# Patient Record
Sex: FEMALE | Race: BLACK OR AFRICAN AMERICAN | Employment: FULL TIME | URBAN - METROPOLITAN AREA
[De-identification: names, ages, dates, MRNs, and addresses within clinical notes are randomized per-mention and may not be internally consistent; named-entity substitution may affect disease eponyms.]

---

## 2022-12-25 ENCOUNTER — HOSPITAL ENCOUNTER (EMERGENCY)
Facility: HOSPITAL | Age: 22
Discharge: HOME | End: 2022-12-25
Attending: EMERGENCY MEDICINE
Payer: MEDICAID

## 2022-12-25 VITALS
HEIGHT: 62 IN | SYSTOLIC BLOOD PRESSURE: 105 MMHG | WEIGHT: 180 LBS | TEMPERATURE: 98 F | BODY MASS INDEX: 33.13 KG/M2 | DIASTOLIC BLOOD PRESSURE: 74 MMHG | HEART RATE: 96 BPM | RESPIRATION RATE: 19 BRPM | OXYGEN SATURATION: 96 %

## 2022-12-25 DIAGNOSIS — Z20.822 ENCOUNTER FOR LABORATORY TESTING FOR COVID-19 VIRUS: Primary | ICD-10-CM

## 2022-12-25 DIAGNOSIS — R09.81 SINUS CONGESTION: ICD-10-CM

## 2022-12-25 LAB
FLUAV + FLUBV RNA SPEC NAA+PROBE: NEGATIVE
FLUAV + FLUBV RNA SPEC NAA+PROBE: NEGATIVE
RSV RNA SPEC NAA+PROBE: NEGATIVE
SARS-COV-2 RNA RESP QL NAA+PROBE: DETECTED

## 2022-12-25 PROCEDURE — 99283 EMERGENCY DEPT VISIT LOW MDM: CPT

## 2022-12-25 PROCEDURE — 0241U SARS-COV-2/FLU A AND B/RSV BY PCR (GENEXPERT): CPT | Performed by: EMERGENCY MEDICINE

## 2022-12-25 RX ORDER — PSEUDOEPHEDRINE HCL 120 MG/1
120 TABLET, FILM COATED, EXTENDED RELEASE ORAL EVERY 12 HOURS PRN
Qty: 10 TABLET | Refills: 0 | Status: SHIPPED | OUTPATIENT
Start: 2022-12-25 | End: 2023-01-24

## 2023-01-08 ENCOUNTER — APPOINTMENT (OUTPATIENT)
Dept: CT IMAGING | Facility: HOSPITAL | Age: 23
End: 2023-01-08
Attending: EMERGENCY MEDICINE
Payer: MEDICAID

## 2023-01-08 ENCOUNTER — HOSPITAL ENCOUNTER (EMERGENCY)
Facility: HOSPITAL | Age: 23
Discharge: HOME | End: 2023-01-08
Attending: EMERGENCY MEDICINE
Payer: MEDICAID

## 2023-01-08 VITALS
SYSTOLIC BLOOD PRESSURE: 106 MMHG | HEIGHT: 62 IN | HEART RATE: 57 BPM | WEIGHT: 190 LBS | OXYGEN SATURATION: 99 % | RESPIRATION RATE: 20 BRPM | BODY MASS INDEX: 34.96 KG/M2 | DIASTOLIC BLOOD PRESSURE: 59 MMHG | TEMPERATURE: 98 F

## 2023-01-08 DIAGNOSIS — R10.9 ABDOMINAL PAIN, ACUTE: Primary | ICD-10-CM

## 2023-01-08 LAB
ALBUMIN SERPL-MCNC: 3.7 G/DL (ref 3.4–5)
ALBUMIN/GLOB SERPL: 0.9 {RATIO} (ref 1–2)
ALP LIVER SERPL-CCNC: 93 U/L
ALT SERPL-CCNC: 9 U/L
ANION GAP SERPL CALC-SCNC: 4 MMOL/L (ref 0–18)
AST SERPL-CCNC: 8 U/L (ref 15–37)
B-HCG UR QL: NEGATIVE
BASOPHILS # BLD AUTO: 0.02 X10(3) UL (ref 0–0.2)
BASOPHILS NFR BLD AUTO: 0.4 %
BILIRUB SERPL-MCNC: 1 MG/DL (ref 0.1–2)
BILIRUB UR QL: NEGATIVE
BUN BLD-MCNC: 6 MG/DL (ref 7–18)
BUN/CREAT SERPL: 10.5 (ref 10–20)
CALCIUM BLD-MCNC: 9 MG/DL (ref 8.5–10.1)
CHLORIDE SERPL-SCNC: 107 MMOL/L (ref 98–112)
CLARITY UR: CLEAR
CO2 SERPL-SCNC: 29 MMOL/L (ref 21–32)
COLOR UR: YELLOW
CREAT BLD-MCNC: 0.57 MG/DL
DEPRECATED RDW RBC AUTO: 43.8 FL (ref 35.1–46.3)
EOSINOPHIL # BLD AUTO: 0.05 X10(3) UL (ref 0–0.7)
EOSINOPHIL NFR BLD AUTO: 1 %
ERYTHROCYTE [DISTWIDTH] IN BLOOD BY AUTOMATED COUNT: 14.5 % (ref 11–15)
GFR SERPLBLD BASED ON 1.73 SQ M-ARVRAT: 132 ML/MIN/1.73M2 (ref 60–?)
GLOBULIN PLAS-MCNC: 3.9 G/DL (ref 2.8–4.4)
GLUCOSE BLD-MCNC: 95 MG/DL (ref 70–99)
GLUCOSE UR-MCNC: NEGATIVE MG/DL
HCT VFR BLD AUTO: 34.8 %
HGB BLD-MCNC: 11.2 G/DL
HGB UR QL STRIP.AUTO: NEGATIVE
IMM GRANULOCYTES # BLD AUTO: 0.01 X10(3) UL (ref 0–1)
IMM GRANULOCYTES NFR BLD: 0.2 %
KETONES UR-MCNC: NEGATIVE MG/DL
LYMPHOCYTES # BLD AUTO: 2.23 X10(3) UL (ref 1–4)
LYMPHOCYTES NFR BLD AUTO: 42.4 %
MCH RBC QN AUTO: 26.8 PG (ref 26–34)
MCHC RBC AUTO-ENTMCNC: 32.2 G/DL (ref 31–37)
MCV RBC AUTO: 83.3 FL
MONOCYTES # BLD AUTO: 0.28 X10(3) UL (ref 0.1–1)
MONOCYTES NFR BLD AUTO: 5.3 %
NEUTROPHILS # BLD AUTO: 2.67 X10 (3) UL (ref 1.5–7.7)
NEUTROPHILS # BLD AUTO: 2.67 X10(3) UL (ref 1.5–7.7)
NEUTROPHILS NFR BLD AUTO: 50.7 %
NITRITE UR QL STRIP.AUTO: NEGATIVE
OSMOLALITY SERPL CALC.SUM OF ELEC: 287 MOSM/KG (ref 275–295)
PH UR: 7 [PH] (ref 5–8)
PLATELET # BLD AUTO: 290 10(3)UL (ref 150–450)
POTASSIUM SERPL-SCNC: 3.7 MMOL/L (ref 3.5–5.1)
PROT SERPL-MCNC: 7.6 G/DL (ref 6.4–8.2)
PROT UR-MCNC: NEGATIVE MG/DL
RBC # BLD AUTO: 4.18 X10(6)UL
SODIUM SERPL-SCNC: 140 MMOL/L (ref 136–145)
SP GR UR STRIP: 1.02 (ref 1–1.03)
UROBILINOGEN UR STRIP-ACNC: 0.2
WBC # BLD AUTO: 5.3 X10(3) UL (ref 4–11)

## 2023-01-08 PROCEDURE — 87086 URINE CULTURE/COLONY COUNT: CPT | Performed by: EMERGENCY MEDICINE

## 2023-01-08 PROCEDURE — 81015 MICROSCOPIC EXAM OF URINE: CPT | Performed by: EMERGENCY MEDICINE

## 2023-01-08 PROCEDURE — 99284 EMERGENCY DEPT VISIT MOD MDM: CPT

## 2023-01-08 PROCEDURE — 80053 COMPREHEN METABOLIC PANEL: CPT | Performed by: EMERGENCY MEDICINE

## 2023-01-08 PROCEDURE — 36415 COLL VENOUS BLD VENIPUNCTURE: CPT

## 2023-01-08 PROCEDURE — 85025 COMPLETE CBC W/AUTO DIFF WBC: CPT | Performed by: EMERGENCY MEDICINE

## 2023-01-08 PROCEDURE — 81001 URINALYSIS AUTO W/SCOPE: CPT | Performed by: EMERGENCY MEDICINE

## 2023-01-08 PROCEDURE — 81025 URINE PREGNANCY TEST: CPT

## 2023-01-08 PROCEDURE — 74176 CT ABD & PELVIS W/O CONTRAST: CPT | Performed by: EMERGENCY MEDICINE

## 2023-01-08 NOTE — ED INITIAL ASSESSMENT (HPI)
llq abdominal pain with radiation into left groin. Pt also endorsing recurrent panic attacks - pt states initially was triggered by \"anything going wrong\", now pt states it comes on randomly without exacerbating factors. Pt denies hi/si or difficulty with adls. Switched from lexapro to zoloft around 6 months ago.     Hx of uti

## 2023-01-08 NOTE — DISCHARGE INSTRUCTIONS
Take Tylenol or ibuprofen as needed for pain. Push fluids, get rest.    Take 1-2 caps of MiraLAX once a day until you have a soft, regular bowel movement. See primary care if not improving the next 5 days. Return to the ER if you develop worsening symptoms, inability to tolerate fluids, difficulty breathing, or any emergent concerns.

## 2023-02-07 ENCOUNTER — HOSPITAL ENCOUNTER (OUTPATIENT)
Age: 23
Discharge: HOME | End: 2023-02-07
Attending: EMERGENCY MEDICINE
Payer: COMMERCIAL

## 2023-02-07 VITALS
DIASTOLIC BLOOD PRESSURE: 58 MMHG | OXYGEN SATURATION: 99 % | SYSTOLIC BLOOD PRESSURE: 110 MMHG | TEMPERATURE: 98 F | HEART RATE: 91 BPM | RESPIRATION RATE: 18 BRPM

## 2023-02-07 DIAGNOSIS — M54.40 BACK PAIN OF LUMBAR REGION WITH SCIATICA: Primary | ICD-10-CM

## 2023-02-07 PROCEDURE — 99213 OFFICE O/P EST LOW 20 MIN: CPT

## 2023-02-07 PROCEDURE — 99214 OFFICE O/P EST MOD 30 MIN: CPT

## 2023-02-07 RX ORDER — METHYLPREDNISOLONE 4 MG/1
TABLET ORAL
Qty: 1 EACH | Refills: 0 | Status: SHIPPED | OUTPATIENT
Start: 2023-02-07

## 2023-02-07 NOTE — DISCHARGE INSTRUCTIONS
Take the steroids as prescribed. Take Tylenol as needed for pain. Use ice or heat to help with pain. See primary care in 1 to 2 weeks.     Go to the ER if you develop worsening symptoms, fever, weakness, or any emergent concerns

## 2023-02-07 NOTE — ED INITIAL ASSESSMENT (HPI)
PATIENT ARRIVED AMBULATORY TO ROOM C/O RIGHT LOWER BACK PAIN RADIATING DOWN THE RIGHT LEG. PAIN STARTED 2 DAYS AGO. NO ACUTE INJURY. WORSENS WITH MOVEMENT. NO URINARY/VAGINAL COMPLAINTS.

## 2023-02-14 ENCOUNTER — OFFICE VISIT (OUTPATIENT)
Dept: OBGYN CLINIC | Facility: CLINIC | Age: 23
End: 2023-02-14

## 2023-02-14 VITALS
HEART RATE: 99 BPM | HEIGHT: 62 IN | SYSTOLIC BLOOD PRESSURE: 108 MMHG | DIASTOLIC BLOOD PRESSURE: 71 MMHG | WEIGHT: 186 LBS | BODY MASS INDEX: 34.23 KG/M2

## 2023-02-14 DIAGNOSIS — Z32.00 PREGNANCY EXAMINATION OR TEST, PREGNANCY UNCONFIRMED: ICD-10-CM

## 2023-02-14 DIAGNOSIS — Z23 NEED FOR HPV VACCINATION: ICD-10-CM

## 2023-02-14 DIAGNOSIS — N96 HISTORY OF RECURRENT MISCARRIAGES: ICD-10-CM

## 2023-02-14 DIAGNOSIS — N94.10 DYSPAREUNIA IN FEMALE: Primary | ICD-10-CM

## 2023-02-14 DIAGNOSIS — Z11.3 SCREEN FOR STD (SEXUALLY TRANSMITTED DISEASE): ICD-10-CM

## 2023-02-14 DIAGNOSIS — Z30.09 BIRTH CONTROL COUNSELING: ICD-10-CM

## 2023-02-14 LAB
CONTROL LINE PRESENT WITH A CLEAR BACKGROUND (YES/NO): YES YES/NO
PREGNANCY TEST, URINE: NEGATIVE

## 2023-02-14 PROCEDURE — 3074F SYST BP LT 130 MM HG: CPT | Performed by: ADVANCED PRACTICE MIDWIFE

## 2023-02-14 PROCEDURE — 3078F DIAST BP <80 MM HG: CPT | Performed by: ADVANCED PRACTICE MIDWIFE

## 2023-02-14 PROCEDURE — 3008F BODY MASS INDEX DOCD: CPT | Performed by: ADVANCED PRACTICE MIDWIFE

## 2023-02-14 PROCEDURE — 90471 IMMUNIZATION ADMIN: CPT | Performed by: ADVANCED PRACTICE MIDWIFE

## 2023-02-14 PROCEDURE — 90651 9VHPV VACCINE 2/3 DOSE IM: CPT | Performed by: ADVANCED PRACTICE MIDWIFE

## 2023-02-14 PROCEDURE — 99203 OFFICE O/P NEW LOW 30 MIN: CPT | Performed by: ADVANCED PRACTICE MIDWIFE

## 2023-02-14 PROCEDURE — 81025 URINE PREGNANCY TEST: CPT | Performed by: ADVANCED PRACTICE MIDWIFE

## 2023-02-15 ENCOUNTER — APPOINTMENT (OUTPATIENT)
Dept: GENERAL RADIOLOGY | Age: 23
End: 2023-02-15
Attending: NURSE PRACTITIONER
Payer: COMMERCIAL

## 2023-02-15 ENCOUNTER — HOSPITAL ENCOUNTER (OUTPATIENT)
Age: 23
Discharge: HOME | End: 2023-02-15
Payer: COMMERCIAL

## 2023-02-15 ENCOUNTER — TELEPHONE (OUTPATIENT)
Dept: OBGYN CLINIC | Facility: CLINIC | Age: 23
End: 2023-02-15

## 2023-02-15 VITALS
HEIGHT: 62 IN | HEART RATE: 95 BPM | OXYGEN SATURATION: 99 % | TEMPERATURE: 98 F | BODY MASS INDEX: 34.23 KG/M2 | WEIGHT: 186 LBS | DIASTOLIC BLOOD PRESSURE: 59 MMHG | SYSTOLIC BLOOD PRESSURE: 123 MMHG | RESPIRATION RATE: 16 BRPM

## 2023-02-15 DIAGNOSIS — M25.561 ACUTE PAIN OF RIGHT KNEE: Primary | ICD-10-CM

## 2023-02-15 PROBLEM — Z62.810 HISTORY OF SEXUAL ABUSE IN CHILDHOOD: Status: ACTIVE | Noted: 2023-02-15

## 2023-02-15 PROCEDURE — 73560 X-RAY EXAM OF KNEE 1 OR 2: CPT | Performed by: NURSE PRACTITIONER

## 2023-02-15 RX ORDER — PREDNISONE 20 MG/1
40 TABLET ORAL DAILY
Qty: 10 TABLET | Refills: 0 | Status: SHIPPED | OUTPATIENT
Start: 2023-02-15 | End: 2023-02-20

## 2023-02-15 NOTE — TELEPHONE ENCOUNTER
Can you please send the patient the vulvodynia study. I tried but cannot figure out how to get it to attach.  Thanks MJ

## 2023-02-15 NOTE — DISCHARGE INSTRUCTIONS
Start prednisone once finished with medrol dosepak, if knee pain doesn't improve  You may take Tylenol 1000 mg every 6 hours for pain as well  Taking Zoloft with NSAIDS, combined with your history of GI bleed can be dangerous.  Avoid ibuprofen, aleve, motrin, aspirin   Voltaren gel over the counter and apply to painful areas  You can also try Aspercreme lidocaine patches  If no better in one week, follow up with pcp, a referral was placed for you in this building  If you develop a fever, inability to move your knee, or a red, hot swollen knee, please go to the emergency room

## 2023-02-15 NOTE — ED INITIAL ASSESSMENT (HPI)
Pt presents to the IC with c/o right knee swelling and pain. Pt was seen on 2/7 at [address] [place] [address] for sciatica and prescribed steroids and told to ice/heat the area. Pt notes swelling to the right knee at the same time.

## 2023-02-16 LAB
C TRACH DNA SPEC QL NAA+PROBE: NEGATIVE
N GONORRHOEA DNA SPEC QL NAA+PROBE: NEGATIVE

## 2023-02-17 LAB
GENITAL VAGINOSIS SCREEN: POSITIVE
TRICHOMONAS SCREEN: NEGATIVE

## 2023-02-19 RX ORDER — METRONIDAZOLE 7.5 MG/G
1 GEL VAGINAL NIGHTLY
Qty: 70 G | Refills: 0 | Status: SHIPPED | OUTPATIENT
Start: 2023-02-19 | End: 2023-02-24

## 2023-03-08 PROBLEM — F43.23 ADJUSTMENT DISORDER WITH MIXED ANXIETY AND DEPRESSED MOOD: Status: ACTIVE | Noted: 2019-06-13

## 2023-03-08 PROBLEM — M79.2 RADICULAR PAIN IN LEFT ARM: Status: ACTIVE | Noted: 2020-05-01

## 2023-03-08 PROBLEM — H92.09 OTALGIA: Status: ACTIVE | Noted: 2021-09-30

## 2023-03-08 PROBLEM — D64.9 NORMOCYTIC ANEMIA: Status: ACTIVE | Noted: 2019-10-31

## 2023-03-08 PROBLEM — E66.01 MORBID (SEVERE) OBESITY DUE TO EXCESS CALORIES (HCC): Status: ACTIVE | Noted: 2019-07-11

## 2023-03-08 PROBLEM — R55 VASOVAGAL SYNCOPE: Status: ACTIVE | Noted: 2020-08-27

## 2023-03-08 PROBLEM — M08.80 JUVENILE IDIOPATHIC ARTHRITIS (HCC): Status: ACTIVE | Noted: 2019-07-03

## 2023-03-08 PROBLEM — N30.00 ACUTE CYSTITIS WITHOUT HEMATURIA: Status: ACTIVE | Noted: 2020-02-25

## 2023-03-08 PROBLEM — F43.10 PTSD (POST-TRAUMATIC STRESS DISORDER): Status: ACTIVE | Noted: 2019-10-31

## 2023-03-08 PROBLEM — N92.6 IRREGULAR MENSTRUAL CYCLE: Status: ACTIVE | Noted: 2019-07-11

## 2023-03-08 PROBLEM — M26.69 INFLAMMATION OF TEMPOROMANDIBULAR JOINT: Status: ACTIVE | Noted: 2021-09-30

## 2023-03-08 PROBLEM — E16.1 HYPERINSULINEMIA: Status: ACTIVE | Noted: 2019-07-11

## 2023-03-08 PROBLEM — K92.0 HEMATEMESIS WITHOUT NAUSEA: Status: ACTIVE | Noted: 2019-07-24

## 2023-03-10 ENCOUNTER — OFFICE VISIT (OUTPATIENT)
Dept: RHEUMATOLOGY | Facility: CLINIC | Age: 23
End: 2023-03-10

## 2023-03-10 VITALS
BODY MASS INDEX: 34.6 KG/M2 | HEART RATE: 94 BPM | SYSTOLIC BLOOD PRESSURE: 110 MMHG | HEIGHT: 62 IN | DIASTOLIC BLOOD PRESSURE: 63 MMHG | WEIGHT: 188 LBS

## 2023-03-10 DIAGNOSIS — M25.561 CHRONIC PAIN OF RIGHT KNEE: ICD-10-CM

## 2023-03-10 DIAGNOSIS — M25.50 POLYARTHRALGIA: Primary | ICD-10-CM

## 2023-03-10 DIAGNOSIS — G89.29 CHRONIC PAIN OF RIGHT KNEE: ICD-10-CM

## 2023-03-10 PROCEDURE — 3074F SYST BP LT 130 MM HG: CPT | Performed by: INTERNAL MEDICINE

## 2023-03-10 PROCEDURE — 3008F BODY MASS INDEX DOCD: CPT | Performed by: INTERNAL MEDICINE

## 2023-03-10 PROCEDURE — 3078F DIAST BP <80 MM HG: CPT | Performed by: INTERNAL MEDICINE

## 2023-03-10 PROCEDURE — 99204 OFFICE O/P NEW MOD 45 MIN: CPT | Performed by: INTERNAL MEDICINE

## 2023-03-10 NOTE — PATIENT INSTRUCTIONS
You were seen today for some joint pain in your right knee  Also a possible history of lupus and fibromyalgia but I think less likely  Lets get some blood work today  I will send you to physical therapy  If you have swelling the right knee again I would like to see you as I would like to drain it  May consider MRI of the right knee

## 2023-03-14 ENCOUNTER — OFFICE VISIT (OUTPATIENT)
Dept: PODIATRY CLINIC | Facility: CLINIC | Age: 23
End: 2023-03-14

## 2023-03-14 DIAGNOSIS — L84 CALLUS OF FOOT: ICD-10-CM

## 2023-03-14 DIAGNOSIS — L60.8 INCURVATED NAIL: ICD-10-CM

## 2023-03-14 DIAGNOSIS — M21.41 PES PLANUS OF BOTH FEET: Primary | ICD-10-CM

## 2023-03-14 DIAGNOSIS — M21.42 PES PLANUS OF BOTH FEET: Primary | ICD-10-CM

## 2023-03-14 PROCEDURE — 99203 OFFICE O/P NEW LOW 30 MIN: CPT | Performed by: STUDENT IN AN ORGANIZED HEALTH CARE EDUCATION/TRAINING PROGRAM

## 2023-03-24 ENCOUNTER — APPOINTMENT (OUTPATIENT)
Dept: ULTRASOUND IMAGING | Facility: HOSPITAL | Age: 23
End: 2023-03-24
Attending: STUDENT IN AN ORGANIZED HEALTH CARE EDUCATION/TRAINING PROGRAM
Payer: COMMERCIAL

## 2023-03-24 ENCOUNTER — HOSPITAL ENCOUNTER (EMERGENCY)
Facility: HOSPITAL | Age: 23
Discharge: HOME | End: 2023-03-24
Attending: STUDENT IN AN ORGANIZED HEALTH CARE EDUCATION/TRAINING PROGRAM
Payer: COMMERCIAL

## 2023-03-24 VITALS
HEART RATE: 84 BPM | OXYGEN SATURATION: 99 % | BODY MASS INDEX: 34.04 KG/M2 | HEIGHT: 62 IN | SYSTOLIC BLOOD PRESSURE: 118 MMHG | TEMPERATURE: 98 F | RESPIRATION RATE: 18 BRPM | WEIGHT: 185 LBS | DIASTOLIC BLOOD PRESSURE: 60 MMHG

## 2023-03-24 DIAGNOSIS — N93.9 VAGINAL BLEEDING: Primary | ICD-10-CM

## 2023-03-24 LAB
ANION GAP SERPL CALC-SCNC: 4 MMOL/L (ref 0–18)
B-HCG UR QL: NEGATIVE
BASOPHILS # BLD AUTO: 0.03 X10(3) UL (ref 0–0.2)
BASOPHILS NFR BLD AUTO: 0.7 %
BILIRUB UR QL: NEGATIVE
BUN BLD-MCNC: 6 MG/DL (ref 7–18)
BUN/CREAT SERPL: 9.5 (ref 10–20)
CALCIUM BLD-MCNC: 9 MG/DL (ref 8.5–10.1)
CHLORIDE SERPL-SCNC: 110 MMOL/L (ref 98–112)
CO2 SERPL-SCNC: 28 MMOL/L (ref 21–32)
CREAT BLD-MCNC: 0.63 MG/DL
DEPRECATED RDW RBC AUTO: 45.4 FL (ref 35.1–46.3)
EOSINOPHIL # BLD AUTO: 0.04 X10(3) UL (ref 0–0.7)
EOSINOPHIL NFR BLD AUTO: 0.9 %
ERYTHROCYTE [DISTWIDTH] IN BLOOD BY AUTOMATED COUNT: 14.8 % (ref 11–15)
GFR SERPLBLD BASED ON 1.73 SQ M-ARVRAT: 129 ML/MIN/1.73M2 (ref 60–?)
GLUCOSE BLD-MCNC: 96 MG/DL (ref 70–99)
GLUCOSE UR-MCNC: NORMAL MG/DL
HCT VFR BLD AUTO: 33.9 %
HGB BLD-MCNC: 11.4 G/DL
IMM GRANULOCYTES # BLD AUTO: 0 X10(3) UL (ref 0–1)
IMM GRANULOCYTES NFR BLD: 0 %
KETONES UR-MCNC: NEGATIVE MG/DL
LEUKOCYTE ESTERASE UR QL STRIP.AUTO: 75
LYMPHOCYTES # BLD AUTO: 1.69 X10(3) UL (ref 1–4)
LYMPHOCYTES NFR BLD AUTO: 38.9 %
MCH RBC QN AUTO: 28.1 PG (ref 26–34)
MCHC RBC AUTO-ENTMCNC: 33.6 G/DL (ref 31–37)
MCV RBC AUTO: 83.7 FL
MONOCYTES # BLD AUTO: 0.24 X10(3) UL (ref 0.1–1)
MONOCYTES NFR BLD AUTO: 5.5 %
NEUTROPHILS # BLD AUTO: 2.34 X10 (3) UL (ref 1.5–7.7)
NEUTROPHILS # BLD AUTO: 2.34 X10(3) UL (ref 1.5–7.7)
NEUTROPHILS NFR BLD AUTO: 54 %
NITRITE UR QL STRIP.AUTO: NEGATIVE
OSMOLALITY SERPL CALC.SUM OF ELEC: 291 MOSM/KG (ref 275–295)
PH UR: 7 [PH] (ref 5–8)
PLATELET # BLD AUTO: 238 10(3)UL (ref 150–450)
POTASSIUM SERPL-SCNC: 3.6 MMOL/L (ref 3.5–5.1)
PROT UR-MCNC: 30 MG/DL
RBC # BLD AUTO: 4.05 X10(6)UL
RBC #/AREA URNS AUTO: >10 /HPF
SODIUM SERPL-SCNC: 142 MMOL/L (ref 136–145)
SP GR UR STRIP: 1.01 (ref 1–1.03)
UROBILINOGEN UR STRIP-ACNC: NORMAL
WBC # BLD AUTO: 4.3 X10(3) UL (ref 4–11)

## 2023-03-24 PROCEDURE — 76856 US EXAM PELVIC COMPLETE: CPT | Performed by: STUDENT IN AN ORGANIZED HEALTH CARE EDUCATION/TRAINING PROGRAM

## 2023-03-24 PROCEDURE — 99284 EMERGENCY DEPT VISIT MOD MDM: CPT

## 2023-03-24 PROCEDURE — 93975 VASCULAR STUDY: CPT | Performed by: STUDENT IN AN ORGANIZED HEALTH CARE EDUCATION/TRAINING PROGRAM

## 2023-03-24 PROCEDURE — 80048 BASIC METABOLIC PNL TOTAL CA: CPT | Performed by: STUDENT IN AN ORGANIZED HEALTH CARE EDUCATION/TRAINING PROGRAM

## 2023-03-24 PROCEDURE — 36415 COLL VENOUS BLD VENIPUNCTURE: CPT

## 2023-03-24 PROCEDURE — 81025 URINE PREGNANCY TEST: CPT

## 2023-03-24 PROCEDURE — 81001 URINALYSIS AUTO W/SCOPE: CPT | Performed by: STUDENT IN AN ORGANIZED HEALTH CARE EDUCATION/TRAINING PROGRAM

## 2023-03-24 PROCEDURE — 76830 TRANSVAGINAL US NON-OB: CPT | Performed by: STUDENT IN AN ORGANIZED HEALTH CARE EDUCATION/TRAINING PROGRAM

## 2023-03-24 PROCEDURE — 85025 COMPLETE CBC W/AUTO DIFF WBC: CPT | Performed by: STUDENT IN AN ORGANIZED HEALTH CARE EDUCATION/TRAINING PROGRAM

## 2023-03-24 PROCEDURE — 87086 URINE CULTURE/COLONY COUNT: CPT | Performed by: STUDENT IN AN ORGANIZED HEALTH CARE EDUCATION/TRAINING PROGRAM

## 2023-03-24 NOTE — ED INITIAL ASSESSMENT (HPI)
Pt ambulatory to ED via private vehicle, states she's having her first period since a miscarriage and has saturated through 3 large pads since 4am. + sharp pelvic pain. +lightheaded.

## 2023-03-29 ENCOUNTER — LAB ENCOUNTER (OUTPATIENT)
Dept: LAB | Facility: HOSPITAL | Age: 23
End: 2023-03-29
Attending: ADVANCED PRACTICE MIDWIFE
Payer: COMMERCIAL

## 2023-03-29 ENCOUNTER — OFFICE VISIT (OUTPATIENT)
Dept: OBGYN CLINIC | Facility: CLINIC | Age: 23
End: 2023-03-29

## 2023-03-29 VITALS
WEIGHT: 190 LBS | DIASTOLIC BLOOD PRESSURE: 74 MMHG | SYSTOLIC BLOOD PRESSURE: 131 MMHG | BODY MASS INDEX: 35 KG/M2 | HEART RATE: 86 BPM

## 2023-03-29 DIAGNOSIS — Z32.00 PREGNANCY EXAMINATION OR TEST, PREGNANCY UNCONFIRMED: ICD-10-CM

## 2023-03-29 DIAGNOSIS — N92.0 MENORRHAGIA WITH REGULAR CYCLE: Primary | ICD-10-CM

## 2023-03-29 DIAGNOSIS — N92.0 MENORRHAGIA WITH REGULAR CYCLE: ICD-10-CM

## 2023-03-29 LAB
CONTROL LINE PRESENT WITH A CLEAR BACKGROUND (YES/NO): YES YES/NO
DEPRECATED RDW RBC AUTO: 48 FL (ref 35.1–46.3)
ERYTHROCYTE [DISTWIDTH] IN BLOOD BY AUTOMATED COUNT: 15.1 % (ref 11–15)
HCT VFR BLD AUTO: 37.4 %
HGB BLD-MCNC: 11.8 G/DL
MCH RBC QN AUTO: 27.1 PG (ref 26–34)
MCHC RBC AUTO-ENTMCNC: 31.6 G/DL (ref 31–37)
MCV RBC AUTO: 86 FL
PLATELET # BLD AUTO: 236 10(3)UL (ref 150–450)
PREGNANCY TEST, URINE: NEGATIVE
RBC # BLD AUTO: 4.35 X10(6)UL
T4 FREE SERPL-MCNC: 1 NG/DL (ref 0.8–1.7)
TSI SER-ACNC: 0.68 MIU/ML (ref 0.36–3.74)
WBC # BLD AUTO: 5.1 X10(3) UL (ref 4–11)

## 2023-03-29 PROCEDURE — 85027 COMPLETE CBC AUTOMATED: CPT

## 2023-03-29 PROCEDURE — 3075F SYST BP GE 130 - 139MM HG: CPT | Performed by: ADVANCED PRACTICE MIDWIFE

## 2023-03-29 PROCEDURE — 36415 COLL VENOUS BLD VENIPUNCTURE: CPT

## 2023-03-29 PROCEDURE — 3078F DIAST BP <80 MM HG: CPT | Performed by: ADVANCED PRACTICE MIDWIFE

## 2023-03-29 PROCEDURE — 84443 ASSAY THYROID STIM HORMONE: CPT

## 2023-03-29 PROCEDURE — 99214 OFFICE O/P EST MOD 30 MIN: CPT | Performed by: ADVANCED PRACTICE MIDWIFE

## 2023-03-29 PROCEDURE — 84439 ASSAY OF FREE THYROXINE: CPT

## 2023-03-29 PROCEDURE — 81025 URINE PREGNANCY TEST: CPT | Performed by: ADVANCED PRACTICE MIDWIFE

## 2023-03-29 RX ORDER — TRANEXAMIC ACID 650 MG/1
1300 TABLET ORAL 3 TIMES DAILY
Qty: 30 TABLET | Refills: 0 | Status: SHIPPED | OUTPATIENT
Start: 2023-03-29 | End: 2023-04-03

## 2023-04-17 ENCOUNTER — TELEPHONE (OUTPATIENT)
Dept: OBGYN CLINIC | Facility: CLINIC | Age: 23
End: 2023-04-17

## 2023-04-17 NOTE — TELEPHONE ENCOUNTER
Pt is calling said she arrived for appt  On 4/5 and tracy foley joined appt and now she is being charged said she took picture of the weighting room and message she received while waiting this was a video appt

## 2023-04-25 ENCOUNTER — TELEPHONE (OUTPATIENT)
Facility: CLINIC | Age: 23
End: 2023-04-25

## 2023-04-25 ENCOUNTER — LAB ENCOUNTER (OUTPATIENT)
Dept: LAB | Age: 23
End: 2023-04-25
Attending: INTERNAL MEDICINE
Payer: COMMERCIAL

## 2023-04-25 ENCOUNTER — OFFICE VISIT (OUTPATIENT)
Dept: GASTROENTEROLOGY | Facility: CLINIC | Age: 23
End: 2023-04-25

## 2023-04-25 VITALS
HEART RATE: 80 BPM | HEIGHT: 62 IN | DIASTOLIC BLOOD PRESSURE: 65 MMHG | WEIGHT: 190 LBS | SYSTOLIC BLOOD PRESSURE: 108 MMHG | BODY MASS INDEX: 34.96 KG/M2

## 2023-04-25 DIAGNOSIS — K21.9 GASTROESOPHAGEAL REFLUX DISEASE, UNSPECIFIED WHETHER ESOPHAGITIS PRESENT: ICD-10-CM

## 2023-04-25 DIAGNOSIS — K92.0 HEMATEMESIS WITH NAUSEA: ICD-10-CM

## 2023-04-25 DIAGNOSIS — R10.13 DYSPEPSIA: ICD-10-CM

## 2023-04-25 DIAGNOSIS — K21.9 GASTROESOPHAGEAL REFLUX DISEASE WITHOUT ESOPHAGITIS: ICD-10-CM

## 2023-04-25 DIAGNOSIS — K92.0 HEMATEMESIS WITH NAUSEA: Primary | ICD-10-CM

## 2023-04-25 DIAGNOSIS — R10.13 EPIGASTRIC PAIN: ICD-10-CM

## 2023-04-25 DIAGNOSIS — K59.02 CONSTIPATION DUE TO OUTLET DYSFUNCTION: ICD-10-CM

## 2023-04-25 DIAGNOSIS — R11.2 NAUSEA AND VOMITING, UNSPECIFIED VOMITING TYPE: ICD-10-CM

## 2023-04-25 DIAGNOSIS — K62.5 RECTAL BLEEDING: ICD-10-CM

## 2023-04-25 PROCEDURE — 83013 H PYLORI (C-13) BREATH: CPT

## 2023-04-25 PROCEDURE — 3008F BODY MASS INDEX DOCD: CPT | Performed by: INTERNAL MEDICINE

## 2023-04-25 PROCEDURE — 3074F SYST BP LT 130 MM HG: CPT | Performed by: INTERNAL MEDICINE

## 2023-04-25 PROCEDURE — 3078F DIAST BP <80 MM HG: CPT | Performed by: INTERNAL MEDICINE

## 2023-04-25 PROCEDURE — 99204 OFFICE O/P NEW MOD 45 MIN: CPT | Performed by: INTERNAL MEDICINE

## 2023-04-25 RX ORDER — OMEPRAZOLE 40 MG/1
40 CAPSULE, DELAYED RELEASE ORAL DAILY
Qty: 90 CAPSULE | Refills: 3 | Status: SHIPPED | OUTPATIENT
Start: 2023-04-25 | End: 2024-04-19

## 2023-04-25 NOTE — TELEPHONE ENCOUNTER
Scheduled for: Colonoscopy [id number]/EGD [address]    Provider Name: Dr Conner Oro   Date: Mon 7/10/2023   Location: [facility]   Sedation: MAC   Time: 1:15 pm, (pt is aware that [facility] will call the day before to confirm arrival time)  Prep: split golytely   Meds/Allergies Reconciled?: reviewed by provider    Diagnosis with codes: nausea/vomiting R11.2, Dyspepsia R10.13, rectal bleeding K62.5, hematemesis K92.0, gerd K21.9  Was patient informed to call insurance with codes (Y/N): Yes    Referral sent?: Yes   [facility] or [address]  notified?: Electronic case request was sent to [facility] via [employer]. Medication Orders: Pt is aware to NOT take iron pills, herbal meds and diet supplements for 7 days before exam. Also to NOT take any form of alcohol, recreational drugs and any forms of ED meds 24 hours before exam.      Misc Orders:       Further instructions given by staff:  Instructions given in office and pt verbalized understanding

## 2023-04-25 NOTE — PATIENT INSTRUCTIONS
PLAN    - Please have the H. Pylori testing done   - If the breath test is not available, please submit the stool test   - Once completed, please start omeprazole 40 mg daily 30 min before breakfast      - Please do the washout for your constipation, if needed when able - see below for the directions   - After the washout is complete, start miralax 1 capful in the morning and milk of magnesia (if needed) at night   - Also try colace and senna, which are over the counter laxatives. Do not take senna more than once every couple of weeks. - Follow up with Dr. Cuco Patel after the procedures and PT        WASHOUT INSTRUCTIONS:  1. Pick a day you will be at home, close to a toilet. 2. Have a some juice for breakfast.   3. Around 10AM start drinking the solution prescribed (for trilyte, drink 1 cup every 15 minutes) over the course of 3-5 hours. IF having nausea/bloating, take a break for 30 minutes, walk around and then resume drinking. If vomiting, take a break for 1 hour, if symptoms improve, and you feel well, can resume drinking. 4. Goal is to finish solution or until stools turn liquid yellow. 5. For lunch you should have a liquid diet (7up, water, gingerale, etc)  6. After prep, for dinner you can have a light dinner. 7. Resume regular meals the following day, along with laxative medications. 8. You should continue your regular medications during the washout day. Instructions for the procedures:  1. Schedule EGD and colonoscopy with anesthesia [Diagnosis: nausea, vomiting, hematemesis, dyspepsia, rectal bleeding]    2.  bowel prep from pharmacy (split dose golytely)    3. Continue all medications for procedure    4. Read all bowel prep instructions carefully    5. AVOID seeds, nuts, popcorn, raw fruits and vegetables (cooked is okay) for 2-3 days before procedure    6. You will need to go for COVID testing 72 hours before procedure.  The testing team will call you a few days before your procedure to notify you where/when you can get COVID testing. If you do not go for COVID testing, the procedure cannot be performed. 7. If you start any NEW medication after your visit today, please notify us. Certain medications will need to be held before the procedure, or the procedure cannot be performed.

## 2023-04-27 LAB — H PYLORI BREATH TEST: NEGATIVE

## 2023-04-28 ENCOUNTER — APPOINTMENT (OUTPATIENT)
Dept: GENERAL RADIOLOGY | Age: 23
End: 2023-04-28
Attending: NURSE PRACTITIONER
Payer: COMMERCIAL

## 2023-04-28 ENCOUNTER — HOSPITAL ENCOUNTER (OUTPATIENT)
Age: 23
Discharge: HOME | End: 2023-04-28
Payer: COMMERCIAL

## 2023-04-28 VITALS
DIASTOLIC BLOOD PRESSURE: 85 MMHG | HEART RATE: 94 BPM | TEMPERATURE: 98 F | OXYGEN SATURATION: 100 % | SYSTOLIC BLOOD PRESSURE: 105 MMHG | RESPIRATION RATE: 18 BRPM

## 2023-04-28 DIAGNOSIS — G56.01 CARPAL TUNNEL SYNDROME OF RIGHT WRIST: Primary | ICD-10-CM

## 2023-04-28 PROCEDURE — L3908 WHO COCK-UP NONMOLDE PRE OTS: HCPCS | Performed by: NURSE PRACTITIONER

## 2023-04-28 PROCEDURE — 73130 X-RAY EXAM OF HAND: CPT | Performed by: NURSE PRACTITIONER

## 2023-04-28 PROCEDURE — 73110 X-RAY EXAM OF WRIST: CPT | Performed by: NURSE PRACTITIONER

## 2023-04-28 PROCEDURE — 99213 OFFICE O/P EST LOW 20 MIN: CPT | Performed by: NURSE PRACTITIONER
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
## 2023-04-28 NOTE — DISCHARGE INSTRUCTIONS
Rest from exacerbating activities for the next week. Wear the wrist splint as much as possible unless bathing. Apply ice/cold compress for 20min at a time 4-6x daily for the next 2-3 days. Keep the right hand elevated when resting as much as possible. You may take Motrin every 6 hours as needed for pain. Take this with food. If additional pain control is needed, you may also take Tylenol every 6 hours. Follow up with your primary provider or the hand specialist provided in your discharge instructions in the next 2-3 days. Seek additional care in the ER for new or worsening symptoms, fever or increasing pain.

## 2023-05-06 ENCOUNTER — HOSPITAL ENCOUNTER (EMERGENCY)
Facility: HOSPITAL | Age: 23
Discharge: HOME | End: 2023-05-06
Attending: EMERGENCY MEDICINE
Payer: COMMERCIAL

## 2023-05-06 ENCOUNTER — APPOINTMENT (OUTPATIENT)
Dept: GENERAL RADIOLOGY | Facility: HOSPITAL | Age: 23
End: 2023-05-06
Attending: EMERGENCY MEDICINE
Payer: COMMERCIAL

## 2023-05-06 VITALS
HEART RATE: 93 BPM | WEIGHT: 189 LBS | HEIGHT: 62 IN | BODY MASS INDEX: 34.78 KG/M2 | TEMPERATURE: 98 F | OXYGEN SATURATION: 95 % | DIASTOLIC BLOOD PRESSURE: 69 MMHG | RESPIRATION RATE: 17 BRPM | SYSTOLIC BLOOD PRESSURE: 101 MMHG

## 2023-05-06 DIAGNOSIS — R50.9 ACUTE FEBRILE ILLNESS: Primary | ICD-10-CM

## 2023-05-06 LAB
ATRIAL RATE: 102 BPM
BILIRUB UR QL: NEGATIVE
GLUCOSE UR-MCNC: NORMAL MG/DL
HGB UR QL STRIP.AUTO: NEGATIVE
KETONES UR-MCNC: NEGATIVE MG/DL
LEUKOCYTE ESTERASE UR QL STRIP.AUTO: NEGATIVE
NITRITE UR QL STRIP.AUTO: NEGATIVE
P AXIS: 72 DEGREES
P-R INTERVAL: 136 MS
PH UR: 6 [PH] (ref 5–8)
Q-T INTERVAL: 312 MS
QRS DURATION: 70 MS
QTC CALCULATION (BEZET): 406 MS
R AXIS: 26 DEGREES
SARS-COV-2 RNA RESP QL NAA+PROBE: NOT DETECTED
SP GR UR STRIP: 1.01 (ref 1–1.03)
T AXIS: 38 DEGREES
UROBILINOGEN UR STRIP-ACNC: NORMAL
VENTRICULAR RATE: 102 BPM

## 2023-05-06 PROCEDURE — 93010 ELECTROCARDIOGRAM REPORT: CPT

## 2023-05-06 PROCEDURE — 99284 EMERGENCY DEPT VISIT MOD MDM: CPT

## 2023-05-06 PROCEDURE — 93005 ELECTROCARDIOGRAM TRACING: CPT

## 2023-05-06 PROCEDURE — 81001 URINALYSIS AUTO W/SCOPE: CPT | Performed by: EMERGENCY MEDICINE

## 2023-05-06 PROCEDURE — 99285 EMERGENCY DEPT VISIT HI MDM: CPT

## 2023-05-06 PROCEDURE — 71045 X-RAY EXAM CHEST 1 VIEW: CPT | Performed by: EMERGENCY MEDICINE

## 2023-05-06 RX ORDER — ACETAMINOPHEN 500 MG
1000 TABLET ORAL ONCE
Status: COMPLETED | OUTPATIENT
Start: 2023-05-06 | End: 2023-05-06

## 2023-05-06 NOTE — ED INITIAL ASSESSMENT (HPI)
Pt ambulatory to ED A&O x 4 w/ c/o: Shortness of breath since yesterday. Pt also reporting mid-sternal chest pain, states, \"it feels like someone is sitting on my chest.\"  (+) Fever at home. Denies n/v/d, chills, dizziness/lightheadedness.

## 2023-05-06 NOTE — ED QUICK NOTES
PT ambulatory with steady gait to ED. Reports dyspnea at rest x1 day. Denies chest pain, nausea, vomiting, constipation, diarrhea, blood in urine, and blood in stool.

## 2023-05-17 ENCOUNTER — OFFICE VISIT (OUTPATIENT)
Dept: NEUROLOGY | Facility: CLINIC | Age: 23
End: 2023-05-17
Payer: COMMERCIAL

## 2023-05-17 ENCOUNTER — TELEPHONE (OUTPATIENT)
Dept: PHYSICAL THERAPY | Facility: HOSPITAL | Age: 23
End: 2023-05-17

## 2023-05-17 VITALS
BODY MASS INDEX: 34.78 KG/M2 | WEIGHT: 189 LBS | HEIGHT: 62 IN | DIASTOLIC BLOOD PRESSURE: 64 MMHG | SYSTOLIC BLOOD PRESSURE: 106 MMHG | HEART RATE: 68 BPM

## 2023-05-17 DIAGNOSIS — R42 VERTIGO: ICD-10-CM

## 2023-05-17 DIAGNOSIS — F07.81 POST CONCUSSION SYNDROME: ICD-10-CM

## 2023-05-17 DIAGNOSIS — R56.9 SEIZURE-LIKE ACTIVITY (HCC): ICD-10-CM

## 2023-05-17 DIAGNOSIS — R40.20 LOSS OF CONSCIOUSNESS (HCC): ICD-10-CM

## 2023-05-17 DIAGNOSIS — R55 VASOVAGAL SYNCOPE: Primary | ICD-10-CM

## 2023-05-17 PROCEDURE — 3078F DIAST BP <80 MM HG: CPT | Performed by: STUDENT IN AN ORGANIZED HEALTH CARE EDUCATION/TRAINING PROGRAM

## 2023-05-17 PROCEDURE — 99214 OFFICE O/P EST MOD 30 MIN: CPT | Performed by: STUDENT IN AN ORGANIZED HEALTH CARE EDUCATION/TRAINING PROGRAM

## 2023-05-17 PROCEDURE — 3008F BODY MASS INDEX DOCD: CPT | Performed by: STUDENT IN AN ORGANIZED HEALTH CARE EDUCATION/TRAINING PROGRAM

## 2023-05-17 PROCEDURE — 3074F SYST BP LT 130 MM HG: CPT | Performed by: STUDENT IN AN ORGANIZED HEALTH CARE EDUCATION/TRAINING PROGRAM

## 2023-05-23 ENCOUNTER — OFFICE VISIT (OUTPATIENT)
Dept: PHYSICAL THERAPY | Age: 23
End: 2023-05-23
Attending: ADVANCED PRACTICE MIDWIFE
Payer: COMMERCIAL

## 2023-05-23 DIAGNOSIS — N94.10 DYSPAREUNIA IN FEMALE: ICD-10-CM

## 2023-05-23 PROCEDURE — 97162 PT EVAL MOD COMPLEX 30 MIN: CPT

## 2023-05-23 PROCEDURE — 97112 NEUROMUSCULAR REEDUCATION: CPT

## 2023-05-25 ENCOUNTER — HOSPITAL ENCOUNTER (OUTPATIENT)
Age: 23
Discharge: HOME | End: 2023-05-25
Payer: COMMERCIAL

## 2023-05-25 VITALS
DIASTOLIC BLOOD PRESSURE: 54 MMHG | TEMPERATURE: 98 F | RESPIRATION RATE: 16 BRPM | HEART RATE: 91 BPM | SYSTOLIC BLOOD PRESSURE: 95 MMHG | OXYGEN SATURATION: 99 %

## 2023-05-25 DIAGNOSIS — N89.8 VAGINAL LESION: ICD-10-CM

## 2023-05-25 DIAGNOSIS — B37.31 YEAST VAGINITIS: ICD-10-CM

## 2023-05-25 DIAGNOSIS — N89.8 VAGINAL DISCHARGE: Primary | ICD-10-CM

## 2023-05-25 LAB
B-HCG UR QL: NEGATIVE
BILIRUB UR QL STRIP: NEGATIVE
CLARITY UR: CLEAR
COLOR UR: YELLOW
GLUCOSE UR STRIP-MCNC: NEGATIVE MG/DL
HGB UR QL STRIP: NEGATIVE
KETONES UR STRIP-MCNC: NEGATIVE MG/DL
LEUKOCYTE ESTERASE UR QL STRIP: NEGATIVE
NITRITE UR QL STRIP: NEGATIVE
PH UR STRIP: 6 [PH]
PROT UR STRIP-MCNC: NEGATIVE MG/DL
SP GR UR STRIP: >=1.03
UROBILINOGEN UR STRIP-ACNC: <2 MG/DL

## 2023-05-25 PROCEDURE — 99214 OFFICE O/P EST MOD 30 MIN: CPT

## 2023-05-25 PROCEDURE — 87591 N.GONORRHOEAE DNA AMP PROB: CPT

## 2023-05-25 PROCEDURE — 87491 CHLMYD TRACH DNA AMP PROBE: CPT

## 2023-05-25 PROCEDURE — 87529 HSV DNA AMP PROBE: CPT

## 2023-05-25 PROCEDURE — 81002 URINALYSIS NONAUTO W/O SCOPE: CPT

## 2023-05-25 PROCEDURE — 87106 FUNGI IDENTIFICATION YEAST: CPT

## 2023-05-25 PROCEDURE — G0452 MOLECULAR PATHOLOGY INTERPR: HCPCS

## 2023-05-25 PROCEDURE — 81025 URINE PREGNANCY TEST: CPT

## 2023-05-25 PROCEDURE — 87808 TRICHOMONAS ASSAY W/OPTIC: CPT

## 2023-05-25 PROCEDURE — 87205 SMEAR GRAM STAIN: CPT

## 2023-05-25 RX ORDER — FLUCONAZOLE 150 MG/1
TABLET ORAL
Qty: 2 TABLET | Refills: 0 | Status: SHIPPED | OUTPATIENT
Start: 2023-05-25

## 2023-05-25 NOTE — DISCHARGE INSTRUCTIONS
We sent specimens today for bacterial vaginosis, yeast, trichomonas, gonorrhea and chlamydia. I sent a prescription for Diflucan to treat presumptive yeast as we discussed. We will call you when we get the results if you need further treatment. Please make an appointment to follow-up with your primary care doctor. You should use barrier protection like a condom when having sex. If you develop any abdominal pain, fever or any other concerning complaints you should go to the emergency department.

## 2023-05-25 NOTE — ED INITIAL ASSESSMENT (HPI)
2 days of thick white vaginal discharge. + mild dysuria w/o frequency or hematuria. No itcing or abd pain.

## 2023-05-26 LAB
C TRACH DNA SPEC QL NAA+PROBE: NEGATIVE
HSV1 DNA SPEC QL NAA+PROBE: NEGATIVE
HSV2 DNA SPEC QL NAA+PROBE: POSITIVE
N GONORRHOEA DNA SPEC QL NAA+PROBE: NEGATIVE

## 2023-05-26 RX ORDER — VALACYCLOVIR HYDROCHLORIDE 1 G/1
1000 TABLET, FILM COATED ORAL 2 TIMES DAILY
Qty: 14 TABLET | Refills: 0 | Status: SHIPPED | OUTPATIENT
Start: 2023-05-26 | End: 2023-06-02

## 2023-05-26 NOTE — PROGRESS NOTES
Contacted patient regarding HSV positive results. Discussed the diagnosis of HSV with the patient at length. I did send a prescription for Valtrex to treat. She and I did discuss that this may or may not help due to the fact that she has had the lesion for 4 days. I did recommend that she follow-up with a gynecologist for further treatment if she has recurrence in the future. Answered all the patient's questions.

## 2023-05-27 LAB
GENITAL VAGINOSIS SCREEN: NEGATIVE
TRICHOMONAS SCREEN: NEGATIVE

## 2023-05-30 RX ORDER — POLYETHYLENE GLYCOL-3350 AND ELECTROLYTES 236; 6.74; 5.86; 2.97; 22.74 G/274.31G; G/274.31G; G/274.31G; G/274.31G; G/274.31G
4000 POWDER, FOR SOLUTION ORAL
COMMUNITY
Start: 2023-04-25

## 2023-05-31 ENCOUNTER — OFFICE VISIT (OUTPATIENT)
Dept: FAMILY MEDICINE CLINIC | Facility: CLINIC | Age: 23
End: 2023-05-31

## 2023-05-31 VITALS
OXYGEN SATURATION: 98 % | HEIGHT: 62 IN | HEART RATE: 82 BPM | BODY MASS INDEX: 34.04 KG/M2 | DIASTOLIC BLOOD PRESSURE: 64 MMHG | TEMPERATURE: 98 F | SYSTOLIC BLOOD PRESSURE: 118 MMHG | WEIGHT: 185 LBS | RESPIRATION RATE: 16 BRPM

## 2023-05-31 DIAGNOSIS — B00.9 HSV-2 (HERPES SIMPLEX VIRUS 2) INFECTION: Primary | ICD-10-CM

## 2023-05-31 PROCEDURE — 3008F BODY MASS INDEX DOCD: CPT

## 2023-05-31 PROCEDURE — 3078F DIAST BP <80 MM HG: CPT

## 2023-05-31 PROCEDURE — 99213 OFFICE O/P EST LOW 20 MIN: CPT

## 2023-05-31 PROCEDURE — 3074F SYST BP LT 130 MM HG: CPT

## 2023-05-31 RX ORDER — VALACYCLOVIR HYDROCHLORIDE 500 MG/1
500 TABLET, FILM COATED ORAL 2 TIMES DAILY
Qty: 6 TABLET | Refills: 1 | Status: SHIPPED | OUTPATIENT
Start: 2023-05-31 | End: 2023-06-03

## 2023-06-01 ENCOUNTER — OFFICE VISIT (OUTPATIENT)
Dept: PHYSICAL THERAPY | Age: 23
End: 2023-06-01
Attending: ADVANCED PRACTICE MIDWIFE
Payer: COMMERCIAL

## 2023-06-01 PROCEDURE — 97112 NEUROMUSCULAR REEDUCATION: CPT

## 2023-06-01 PROCEDURE — 97140 MANUAL THERAPY 1/> REGIONS: CPT

## 2023-06-01 PROCEDURE — 97110 THERAPEUTIC EXERCISES: CPT

## 2023-06-06 PROBLEM — N94.10 DYSPAREUNIA IN FEMALE: Status: ACTIVE | Noted: 2023-06-06

## 2023-06-08 ENCOUNTER — OFFICE VISIT (OUTPATIENT)
Dept: PHYSICAL THERAPY | Age: 23
End: 2023-06-08
Attending: ADVANCED PRACTICE MIDWIFE
Payer: COMMERCIAL

## 2023-06-08 PROCEDURE — 97140 MANUAL THERAPY 1/> REGIONS: CPT

## 2023-06-08 PROCEDURE — 97110 THERAPEUTIC EXERCISES: CPT

## 2023-06-08 PROCEDURE — 97112 NEUROMUSCULAR REEDUCATION: CPT

## 2023-06-12 ENCOUNTER — TELEPHONE (OUTPATIENT)
Dept: FAMILY MEDICINE CLINIC | Facility: CLINIC | Age: 23
End: 2023-06-12

## 2023-06-12 DIAGNOSIS — E66.9 CLASS 1 OBESITY WITHOUT SERIOUS COMORBIDITY WITH BODY MASS INDEX (BMI) OF 33.0 TO 33.9 IN ADULT, UNSPECIFIED OBESITY TYPE: Primary | ICD-10-CM

## 2023-06-12 NOTE — TELEPHONE ENCOUNTER
Per our face to face conversation, pt is fine to cancel today's appointment. Please advise on referral or next step for weight loss management.

## 2023-06-12 NOTE — TELEPHONE ENCOUNTER
Spoke with pt who was informed of referral. Per pt she already has an upcoming appt with REJI Bernal on 7/6 at 2:40. Pt informed to keep that upcoming appt. Pt voiced understanding.

## 2023-06-12 NOTE — TELEPHONE ENCOUNTER
Referral placed for [facility] (bariatrics).  Please give patient referral information

## 2023-06-15 ENCOUNTER — OFFICE VISIT (OUTPATIENT)
Dept: PHYSICAL THERAPY | Age: 23
End: 2023-06-15
Attending: ADVANCED PRACTICE MIDWIFE
Payer: COMMERCIAL

## 2023-06-15 PROCEDURE — 97112 NEUROMUSCULAR REEDUCATION: CPT

## 2023-06-15 PROCEDURE — 97110 THERAPEUTIC EXERCISES: CPT

## 2023-06-15 PROCEDURE — 97140 MANUAL THERAPY 1/> REGIONS: CPT

## 2023-06-19 ENCOUNTER — HOSPITAL ENCOUNTER (OUTPATIENT)
Dept: MRI IMAGING | Facility: HOSPITAL | Age: 23
Discharge: HOME | End: 2023-06-19
Attending: STUDENT IN AN ORGANIZED HEALTH CARE EDUCATION/TRAINING PROGRAM
Payer: COMMERCIAL

## 2023-06-19 DIAGNOSIS — R56.9 SEIZURE-LIKE ACTIVITY (HCC): ICD-10-CM

## 2023-06-19 DIAGNOSIS — R55 VASOVAGAL SYNCOPE: ICD-10-CM

## 2023-06-19 DIAGNOSIS — F07.81 POST CONCUSSION SYNDROME: ICD-10-CM

## 2023-06-19 DIAGNOSIS — R42 VERTIGO: ICD-10-CM

## 2023-06-19 DIAGNOSIS — R40.20 LOSS OF CONSCIOUSNESS (HCC): ICD-10-CM

## 2023-06-19 PROCEDURE — 70553 MRI BRAIN STEM W/O & W/DYE: CPT | Performed by: STUDENT IN AN ORGANIZED HEALTH CARE EDUCATION/TRAINING PROGRAM

## 2023-06-19 PROCEDURE — A9575 INJ GADOTERATE MEGLUMI 0.1ML: HCPCS | Performed by: STUDENT IN AN ORGANIZED HEALTH CARE EDUCATION/TRAINING PROGRAM

## 2023-06-19 RX ORDER — GADOTERATE MEGLUMINE 376.9 MG/ML
20 INJECTION INTRAVENOUS
Status: COMPLETED | OUTPATIENT
Start: 2023-06-19 | End: 2023-06-19

## 2023-06-19 RX ADMIN — GADOTERATE MEGLUMINE 17 ML: 376.9 INJECTION INTRAVENOUS at 13:57:00

## 2023-06-22 ENCOUNTER — OFFICE VISIT (OUTPATIENT)
Dept: PHYSICAL THERAPY | Age: 23
End: 2023-06-22
Attending: ADVANCED PRACTICE MIDWIFE
Payer: COMMERCIAL

## 2023-06-22 PROCEDURE — 97110 THERAPEUTIC EXERCISES: CPT

## 2023-06-22 PROCEDURE — 97140 MANUAL THERAPY 1/> REGIONS: CPT

## 2023-06-27 ENCOUNTER — OFFICE VISIT (OUTPATIENT)
Dept: PHYSICAL MEDICINE AND REHAB | Facility: CLINIC | Age: 23
End: 2023-06-27
Payer: COMMERCIAL

## 2023-06-27 VITALS
HEART RATE: 80 BPM | DIASTOLIC BLOOD PRESSURE: 58 MMHG | OXYGEN SATURATION: 98 % | WEIGHT: 185 LBS | SYSTOLIC BLOOD PRESSURE: 102 MMHG | BODY MASS INDEX: 34.04 KG/M2 | HEIGHT: 62 IN

## 2023-06-27 DIAGNOSIS — M25.531 RIGHT WRIST PAIN: Primary | ICD-10-CM

## 2023-06-27 PROCEDURE — 3074F SYST BP LT 130 MM HG: CPT | Performed by: PHYSICAL MEDICINE & REHABILITATION

## 2023-06-27 PROCEDURE — 3008F BODY MASS INDEX DOCD: CPT | Performed by: PHYSICAL MEDICINE & REHABILITATION

## 2023-06-27 PROCEDURE — 3078F DIAST BP <80 MM HG: CPT | Performed by: PHYSICAL MEDICINE & REHABILITATION

## 2023-06-27 PROCEDURE — 99204 OFFICE O/P NEW MOD 45 MIN: CPT | Performed by: PHYSICAL MEDICINE & REHABILITATION

## 2023-06-27 RX ORDER — MELOXICAM 15 MG/1
15 TABLET ORAL DAILY
Qty: 30 TABLET | Refills: 0 | Status: SHIPPED | OUTPATIENT
Start: 2023-06-27

## 2023-06-28 NOTE — PROGRESS NOTES
Dx: Dyspareunia in female (N80.5         Insurance   PPO         Authorized  # visits by insurance  :  10-12   Expiration date  of Authorization: (90 days from eval otherwise)  Initial POC# of visits: 10-12  Eval date/latest PN:5/23/23    Authorizing Physician: Dr. Karla Junior  Next MD visit: TBD  Fall Risk: standard         Precautions: none        Subjective: Pt states that she was fine after last session . She has not had sexual activities still due infection  concerns but states that she did have issues with LBP. She relates that she is better with BM, going every other day as she also has been eating corn. Pt did well with dilators this day    PAIN LEVEL:0/10  Assessment:    PT provided/reviewed education on  vulvar care  Due to pain noted on first layer. She tends to use organic products with vulva care. PT discussed using lubricant if were to have sexual activities       PT progressed pt from DB and DB/PF coordination with TrA facilitation with LE movements ,working towards facilitating PF and decreased LBP but no isolated movement  Additional HEP given to continue with progress gained in therapy. Tactile,verbal and written cues given for proper performance. Pt voiced understanding and performs HEP which  correctly without reported pain. Instructions were provided on how to modify as need or when to stop.      Decreased PFM tone on 1st layer, same PERF    Objective:    Left Right Comments   Superficial Transverse perineal moderate restriction minimal restriction     Bulbocavernosus moderate restriction minimal restriction     Ischiocavernosus WNL WNL     Deep Transverse Perineal minimal restriction minimal restriction     Compress Urethrae/Spinc. Urethrovaginalis    WNL minimal restriction     Pubococcygeus/Pubovaginalis minimal restriction minimal restriction     Iliococcygeus minimal restriction minimal restriction     Obturator internus minimal restriction minimal restriction     Piriformis NT internally NT internally Moderate restriction   Coccygeus Not tested Not tested           Pelvic Floor Muscle strength: (PERF= Power/Endurance/Reps/Fast) MMT: 4/5/2/10          Goals:   goals addressed this day as noted above  Goals: (to be met in 10-12 visits)     Patient will verbalize improved  understanding of  bladder/bowel habits ,bladder ,bladder retraining  and long term management: better with BM  Patient exhibits an increase in pelvic floor strength to at least half a grade or better from the CIE with ability to hold 10 sec's to improve pelvic brace with ADS and to reduce urinary frequency/urge and prevent leakage during ADS: on going  Patient to report urinary frequency to every 2-4 hours to allow for independent ADS:met  Pt will report ability to manage urge incontinence utilizing urge suppression techniques/bladder training 50% or better to minimize leakage and decrease voiding frequency for improved ADS/work efficiency: met  Patient reports a reduction in KADY 50% or better to decrease pad use from  to 0/ day or as needed.: on going  Pt will demonstrate improved strength on abdominal/ BLE muscles graded below 5/5 to half a grade or better for improved lumbopelvic hip stabilization with daily tasks to manage IAP properly: addressed today  Pt will be I with HEP,its progression and management of symptoms, biomechanical strategies and self correction of upright posturing and PFM facilitation to manage IAP with gomez tasks  Patient reports change in [place] stool #2-4 to #4 with daily bowel moment without straining and lessen use of laxative: better frequency   Pt will improve transversus abdominis/PFM facilitation/ recruitment for proper IAP management: addressed  Pt will demonstrate biomechanical strategies for managing IAP for functional transfers such as  floor to waist lifting to  safely perform daily and work tasks  Pt will have report decreased muscular tone /tenderness on global and PFM to minimal to  none in order to safely progress with PFM strengthening and stabilization  :improved  Plan:cont per POC, will assess previous treatment and continue with relaxation,      Date: 5/31/2023  TX#: 2/10-12 Date:     6/8/23          TX#: 3/10-12 Date:            6/15/23  TX#: 4/10-12 Date:   6/22/23            TX#: 5/10  FOTO Date: Tx#: 6/   Theraex: Cat and camel   2x10  Child pose 30 x 3  Happy baby 30 x3   NWB lumbar rotation x10    SL trunk rotation x10  KAUSHAL x2 mins Supine butterfly stretches x2 mins with R/L ER movements 2x10  Then PFM coordination 3 reps  Cat and camel x2x10   TrA activation  TRA with PF  TRA with PF: marches x10  TRA with PF:bridges x10  TRA with PF :with SLR  TRA with PFwith SL hip abd  TRA with PF with hip iso ball      Manual: sidelying   prone  Foam roll/ manual  MFR for paralumbars/QL   gluteal area/PSIS area /piriformis  and greater trochanter   . Informed consent for internal pelvic evaluation given: Yes    PFM :   MFR/internal vaginal work  Layers 1-3 focused on 3    Bowel massage Informed consent for internal pelvic evaluation given: Yes    PFM :   MFR/internal vaginal work  Layers 1-3 focused on 3     Informed consent for internal pelvic evaluation given: Yes    PFM :   MFR/internal vaginal work  Layers 1-3 focused on 1        NMRed: Diaphragmatic breathing x 5 reps x2 with tactile cues to expand ribs    RELAXATION    Neuro raul: long hold contractions x 3 secs then relax then 3 secs bear down/ relaxation x5 in supine then seated           :intervaginal   Diaphragmatic breathing x5 reps  Diaphragmatic breathing with PFM coordination x5   Intervaginal Neuro raul: long hold contractions x 3 secs then relax then 3 secs bear down/ relaxation    Then 3 reps above external cues   Including umbrella breathing in supine and seated x3 reps>pt relates easier in sitting     :intervaginal   Diaphragmatic breathing x5 reps  Diaphragmatic breathing with PFM coordination x5   Intervaginal Neuro raul: long hold contractions x 3 secs then relax then 3 secs bear down/ relaxation      Then 3 reps above external cues   Including umbrella breathing in supine then standing with mirror and theraband around ribs     On swiss ball  PFM coordination 10 with 3 secs hold, 1 sec hold then bear down Diaphragmatic breathing     DB with PFM coordination  TrA facilitation x5 reps    EDUCATION  PT provided/reviewed education on diaphragmatic breathing for PNS activation and pelvic floor relaxation   bowel normatives and bowel massage with hand outs given PT provided/reviewed education on dilators use for review with hand outs given      HEP GIVEN: written copy given unless otherwise indicated   6/1/23: level1 coordination  6/6/23: trunk and NWB lumbar rotation ,KAUSHAL, bowel massage    Charges: theraex x1 (13mins) ,   , man mob x 2 (23mins)        Total Timed Treatment: 36 min  Total Treatment Time: 40 min

## 2023-06-29 ENCOUNTER — APPOINTMENT (OUTPATIENT)
Dept: PHYSICAL THERAPY | Age: 23
End: 2023-06-29
Attending: ADVANCED PRACTICE MIDWIFE
Payer: COMMERCIAL

## 2023-06-29 ENCOUNTER — HOSPITAL ENCOUNTER (OUTPATIENT)
Age: 23
Discharge: HOME | End: 2023-06-29
Attending: EMERGENCY MEDICINE
Payer: COMMERCIAL

## 2023-06-29 ENCOUNTER — TELEPHONE (OUTPATIENT)
Dept: PHYSICAL THERAPY | Facility: HOSPITAL | Age: 23
End: 2023-06-29

## 2023-06-29 VITALS
DIASTOLIC BLOOD PRESSURE: 59 MMHG | SYSTOLIC BLOOD PRESSURE: 118 MMHG | TEMPERATURE: 97 F | OXYGEN SATURATION: 97 % | RESPIRATION RATE: 20 BRPM | HEART RATE: 79 BPM

## 2023-06-29 DIAGNOSIS — K60.2 RECTAL FISSURE: Primary | ICD-10-CM

## 2023-06-29 PROCEDURE — 99213 OFFICE O/P EST LOW 20 MIN: CPT

## 2023-06-29 PROCEDURE — 99212 OFFICE O/P EST SF 10 MIN: CPT

## 2023-06-29 NOTE — DISCHARGE INSTRUCTIONS
Thank you for visiting our immediate care for your health care needs. Please follow up with your regular doctor in the next 1-2 days. If you have any additional problems please return to the immediate care.

## 2023-07-05 NOTE — PROGRESS NOTES
Dx: Dyspareunia in female (N80.5         Insurance   PPO         Authorized  # visits by insurance  :  10-12   Expiration date  of Authorization: (90 days from eval otherwise)  Initial POC# of visits: 10-12  Eval date/latest PN:5/23/23    Authorizing Physician: Dr. Kameron Sainz  Next MD visit: TBD  Fall Risk: standard         Precautions: none        Subjective: Pt states that she had issues with anal fissure due to her constipation. She relates that she is doing better. She goes at least 3 x a week . She  did go to IC last week and was told to hold off PT hat day and go when she is ready. She is due to a colonoscopy next week. She reports that the fissure is better. She did not have any issues with additional exercises from last sessionPt states that she had not had sexual intercourse but able to do dilators x level 3    PAIN LEVEL:0/10  Assessment:   PT continued with PFM release and decreased tone noted on the 2nd and 3rd layer. Pt does well with contraction but still needs cues for bearing down.  She tends to contract when assessed both internal and external cues so trial of sitting on toel for better proprioception    PT had pt watch Bowel positioning and motion to assist with proprioception, Misti Clemons video of MOO for bowel movement, She relates that she tends to push with stomach so video shown to assist with more relaxing and anal opening witg BM in addition to her squatty potty     PT continued with TRA with increased difficulty and emphasis on IR and hip extensors      Objective:    Left Right Comments   Superficial Transverse perineal minimal restriction minimal restriction     Bulbocavernosus Min-mod  minimal restriction     Ischiocavernosus WNL WNL     Deep Transverse Perineal WNL WNL     Compress Urethrae/Spinc. Urethrovaginalis    WNL minimal restriction     Pubococcygeus/Pubovaginalis WNL WNL     Iliococcygeus WNL WNL     Obturator internus WNL WNL     Piriformis NT internally NT internally Moderate restriction   Coccygeus Not tested Not tested           Pelvic Floor Muscle strength: (PERF= Power/Endurance/Reps/Fast) MMT: 4/5/2/10    Goals:   goals addressed this day as noted above  Goals: (to be met in 10-12 visits)     Patient will verbalize improved  understanding of  bladder/bowel habits ,bladder ,bladder retraining  and long term management: better with BM  Patient exhibits an increase in pelvic floor strength to at least half a grade or better from the CIE with ability to hold 10 sec's to improve pelvic brace with ADS and to reduce urinary frequency/urge and prevent leakage during ADS: on going  Patient to report urinary frequency to every 2-4 hours to allow for independent ADS:met  Pt will report ability to manage urge incontinence utilizing urge suppression techniques/bladder training 50% or better to minimize leakage and decrease voiding frequency for improved ADS/work efficiency: met  Patient reports a reduction in KADY 50% or better to decrease pad use from  to 0/ day or as needed.: on going  Pt will demonstrate improved strength on abdominal/ BLE muscles graded below 5/5 to half a grade or better for improved lumbopelvic hip stabilization with daily tasks to manage IAP properly: addressed today  Pt will be I with HEP,its progression and management of symptoms, biomechanical strategies and self correction of upright posturing and PFM facilitation to manage IAP with gomez tasks  Patient reports change in [place] stool #2-4 to #4 with daily bowel moment without straining and lessen use of laxative: better frequency   Pt will improve transversus abdominis/PFM facilitation/ recruitment for proper IAP management: addressed  Pt will demonstrate biomechanical strategies for managing IAP for functional transfers such as  floor to waist lifting to  safely perform daily and work tasks  Pt will have report decreased muscular tone /tenderness on global and PFM to minimal to  none in order to safely progress with PFM strengthening and stabilization  :improved  Plan:cont per POC, will assess previous treatment and continue with relaxation,      Date: 5/31/2023  TX#: 2/10-12 Date:     6/8/23          [place]#: 3/10-12 Date:            6/15/23  TX#: 4/10-12 Date:   6/22/23            TX#: 5/10  FOTO Date: 7/6/23  Tx#: 6/10   Theraex: Cat and camel   2x10  Child pose 30 x 3  Happy baby 30 x3   NWB lumbar rotation x10    SL trunk rotation x10  KAUSHAL x2 mins Supine butterfly stretches x2 mins with R/L ER movements 2x10  Then PFM coordination 3 reps  Cat and camel x2x10   TrA activation  TRA with PF  TRA with PF: marches x10  TRA with PF:bridges x10  TRA with PF :with SLR  TRA with PFwith SL hip abd  TRA with PF with hip iso ball   TRA SLR 1x10  TRA with reverse clams 2x10  Prone hip extension 2x10     Manual: sidelying   prone  Foam roll/ manual  MFR for paralumbars/QL   gluteal area/PSIS area /piriformis  and greater trochanter   . Informed consent for internal pelvic evaluation given: Yes    PFM :   MFR/internal vaginal work  Layers 1-3 focused on 3    Bowel massage Informed consent for internal pelvic evaluation given: Yes    PFM :   MFR/internal vaginal work  Layers 1-3 focused on 3     Informed consent for internal pelvic evaluation given: Yes    PFM :   MFR/internal vaginal work  Layers 1-3 focused on 1     Informed consent for internal pelvic evaluation given: Yes    PFM :   MFR/internal vaginal work  Layers 1-3 focused on 1     NMRed: Diaphragmatic breathing x 5 reps x2 with tactile cues to expand ribs    RELAXATION    Neuro raul: long hold contractions x 3 secs then relax then 3 secs bear down/ relaxation x5 in supine then seated           :intervaginal   Diaphragmatic breathing x5 reps  Diaphragmatic breathing with PFM coordination x5   Intervaginal Neuro raul: long hold contractions x 3 secs then relax then 3 secs bear down/ relaxation    Then 3 reps above external cues   Including umbrella breathing in supine and seated x3 reps>pt relates easier in sitting     :intervaginal   Diaphragmatic breathing x5 reps  Diaphragmatic breathing with PFM coordination x5   Intervaginal Neuro raul: long hold contractions x 3 secs then relax then 3 secs bear down/ relaxation      Then 3 reps above external cues   Including umbrella breathing in supine then standing with mirror and theraband around ribs     On swiss ball  PFM coordination 10 with 3 secs hold, 1 sec hold then bear down Diaphragmatic breathing     DB with PFM coordination  TrA facilitation x5 reps RELAXATION :intervaginal       Diaphragmatic breathing x5 reps    Diaphragmatic breathing with PFM coordination x5     Intervaginal Neuro raul: long hold contractions x 3 secs then relax then 3 secs bear down/ relaxation      EXTERNAL     Diaphragmatic breathing x 5 reps  Diaphragmatic breathing with PFM coordination x5    Neuro raul: long hold contractions x 3 secs then relax then 3 secs bear down/ relaxation    Bearing down with PT external cues x3   EDUCATION  PT provided/reviewed education on diaphragmatic breathing for PNS activation and pelvic floor relaxation   bowel normatives and bowel massage with hand outs given PT provided/reviewed education on dilators use for review with hand outs given  PT  and seated with towel then seated   PT provided/reviewed education on \"Moo approach on BM positioning     HEP GIVEN: written copy given unless otherwise indicated   6/1/23: level1 coordination  6/6/23: trunk and NWB lumbar rotation ,KAUSHAL, bowel massage    Charges: theraex x1 (10mins) ,   , man mob x 2 (23mins)  , neuro raul  x (12 mins)      Total Timed Treatment: 45min  Total Treatment Time: 45min

## 2023-07-06 ENCOUNTER — OFFICE VISIT (OUTPATIENT)
Dept: PHYSICAL THERAPY | Age: 23
End: 2023-07-06
Attending: ADVANCED PRACTICE MIDWIFE
Payer: COMMERCIAL

## 2023-07-06 ENCOUNTER — OFFICE VISIT (OUTPATIENT)
Dept: SURGERY | Facility: CLINIC | Age: 23
End: 2023-07-06
Payer: COMMERCIAL

## 2023-07-06 VITALS
BODY MASS INDEX: 32.15 KG/M2 | DIASTOLIC BLOOD PRESSURE: 70 MMHG | WEIGHT: 186 LBS | OXYGEN SATURATION: 98 % | HEART RATE: 82 BPM | SYSTOLIC BLOOD PRESSURE: 102 MMHG | HEIGHT: 63.8 IN

## 2023-07-06 DIAGNOSIS — K59.00 CONSTIPATION, UNSPECIFIED CONSTIPATION TYPE: ICD-10-CM

## 2023-07-06 DIAGNOSIS — E66.9 OBESITY (BMI 30-39.9): ICD-10-CM

## 2023-07-06 DIAGNOSIS — M54.9 CHRONIC BACK PAIN, UNSPECIFIED BACK LOCATION, UNSPECIFIED BACK PAIN LATERALITY: ICD-10-CM

## 2023-07-06 DIAGNOSIS — R63.2 INCREASED APPETITE: Primary | ICD-10-CM

## 2023-07-06 DIAGNOSIS — G89.29 CHRONIC BACK PAIN, UNSPECIFIED BACK LOCATION, UNSPECIFIED BACK PAIN LATERALITY: ICD-10-CM

## 2023-07-06 PROCEDURE — 97140 MANUAL THERAPY 1/> REGIONS: CPT

## 2023-07-06 PROCEDURE — 3074F SYST BP LT 130 MM HG: CPT | Performed by: NURSE PRACTITIONER

## 2023-07-06 PROCEDURE — 99204 OFFICE O/P NEW MOD 45 MIN: CPT | Performed by: NURSE PRACTITIONER

## 2023-07-06 PROCEDURE — 3008F BODY MASS INDEX DOCD: CPT | Performed by: NURSE PRACTITIONER

## 2023-07-06 PROCEDURE — 97112 NEUROMUSCULAR REEDUCATION: CPT

## 2023-07-06 PROCEDURE — 97110 THERAPEUTIC EXERCISES: CPT

## 2023-07-06 PROCEDURE — 3078F DIAST BP <80 MM HG: CPT | Performed by: NURSE PRACTITIONER

## 2023-07-06 NOTE — PATIENT INSTRUCTIONS
Qsymia  Phentermine + topiramate    Contrave  Naltrexone + wellbutrine    Saxenda  Wegovy  Injections     Magnesium glycinate 500 mg/day for sleep. Life Extension. NOW. Garden of Life. Pure Encapsulations. Organic senna tea. Organic. I recommend a whole food, plant powered diet with low glycemic index:     Aim for 3 meals a day and 1-2 snacks as needed. Aim for a protein + produce at each meal time. Keep meals between 7 am and 7 pm.     Breakfast ideas:  1. Fruit and nuts/seeds. 2. Eggs scrambled with vegetables. 3. Oatmeal (stovetop), cinnamon, 2 tbsp flaxseed, berries, nuts. 4. Protein shake + fruit. (1 scoop with water/ice). Garden [address], [place], [place], and [place] are good brands. Snacks:  Raw vegetables and hummus, apples and peanut butter, nuts, seeds, fruit, pecans drizzled with organic honey. Use the Healthy Plate method for lunch and dinner:  1/2 right side of plate non-starchy vegetables. Bottom left 1/4 plate protein. Top left 1/4 starch as desired. Aim for a total of:  2 fruits a day (avocado, tomato, citrus/oranges, apples, berries)  1/2 cup or medium size    4 non-starchy vegetables (handful) (greens, peppers, onions, garlic, broccoli, cauliflower, etc.)    0-2 starches (oatmeal, sweet potato, carrots, brown rice, etc). 3 protein per day (fish, seafood, meat, or plants: salmon, nuts, seeds, shrimp, chicken, turkey, beans, lentils, chickpeas, etc).     2 healthy fats (avocado, avocado oil, olives, olive oil, salmon, nuts, seeds)

## 2023-07-07 ENCOUNTER — TELEPHONE (OUTPATIENT)
Facility: CLINIC | Age: 23
End: 2023-07-07

## 2023-07-07 LAB
ANION GAP SERPL CALC-SCNC: 7 MMOL/L (ref 0–18)
B-HCG SERPL-ACNC: <1 MIU/ML
B-HCG UR QL: NEGATIVE
BASOPHILS # BLD AUTO: 0.03 X10(3) UL (ref 0–0.2)
BASOPHILS NFR BLD AUTO: 0.4 %
BILIRUB UR QL: NEGATIVE
BUN BLD-MCNC: 8 MG/DL (ref 7–18)
BUN/CREAT SERPL: 12.1 (ref 10–20)
CALCIUM BLD-MCNC: 9.1 MG/DL (ref 8.5–10.1)
CHLORIDE SERPL-SCNC: 107 MMOL/L (ref 98–112)
CO2 SERPL-SCNC: 28 MMOL/L (ref 21–32)
COLOR UR: YELLOW
CREAT BLD-MCNC: 0.66 MG/DL
DEPRECATED RDW RBC AUTO: 44.4 FL (ref 35.1–46.3)
EOSINOPHIL # BLD AUTO: 0.07 X10(3) UL (ref 0–0.7)
EOSINOPHIL NFR BLD AUTO: 1 %
ERYTHROCYTE [DISTWIDTH] IN BLOOD BY AUTOMATED COUNT: 14.6 % (ref 11–15)
GFR SERPLBLD BASED ON 1.73 SQ M-ARVRAT: 127 ML/MIN/1.73M2 (ref 60–?)
GLUCOSE BLD-MCNC: 119 MG/DL (ref 70–99)
GLUCOSE UR-MCNC: NORMAL MG/DL
HCT VFR BLD AUTO: 37.5 %
HGB BLD-MCNC: 11.9 G/DL
IMM GRANULOCYTES # BLD AUTO: 0.01 X10(3) UL (ref 0–1)
IMM GRANULOCYTES NFR BLD: 0.1 %
KETONES UR-MCNC: NEGATIVE MG/DL
LEUKOCYTE ESTERASE UR QL STRIP.AUTO: 75
LYMPHOCYTES # BLD AUTO: 3.2 X10(3) UL (ref 1–4)
LYMPHOCYTES NFR BLD AUTO: 44.4 %
MCH RBC QN AUTO: 26.7 PG (ref 26–34)
MCHC RBC AUTO-ENTMCNC: 31.7 G/DL (ref 31–37)
MCV RBC AUTO: 84.1 FL
MONOCYTES # BLD AUTO: 0.38 X10(3) UL (ref 0.1–1)
MONOCYTES NFR BLD AUTO: 5.3 %
NEUTROPHILS # BLD AUTO: 3.51 X10 (3) UL (ref 1.5–7.7)
NEUTROPHILS # BLD AUTO: 3.51 X10(3) UL (ref 1.5–7.7)
NEUTROPHILS NFR BLD AUTO: 48.8 %
NITRITE UR QL STRIP.AUTO: NEGATIVE
OSMOLALITY SERPL CALC.SUM OF ELEC: 293 MOSM/KG (ref 275–295)
PH UR: 5.5 [PH] (ref 5–8)
PLATELET # BLD AUTO: 260 10(3)UL (ref 150–450)
POTASSIUM SERPL-SCNC: 3.8 MMOL/L (ref 3.5–5.1)
PROT UR-MCNC: 50 MG/DL
RBC # BLD AUTO: 4.46 X10(6)UL
RBC #/AREA URNS AUTO: >10 /HPF
RH BLOOD TYPE: POSITIVE
SODIUM SERPL-SCNC: 142 MMOL/L (ref 136–145)
SP GR UR STRIP: >1.03 (ref 1–1.03)
UROBILINOGEN UR STRIP-ACNC: 2
WBC # BLD AUTO: 7.2 X10(3) UL (ref 4–11)

## 2023-07-07 PROCEDURE — 84702 CHORIONIC GONADOTROPIN TEST: CPT

## 2023-07-07 PROCEDURE — 81025 URINE PREGNANCY TEST: CPT

## 2023-07-07 PROCEDURE — 81001 URINALYSIS AUTO W/SCOPE: CPT

## 2023-07-07 PROCEDURE — 86900 BLOOD TYPING SEROLOGIC ABO: CPT | Performed by: EMERGENCY MEDICINE

## 2023-07-07 PROCEDURE — 99284 EMERGENCY DEPT VISIT MOD MDM: CPT

## 2023-07-07 PROCEDURE — 86901 BLOOD TYPING SEROLOGIC RH(D): CPT

## 2023-07-07 PROCEDURE — 96374 THER/PROPH/DIAG INJ IV PUSH: CPT

## 2023-07-07 PROCEDURE — 81025 URINE PREGNANCY TEST: CPT | Performed by: EMERGENCY MEDICINE

## 2023-07-07 PROCEDURE — 84702 CHORIONIC GONADOTROPIN TEST: CPT | Performed by: EMERGENCY MEDICINE

## 2023-07-07 PROCEDURE — 87086 URINE CULTURE/COLONY COUNT: CPT | Performed by: EMERGENCY MEDICINE

## 2023-07-07 PROCEDURE — 86901 BLOOD TYPING SEROLOGIC RH(D): CPT | Performed by: EMERGENCY MEDICINE

## 2023-07-07 PROCEDURE — 80048 BASIC METABOLIC PNL TOTAL CA: CPT

## 2023-07-07 PROCEDURE — 85025 COMPLETE CBC W/AUTO DIFF WBC: CPT

## 2023-07-07 PROCEDURE — 85025 COMPLETE CBC W/AUTO DIFF WBC: CPT | Performed by: EMERGENCY MEDICINE

## 2023-07-07 PROCEDURE — 86900 BLOOD TYPING SEROLOGIC ABO: CPT

## 2023-07-07 PROCEDURE — 87086 URINE CULTURE/COLONY COUNT: CPT

## 2023-07-07 PROCEDURE — 81001 URINALYSIS AUTO W/SCOPE: CPT | Performed by: EMERGENCY MEDICINE

## 2023-07-07 PROCEDURE — 80048 BASIC METABOLIC PNL TOTAL CA: CPT | Performed by: EMERGENCY MEDICINE

## 2023-07-07 NOTE — TELEPHONE ENCOUNTER
Pt states she is returning a call in regards to her scheduled CLN 7/10/23. That appointment is currently showing as cancelled.   Please call

## 2023-07-07 NOTE — TELEPHONE ENCOUNTER
GI Schedulers,     I spoke with pt, name/ verified. I told her that her procedure for 7/10 was canceled d/t to \" Patient had a positive pregnancy\". She said her pregnancy test is negative. \" I did not cancel. They told me they will call me but I never got a call\". I informed pt that I will send message to our schedulers to assist her to reschedule for her procedure and I apologized for the confusion. I told pt not to come on Monday as her procedure was cancelled. Patient verbalized understanding. GI Schedulers,     Please assist pt with rescheduling procedure. (Egd/colonoscopy)    Thank you.

## 2023-07-08 ENCOUNTER — HOSPITAL ENCOUNTER (EMERGENCY)
Facility: HOSPITAL | Age: 23
Discharge: HOME | End: 2023-07-08
Attending: EMERGENCY MEDICINE
Payer: COMMERCIAL

## 2023-07-08 VITALS
SYSTOLIC BLOOD PRESSURE: 102 MMHG | OXYGEN SATURATION: 98 % | TEMPERATURE: 98 F | DIASTOLIC BLOOD PRESSURE: 64 MMHG | HEART RATE: 67 BPM | RESPIRATION RATE: 18 BRPM

## 2023-07-08 DIAGNOSIS — O02.1 MISSED ABORTION: Primary | ICD-10-CM

## 2023-07-08 RX ORDER — KETOROLAC TROMETHAMINE 30 MG/ML
30 INJECTION, SOLUTION INTRAMUSCULAR; INTRAVENOUS ONCE
Status: COMPLETED | OUTPATIENT
Start: 2023-07-08 | End: 2023-07-08
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
## 2023-07-08 NOTE — PROGRESS NOTES
07/08/23 0323   Clinical Encounter Type   Visited With Patient   Crisis Visit ED  (Fetal demise)   Referral From Verbal;Nurse   Referral To    Taxonomy   Intended Effects Journeying with someone in the grief process   Methods Offer spiritual/[place] support; Offer emotional support;Encourage sharing of feelings;Encourage self reflection;Encourage self care   Interventions Acknowledge current situation; Active listening;Ask questions to bring forth feelings;[place];Provide grief resources     Discussion:  received verbal referral from RN for pt who miscarried. Pt seen sitting up in bed in ED, alert and conversant. Pt described feeling numb about her loss, as this is fourth pregnancy she has lost.  provided emotional and spiritual support, active listening, and a brief prayer, as she was getting ready to leave; encouraged seeking support; provided resources on grief and bereavement support groups, as well as doing something to honor the life that was lost. Pt given Spiritual Care number to call as well as prenatal bereavement coordinator's number for questions or additional support.     Breezy Lancaster, [address] [place] [address] Resident  [id number]

## 2023-07-08 NOTE — DISCHARGE INSTRUCTIONS
Return if increased bleeding or pain or if you have bleeding more than a pad an hour for more than 3 hours in a row  Follow-up with your OB next week

## 2023-07-08 NOTE — ED INITIAL ASSESSMENT (HPI)
Pt to ED for vaginal bleeding that started two hours. Per pt, she had a syncopal episode that lasted 3 minutes while sitting, and upon waking, pt had heavy bleeding and cramping that has persisted. Pt has gone through 2 pads and 2 pairs of period underwear in that time period. Pt recently had a positive pregnancy test a few days ago, last menstrual cycle was 5/9/2023.

## 2023-07-10 ENCOUNTER — OFFICE VISIT (OUTPATIENT)
Dept: FAMILY MEDICINE CLINIC | Facility: CLINIC | Age: 23
End: 2023-07-10

## 2023-07-10 VITALS
BODY MASS INDEX: 32 KG/M2 | HEART RATE: 62 BPM | WEIGHT: 188 LBS | SYSTOLIC BLOOD PRESSURE: 104 MMHG | DIASTOLIC BLOOD PRESSURE: 62 MMHG

## 2023-07-10 DIAGNOSIS — O03.9 SPONTANEOUS ABORTION: Primary | ICD-10-CM

## 2023-07-10 DIAGNOSIS — N96 HISTORY OF MULTIPLE SPONTANEOUS ABORTIONS: ICD-10-CM

## 2023-07-10 NOTE — ASSESSMENT & PLAN NOTE
Physical  rest for 3 days  Pelvic rest until all bleeding has completely stopped  Prenatal vitamins-continue daily  Refer ob gyne for multiple sp ab. Please call if symptoms worsen or are not resolving. Discussed w pt red flag symptoms that if they occur, pt should immediately go to ER. Pt states understanding.

## 2023-07-10 NOTE — TELEPHONE ENCOUNTER
Dr. Delroy Barrios,    Pt is rescheduled for EGD/CLN on 10/23/23. She went to ED on 23 for missed , had 3 previous miscarriages. Just letting you know. Thank you.

## 2023-07-13 ENCOUNTER — TELEPHONE (OUTPATIENT)
Dept: PHYSICAL THERAPY | Facility: HOSPITAL | Age: 23
End: 2023-07-13

## 2023-07-13 ENCOUNTER — APPOINTMENT (OUTPATIENT)
Dept: PHYSICAL THERAPY | Age: 23
End: 2023-07-13
Attending: ADVANCED PRACTICE MIDWIFE
Payer: COMMERCIAL

## 2023-07-18 ENCOUNTER — TELEPHONE (OUTPATIENT)
Dept: FAMILY MEDICINE CLINIC | Facility: CLINIC | Age: 23
End: 2023-07-18

## 2023-07-18 NOTE — TELEPHONE ENCOUNTER
Pt stated job requesting RTW to state she can do all duties ,letter sent , previous letter had no restrictions

## 2023-07-19 ENCOUNTER — OFFICE VISIT (OUTPATIENT)
Dept: NEUROLOGY | Facility: CLINIC | Age: 23
End: 2023-07-19
Payer: COMMERCIAL

## 2023-07-19 VITALS — WEIGHT: 188 LBS | BODY MASS INDEX: 34.6 KG/M2 | OXYGEN SATURATION: 98 % | HEIGHT: 62 IN

## 2023-07-19 DIAGNOSIS — R40.20 LOSS OF CONSCIOUSNESS (HCC): Primary | ICD-10-CM

## 2023-07-19 DIAGNOSIS — F07.81 POST CONCUSSION SYNDROME: ICD-10-CM

## 2023-07-19 DIAGNOSIS — R42 VERTIGO: ICD-10-CM

## 2023-07-19 DIAGNOSIS — R56.9 SEIZURE-LIKE ACTIVITY (HCC): ICD-10-CM

## 2023-07-19 DIAGNOSIS — R55 VASOVAGAL SYNCOPE: ICD-10-CM

## 2023-07-19 PROCEDURE — 99213 OFFICE O/P EST LOW 20 MIN: CPT | Performed by: STUDENT IN AN ORGANIZED HEALTH CARE EDUCATION/TRAINING PROGRAM

## 2023-07-19 PROCEDURE — 3008F BODY MASS INDEX DOCD: CPT | Performed by: STUDENT IN AN ORGANIZED HEALTH CARE EDUCATION/TRAINING PROGRAM

## 2023-07-20 ENCOUNTER — APPOINTMENT (OUTPATIENT)
Dept: PHYSICAL THERAPY | Age: 23
End: 2023-07-20
Attending: ADVANCED PRACTICE MIDWIFE
Payer: COMMERCIAL

## 2023-07-27 ENCOUNTER — APPOINTMENT (OUTPATIENT)
Dept: PHYSICAL THERAPY | Age: 23
End: 2023-07-27
Attending: ADVANCED PRACTICE MIDWIFE
Payer: COMMERCIAL

## 2023-08-07 ENCOUNTER — MED REC SCAN ONLY (OUTPATIENT)
Dept: PHYSICAL MEDICINE AND REHAB | Facility: CLINIC | Age: 23
End: 2023-08-07

## 2023-08-09 ENCOUNTER — TELEMEDICINE (OUTPATIENT)
Dept: SURGERY | Facility: CLINIC | Age: 23
End: 2023-08-09
Payer: COMMERCIAL

## 2023-08-09 VITALS — BODY MASS INDEX: 34 KG/M2 | WEIGHT: 187 LBS

## 2023-08-09 DIAGNOSIS — M54.9 CHRONIC BACK PAIN, UNSPECIFIED BACK LOCATION, UNSPECIFIED BACK PAIN LATERALITY: ICD-10-CM

## 2023-08-09 DIAGNOSIS — E66.9 OBESITY (BMI 30-39.9): ICD-10-CM

## 2023-08-09 DIAGNOSIS — R63.2 INCREASED APPETITE: ICD-10-CM

## 2023-08-09 DIAGNOSIS — Z51.81 ENCOUNTER FOR THERAPEUTIC DRUG MONITORING: Primary | ICD-10-CM

## 2023-08-09 DIAGNOSIS — K59.00 CONSTIPATION, UNSPECIFIED CONSTIPATION TYPE: ICD-10-CM

## 2023-08-09 DIAGNOSIS — G89.29 CHRONIC BACK PAIN, UNSPECIFIED BACK LOCATION, UNSPECIFIED BACK PAIN LATERALITY: ICD-10-CM

## 2023-08-09 PROCEDURE — 99213 OFFICE O/P EST LOW 20 MIN: CPT | Performed by: NURSE PRACTITIONER

## 2023-08-09 RX ORDER — PHENTERMINE HYDROCHLORIDE 15 MG/1
15 CAPSULE ORAL EVERY MORNING
Qty: 30 CAPSULE | Refills: 2 | Status: SHIPPED | OUTPATIENT
Start: 2023-08-09

## 2023-08-23 ENCOUNTER — OFFICE VISIT (OUTPATIENT)
Dept: FAMILY MEDICINE CLINIC | Facility: CLINIC | Age: 23
End: 2023-08-23

## 2023-08-23 VITALS
SYSTOLIC BLOOD PRESSURE: 109 MMHG | DIASTOLIC BLOOD PRESSURE: 71 MMHG | WEIGHT: 183 LBS | BODY MASS INDEX: 33.68 KG/M2 | HEART RATE: 101 BPM | HEIGHT: 62 IN

## 2023-08-23 DIAGNOSIS — E66.01 MORBID (SEVERE) OBESITY DUE TO EXCESS CALORIES (HCC): ICD-10-CM

## 2023-08-23 DIAGNOSIS — N92.6 IRREGULAR MENSTRUAL CYCLE: ICD-10-CM

## 2023-08-23 DIAGNOSIS — M54.42 CHRONIC LEFT-SIDED LOW BACK PAIN WITH LEFT-SIDED SCIATICA: ICD-10-CM

## 2023-08-23 DIAGNOSIS — E16.1 HYPERINSULINEMIA: ICD-10-CM

## 2023-08-23 DIAGNOSIS — G89.29 CHRONIC LEFT-SIDED LOW BACK PAIN WITH LEFT-SIDED SCIATICA: ICD-10-CM

## 2023-08-23 DIAGNOSIS — K59.00 CONSTIPATION, UNSPECIFIED CONSTIPATION TYPE: ICD-10-CM

## 2023-08-23 DIAGNOSIS — N96 HISTORY OF MULTIPLE SPONTANEOUS ABORTIONS: ICD-10-CM

## 2023-08-23 DIAGNOSIS — Z01.419 WELL WOMAN EXAM WITH ROUTINE GYNECOLOGICAL EXAM: Primary | ICD-10-CM

## 2023-08-23 DIAGNOSIS — E66.9 OBESITY (BMI 30-39.9): ICD-10-CM

## 2023-08-23 PROBLEM — K92.0 HEMATEMESIS WITHOUT NAUSEA: Status: RESOLVED | Noted: 2019-07-24 | Resolved: 2023-08-23

## 2023-08-23 PROBLEM — N30.00 ACUTE CYSTITIS WITHOUT HEMATURIA: Status: RESOLVED | Noted: 2020-02-25 | Resolved: 2023-08-23

## 2023-08-23 PROCEDURE — 3074F SYST BP LT 130 MM HG: CPT | Performed by: NURSE PRACTITIONER

## 2023-08-23 PROCEDURE — 99213 OFFICE O/P EST LOW 20 MIN: CPT | Performed by: NURSE PRACTITIONER

## 2023-08-23 PROCEDURE — 99395 PREV VISIT EST AGE 18-39: CPT | Performed by: NURSE PRACTITIONER

## 2023-08-23 PROCEDURE — 3078F DIAST BP <80 MM HG: CPT | Performed by: NURSE PRACTITIONER

## 2023-08-23 PROCEDURE — 3008F BODY MASS INDEX DOCD: CPT | Performed by: NURSE PRACTITIONER

## 2023-08-24 LAB
C TRACH DNA SPEC QL NAA+PROBE: NEGATIVE
N GONORRHOEA DNA SPEC QL NAA+PROBE: NEGATIVE

## 2023-08-24 NOTE — ASSESSMENT & PLAN NOTE
It is essential that you decrease the amount of carbohydrates that you ingest (bread products, rice, pasta, potatoes, starchy vegetables and sugary beverages). Also try to increase the amount of vegetables and protein that you eat daily. Decrease the amount of processed and fast foods. Try to exercise at least 30 minutes per day, 4-5 times per week.    Check insulin levels

## 2023-08-24 NOTE — ASSESSMENT & PLAN NOTE
Screening labs  Please aim to eat a diet high in fresh fruits and vegetables, lean protein sources, complex carbohydrates and limited processed and fast foods. Try to get at least 150 minutes of exercise per week-a combination of weight resistance and cardio is preferred.     Pap w g/c, hpv ref and vag culture  Encourage safe sex practices-pt is currently not sexually active and not interested in birth control

## 2023-08-24 NOTE — ASSESSMENT & PLAN NOTE
Check hormone levels Take over the counter ibuprofen or Tylenol for pain as directed per label.    Wear the walking boot  for 1 to 2 weeks at all times, follow RICE (information given in discharge instructions), and follow up in one week with your primary care doctor or Orthopedic doctor for reevaluation of right foot sprain. Return immediately to ER for any worsening of pain or swelling, any redness appears at injury site, Fever of 100.4°F (38°C) or above lasting for 24 to 48 hours, the toes on the injured foot  become cold, blue, numb, or tingly, or for any concerns.

## 2023-08-24 NOTE — ASSESSMENT & PLAN NOTE
Lumbar spine xray  Ice 20 min 4-6 times per day  Do not use heat on injury  Do not apply ice directly on skin, make certain a thin cloth is between skin and ice pack    Ibuprofen 600 mg I po three times per day as needed w food  Please call if symptoms worsen or are not resolving.

## 2023-08-25 ENCOUNTER — HOSPITAL ENCOUNTER (OUTPATIENT)
Dept: GENERAL RADIOLOGY | Facility: HOSPITAL | Age: 23
Discharge: HOME | End: 2023-08-25
Attending: NURSE PRACTITIONER
Payer: COMMERCIAL

## 2023-08-25 ENCOUNTER — LAB ENCOUNTER (OUTPATIENT)
Dept: LAB | Facility: HOSPITAL | Age: 23
End: 2023-08-25
Attending: NURSE PRACTITIONER
Payer: COMMERCIAL

## 2023-08-25 DIAGNOSIS — G89.29 CHRONIC LEFT-SIDED LOW BACK PAIN WITH LEFT-SIDED SCIATICA: ICD-10-CM

## 2023-08-25 DIAGNOSIS — Z01.419 WELL WOMAN EXAM WITH ROUTINE GYNECOLOGICAL EXAM: ICD-10-CM

## 2023-08-25 DIAGNOSIS — E16.1 HYPERINSULINEMIA: ICD-10-CM

## 2023-08-25 DIAGNOSIS — M54.42 CHRONIC LEFT-SIDED LOW BACK PAIN WITH LEFT-SIDED SCIATICA: ICD-10-CM

## 2023-08-25 DIAGNOSIS — N92.6 IRREGULAR MENSTRUAL CYCLE: ICD-10-CM

## 2023-08-25 LAB
ALBUMIN SERPL-MCNC: 3.6 G/DL (ref 3.4–5)
ALBUMIN/GLOB SERPL: 1 {RATIO} (ref 1–2)
ALP LIVER SERPL-CCNC: 76 U/L
ALT SERPL-CCNC: 8 U/L
ANION GAP SERPL CALC-SCNC: 8 MMOL/L (ref 0–18)
AST SERPL-CCNC: 6 U/L (ref 15–37)
BILIRUB SERPL-MCNC: 0.9 MG/DL (ref 0.1–2)
BUN BLD-MCNC: 7 MG/DL (ref 7–18)
BUN/CREAT SERPL: 10.3 (ref 10–20)
CALCIUM BLD-MCNC: 8.8 MG/DL (ref 8.5–10.1)
CHLORIDE SERPL-SCNC: 109 MMOL/L (ref 98–112)
CHOLEST SERPL-MCNC: 107 MG/DL (ref ?–200)
CO2 SERPL-SCNC: 26 MMOL/L (ref 21–32)
CREAT BLD-MCNC: 0.68 MG/DL
DEPRECATED RDW RBC AUTO: 44.1 FL (ref 35.1–46.3)
EGFRCR SERPLBLD CKD-EPI 2021: 126 ML/MIN/1.73M2 (ref 60–?)
ERYTHROCYTE [DISTWIDTH] IN BLOOD BY AUTOMATED COUNT: 14.6 % (ref 11–15)
EST. AVERAGE GLUCOSE BLD GHB EST-MCNC: 94 MG/DL (ref 68–126)
FASTING PATIENT LIPID ANSWER: YES
FASTING STATUS PATIENT QL REPORTED: YES
FSH SERPL-ACNC: 3.9 MIU/ML
GENITAL VAGINOSIS SCREEN: POSITIVE
GLOBULIN PLAS-MCNC: 3.7 G/DL (ref 2.8–4.4)
GLUCOSE BLD-MCNC: 90 MG/DL (ref 70–99)
HBA1C MFR BLD: 4.9 % (ref ?–5.7)
HCT VFR BLD AUTO: 35.8 %
HDLC SERPL-MCNC: 36 MG/DL (ref 40–59)
HGB BLD-MCNC: 11.6 G/DL
INSULIN SERPL-ACNC: 17.1 MU/L (ref 3–25)
LDLC SERPL CALC-MCNC: 53 MG/DL (ref ?–100)
LH SERPL-ACNC: 15.1 MIU/ML
MCH RBC QN AUTO: 27 PG (ref 26–34)
MCHC RBC AUTO-ENTMCNC: 32.4 G/DL (ref 31–37)
MCV RBC AUTO: 83.4 FL
NONHDLC SERPL-MCNC: 71 MG/DL (ref ?–130)
OSMOLALITY SERPL CALC.SUM OF ELEC: 294 MOSM/KG (ref 275–295)
PLATELET # BLD AUTO: 227 10(3)UL (ref 150–450)
POTASSIUM SERPL-SCNC: 3.4 MMOL/L (ref 3.5–5.1)
PROGEST SERPL-MCNC: 4.71 NG/ML
PROT SERPL-MCNC: 7.3 G/DL (ref 6.4–8.2)
RBC # BLD AUTO: 4.29 X10(6)UL
SODIUM SERPL-SCNC: 143 MMOL/L (ref 136–145)
TRICHOMONAS SCREEN: NEGATIVE
TRIGL SERPL-MCNC: 92 MG/DL (ref 30–149)
TSI SER-ACNC: 0.58 MIU/ML (ref 0.36–3.74)
VIT B12 SERPL-MCNC: 218 PG/ML (ref 193–986)
VIT D+METAB SERPL-MCNC: 10.9 NG/ML (ref 30–100)
VLDLC SERPL CALC-MCNC: 13 MG/DL (ref 0–30)
WBC # BLD AUTO: 4.3 X10(3) UL (ref 4–11)

## 2023-08-25 PROCEDURE — 83002 ASSAY OF GONADOTROPIN (LH): CPT

## 2023-08-25 PROCEDURE — 84144 ASSAY OF PROGESTERONE: CPT

## 2023-08-25 PROCEDURE — 85027 COMPLETE CBC AUTOMATED: CPT

## 2023-08-25 PROCEDURE — 83525 ASSAY OF INSULIN: CPT

## 2023-08-25 PROCEDURE — 82607 VITAMIN B-12: CPT

## 2023-08-25 PROCEDURE — 80053 COMPREHEN METABOLIC PANEL: CPT

## 2023-08-25 PROCEDURE — 72110 X-RAY EXAM L-2 SPINE 4/>VWS: CPT | Performed by: NURSE PRACTITIONER

## 2023-08-25 PROCEDURE — 84443 ASSAY THYROID STIM HORMONE: CPT

## 2023-08-25 PROCEDURE — 82306 VITAMIN D 25 HYDROXY: CPT

## 2023-08-25 PROCEDURE — 80061 LIPID PANEL: CPT

## 2023-08-25 PROCEDURE — 83036 HEMOGLOBIN GLYCOSYLATED A1C: CPT

## 2023-08-25 PROCEDURE — 83001 ASSAY OF GONADOTROPIN (FSH): CPT

## 2023-08-25 PROCEDURE — 36415 COLL VENOUS BLD VENIPUNCTURE: CPT

## 2023-08-29 ENCOUNTER — TELEPHONE (OUTPATIENT)
Facility: CLINIC | Age: 23
End: 2023-08-29

## 2023-08-29 RX ORDER — METRONIDAZOLE 500 MG/1
500 TABLET ORAL 2 TIMES DAILY
Qty: 14 TABLET | Refills: 0 | Status: SHIPPED | OUTPATIENT
Start: 2023-08-29 | End: 2023-09-05

## 2023-08-30 ENCOUNTER — HOSPITAL ENCOUNTER (OUTPATIENT)
Age: 23
Discharge: HOME | End: 2023-08-30
Payer: COMMERCIAL

## 2023-08-30 ENCOUNTER — APPOINTMENT (OUTPATIENT)
Dept: CT IMAGING | Facility: HOSPITAL | Age: 23
End: 2023-08-30
Attending: NURSE PRACTITIONER
Payer: COMMERCIAL

## 2023-08-30 VITALS
DIASTOLIC BLOOD PRESSURE: 62 MMHG | OXYGEN SATURATION: 100 % | SYSTOLIC BLOOD PRESSURE: 118 MMHG | HEART RATE: 78 BPM | RESPIRATION RATE: 18 BRPM | TEMPERATURE: 97 F

## 2023-08-30 DIAGNOSIS — R55 SYNCOPE AND COLLAPSE: Primary | ICD-10-CM

## 2023-08-30 LAB
#MXD IC: 0.2 X10ˆ3/UL (ref 0.1–1)
ATRIAL RATE: 85 BPM
B-HCG UR QL: NEGATIVE
BASOPHILS # BLD AUTO: 0.02 X10(3) UL (ref 0–0.2)
BASOPHILS NFR BLD AUTO: 0.4 %
BILIRUB UR QL STRIP: NEGATIVE
BUN BLD-MCNC: 6 MG/DL (ref 7–18)
CHLORIDE BLD-SCNC: 103 MMOL/L (ref 98–112)
CO2 BLD-SCNC: 27 MMOL/L (ref 21–32)
CREAT BLD-MCNC: 0.6 MG/DL
DDIMER WHOLE BLOOD: <200 NG/ML DDU (ref ?–400)
EGFRCR SERPLBLD CKD-EPI 2021: 130 ML/MIN/1.73M2 (ref 60–?)
EOSINOPHIL # BLD AUTO: 0.04 X10(3) UL (ref 0–0.7)
EOSINOPHIL NFR BLD AUTO: 0.8 %
ERYTHROCYTE [DISTWIDTH] IN BLOOD BY AUTOMATED COUNT: 14.1 %
GLUCOSE BLD-MCNC: 83 MG/DL (ref 70–99)
GLUCOSE UR STRIP-MCNC: NEGATIVE MG/DL
HCT VFR BLD AUTO: 35.7 %
HCT VFR BLD AUTO: 36.1 %
HCT VFR BLD CALC: 36 %
HGB BLD-MCNC: 11.6 G/DL
HGB BLD-MCNC: 11.9 G/DL
HGB UR QL STRIP: NEGATIVE
IMM GRANULOCYTES # BLD AUTO: 0.01 X10(3) UL (ref 0–1)
IMM GRANULOCYTES NFR BLD: 0.2 %
ISTAT IONIZED CALCIUM FOR CHEM 8: 1.17 MMOL/L (ref 1.12–1.32)
KETONES UR STRIP-MCNC: NEGATIVE MG/DL
LYMPHOCYTES # BLD AUTO: 0.99 X10(3) UL (ref 1–4)
LYMPHOCYTES # BLD AUTO: 1.1 X10ˆ3/UL (ref 1–4)
LYMPHOCYTES NFR BLD AUTO: 19 %
LYMPHOCYTES NFR BLD AUTO: 20.2 %
MCH RBC QN AUTO: 26.5 PG (ref 26–34)
MCH RBC QN AUTO: 26.9 PG (ref 26–34)
MCHC RBC AUTO-ENTMCNC: 32.1 G/DL (ref 31–37)
MCHC RBC AUTO-ENTMCNC: 33.3 G/DL (ref 31–37)
MCV RBC AUTO: 80.6 FL
MCV RBC AUTO: 82.6 FL (ref 80–100)
MIXED CELL %: 4.2 %
MONOCYTES # BLD AUTO: 0.42 X10(3) UL (ref 0.1–1)
MONOCYTES NFR BLD AUTO: 8.1 %
NEUTROPHILS # BLD AUTO: 3.72 X10 (3) UL (ref 1.5–7.7)
NEUTROPHILS # BLD AUTO: 3.72 X10(3) UL (ref 1.5–7.7)
NEUTROPHILS # BLD AUTO: 4.1 X10ˆ3/UL (ref 1.5–7.7)
NEUTROPHILS NFR BLD AUTO: 71.5 %
NEUTROPHILS NFR BLD AUTO: 75.6 %
NITRITE UR QL STRIP: NEGATIVE
P AXIS: 72 DEGREES
P-R INTERVAL: 148 MS
PH UR STRIP: 6.5 [PH]
PLATELET # BLD AUTO: 202 10(3)UL (ref 150–450)
POTASSIUM BLD-SCNC: 3.3 MMOL/L (ref 3.6–5.1)
PROT UR STRIP-MCNC: 30 MG/DL
Q-T INTERVAL: 364 MS
QRS DURATION: 88 MS
QTC CALCULATION (BEZET): 433 MS
R AXIS: 18 DEGREES
RBC # BLD AUTO: 4.37 X10ˆ6/UL
RBC # BLD AUTO: 4.43 X10(6)UL
SARS-COV-2 RNA RESP QL NAA+PROBE: NOT DETECTED
SODIUM BLD-SCNC: 141 MMOL/L (ref 136–145)
SP GR UR STRIP: 1.01
T AXIS: 28 DEGREES
TROPONIN I BLD-MCNC: <0.02 NG/ML
UROBILINOGEN UR STRIP-ACNC: <2 MG/DL
VENTRICULAR RATE: 85 BPM
WBC # BLD AUTO: 5.2 X10(3) UL (ref 4–11)
WBC # BLD AUTO: 5.4 X10ˆ3/UL (ref 4–11)

## 2023-08-30 PROCEDURE — 70450 CT HEAD/BRAIN W/O DYE: CPT | Performed by: NURSE PRACTITIONER

## 2023-08-30 PROCEDURE — 96360 HYDRATION IV INFUSION INIT: CPT | Performed by: NURSE PRACTITIONER

## 2023-08-30 PROCEDURE — 84484 ASSAY OF TROPONIN QUANT: CPT | Performed by: NURSE PRACTITIONER

## 2023-08-30 PROCEDURE — 80047 BASIC METABLC PNL IONIZED CA: CPT | Performed by: NURSE PRACTITIONER

## 2023-08-30 PROCEDURE — 99213 OFFICE O/P EST LOW 20 MIN: CPT | Performed by: NURSE PRACTITIONER

## 2023-08-30 PROCEDURE — 93000 ELECTROCARDIOGRAM COMPLETE: CPT | Performed by: NURSE PRACTITIONER

## 2023-08-30 PROCEDURE — U0002 COVID-19 LAB TEST NON-CDC: HCPCS | Performed by: NURSE PRACTITIONER

## 2023-08-30 PROCEDURE — 81002 URINALYSIS NONAUTO W/O SCOPE: CPT | Performed by: NURSE PRACTITIONER

## 2023-08-30 PROCEDURE — 85378 FIBRIN DEGRADE SEMIQUANT: CPT | Performed by: NURSE PRACTITIONER

## 2023-08-30 PROCEDURE — 81025 URINE PREGNANCY TEST: CPT | Performed by: NURSE PRACTITIONER

## 2023-08-30 PROCEDURE — 85025 COMPLETE CBC W/AUTO DIFF WBC: CPT | Performed by: NURSE PRACTITIONER

## 2023-08-30 RX ORDER — SODIUM CHLORIDE 9 MG/ML
1000 INJECTION, SOLUTION INTRAVENOUS ONCE
Status: COMPLETED | OUTPATIENT
Start: 2023-08-30 | End: 2023-08-30

## 2023-08-31 DIAGNOSIS — D64.9 NORMOCYTIC ANEMIA: ICD-10-CM

## 2023-08-31 DIAGNOSIS — G89.29 CHRONIC LEFT-SIDED LOW BACK PAIN WITH LEFT-SIDED SCIATICA: Primary | ICD-10-CM

## 2023-08-31 DIAGNOSIS — E87.6 HYPOKALEMIA: ICD-10-CM

## 2023-08-31 DIAGNOSIS — M54.42 CHRONIC LEFT-SIDED LOW BACK PAIN WITH LEFT-SIDED SCIATICA: Primary | ICD-10-CM

## 2023-08-31 DIAGNOSIS — E55.9 VITAMIN D DEFICIENCY: Primary | ICD-10-CM

## 2023-08-31 RX ORDER — POTASSIUM CHLORIDE 750 MG/1
10 TABLET, FILM COATED, EXTENDED RELEASE ORAL DAILY
Qty: 30 TABLET | Refills: 0 | Status: SHIPPED | OUTPATIENT
Start: 2023-08-31

## 2023-08-31 RX ORDER — CHOLECALCIFEROL (VITAMIN D3) 1250 MCG
50000 CAPSULE ORAL WEEKLY
Qty: 12 CAPSULE | Refills: 3 | Status: SHIPPED | OUTPATIENT
Start: 2023-08-31 | End: 2023-09-30

## 2023-08-31 RX ORDER — FERROUS SULFATE 325(65) MG
325 TABLET ORAL
Qty: 90 TABLET | Refills: 1 | Status: SHIPPED | OUTPATIENT
Start: 2023-08-31

## 2023-09-05 ENCOUNTER — OFFICE VISIT (OUTPATIENT)
Dept: FAMILY MEDICINE CLINIC | Facility: CLINIC | Age: 23
End: 2023-09-05

## 2023-09-05 VITALS
HEART RATE: 92 BPM | RESPIRATION RATE: 18 BRPM | OXYGEN SATURATION: 97 % | DIASTOLIC BLOOD PRESSURE: 65 MMHG | SYSTOLIC BLOOD PRESSURE: 115 MMHG | TEMPERATURE: 98 F

## 2023-09-05 DIAGNOSIS — D64.9 LOW HEMOGLOBIN: ICD-10-CM

## 2023-09-05 DIAGNOSIS — E87.6 LOW BLOOD POTASSIUM: ICD-10-CM

## 2023-09-05 DIAGNOSIS — R94.31 ABNORMAL EKG: ICD-10-CM

## 2023-09-05 DIAGNOSIS — R55 VASOVAGAL SYNCOPE: Primary | ICD-10-CM

## 2023-09-05 PROBLEM — M62.89 PELVIC FLOOR TENSION: Status: ACTIVE | Noted: 2023-08-14

## 2023-09-05 PROCEDURE — 3078F DIAST BP <80 MM HG: CPT

## 2023-09-05 PROCEDURE — 99213 OFFICE O/P EST LOW 20 MIN: CPT

## 2023-09-05 PROCEDURE — 3074F SYST BP LT 130 MM HG: CPT

## 2023-09-05 RX ORDER — HYDROXYZINE HYDROCHLORIDE 25 MG/1
TABLET, FILM COATED ORAL 3 TIMES DAILY PRN
COMMUNITY
Start: 2023-08-08

## 2023-09-05 RX ORDER — VALACYCLOVIR HYDROCHLORIDE 500 MG/1
TABLET, FILM COATED ORAL
COMMUNITY
Start: 2023-08-16

## 2023-09-15 ENCOUNTER — HOSPITAL ENCOUNTER (EMERGENCY)
Facility: HOSPITAL | Age: 23
Discharge: LEFT BEFORE DISCHARGE | End: 2023-09-15
Payer: COMMERCIAL

## 2023-09-15 VITALS
SYSTOLIC BLOOD PRESSURE: 114 MMHG | DIASTOLIC BLOOD PRESSURE: 76 MMHG | HEART RATE: 90 BPM | OXYGEN SATURATION: 98 % | RESPIRATION RATE: 18 BRPM | TEMPERATURE: 97 F

## 2023-10-10 ENCOUNTER — LAB ENCOUNTER (OUTPATIENT)
Dept: LAB | Age: 23
End: 2023-10-10
Payer: COMMERCIAL

## 2023-10-10 ENCOUNTER — OFFICE VISIT (OUTPATIENT)
Dept: FAMILY MEDICINE CLINIC | Facility: CLINIC | Age: 23
End: 2023-10-10

## 2023-10-10 VITALS
DIASTOLIC BLOOD PRESSURE: 65 MMHG | HEART RATE: 81 BPM | WEIGHT: 187 LBS | BODY MASS INDEX: 34.41 KG/M2 | HEIGHT: 62 IN | SYSTOLIC BLOOD PRESSURE: 96 MMHG

## 2023-10-10 DIAGNOSIS — D64.9 LOW HEMOGLOBIN: ICD-10-CM

## 2023-10-10 DIAGNOSIS — D64.9 NORMOCYTIC ANEMIA: ICD-10-CM

## 2023-10-10 DIAGNOSIS — M25.552 LEFT HIP PAIN: ICD-10-CM

## 2023-10-10 DIAGNOSIS — E87.6 LOW BLOOD POTASSIUM: ICD-10-CM

## 2023-10-10 DIAGNOSIS — M79.672 LEFT FOOT PAIN: ICD-10-CM

## 2023-10-10 DIAGNOSIS — R55 VASOVAGAL SYNCOPE: ICD-10-CM

## 2023-10-10 DIAGNOSIS — M54.42 CHRONIC LEFT-SIDED LOW BACK PAIN WITH LEFT-SIDED SCIATICA: Primary | ICD-10-CM

## 2023-10-10 DIAGNOSIS — E87.6 HYPOKALEMIA: ICD-10-CM

## 2023-10-10 DIAGNOSIS — G89.29 CHRONIC LEFT-SIDED LOW BACK PAIN WITH LEFT-SIDED SCIATICA: Primary | ICD-10-CM

## 2023-10-10 LAB
ALBUMIN SERPL-MCNC: 3.5 G/DL (ref 3.4–5)
ALBUMIN/GLOB SERPL: 0.9 {RATIO} (ref 1–2)
ALP LIVER SERPL-CCNC: 86 U/L
ALT SERPL-CCNC: 11 U/L
ANION GAP SERPL CALC-SCNC: 5 MMOL/L (ref 0–18)
AST SERPL-CCNC: 9 U/L (ref 15–37)
BASOPHILS # BLD AUTO: 0.02 X10(3) UL (ref 0–0.2)
BASOPHILS NFR BLD AUTO: 0.5 %
BILIRUB SERPL-MCNC: 0.8 MG/DL (ref 0.1–2)
BUN BLD-MCNC: 6 MG/DL (ref 7–18)
BUN/CREAT SERPL: 9.4 (ref 10–20)
CALCIUM BLD-MCNC: 8.6 MG/DL (ref 8.5–10.1)
CHLORIDE SERPL-SCNC: 109 MMOL/L (ref 98–112)
CO2 SERPL-SCNC: 29 MMOL/L (ref 21–32)
CREAT BLD-MCNC: 0.64 MG/DL
DEPRECATED HBV CORE AB SER IA-ACNC: 14.8 NG/ML
DEPRECATED RDW RBC AUTO: 44.2 FL (ref 35.1–46.3)
EGFRCR SERPLBLD CKD-EPI 2021: 128 ML/MIN/1.73M2 (ref 60–?)
EOSINOPHIL # BLD AUTO: 0.03 X10(3) UL (ref 0–0.7)
EOSINOPHIL NFR BLD AUTO: 0.7 %
ERYTHROCYTE [DISTWIDTH] IN BLOOD BY AUTOMATED COUNT: 14.1 % (ref 11–15)
FASTING STATUS PATIENT QL REPORTED: NO
GLOBULIN PLAS-MCNC: 4 G/DL (ref 2.8–4.4)
GLUCOSE BLD-MCNC: 100 MG/DL (ref 70–99)
HCT VFR BLD AUTO: 36.6 %
HGB BLD-MCNC: 11.5 G/DL
IMM GRANULOCYTES # BLD AUTO: 0 X10(3) UL (ref 0–1)
IMM GRANULOCYTES NFR BLD: 0 %
IRON SATN MFR SERPL: 32 %
IRON SERPL-MCNC: 136 UG/DL
LYMPHOCYTES # BLD AUTO: 1.38 X10(3) UL (ref 1–4)
LYMPHOCYTES NFR BLD AUTO: 34.1 %
MCH RBC QN AUTO: 26.7 PG (ref 26–34)
MCHC RBC AUTO-ENTMCNC: 31.4 G/DL (ref 31–37)
MCV RBC AUTO: 85.1 FL
MONOCYTES # BLD AUTO: 0.31 X10(3) UL (ref 0.1–1)
MONOCYTES NFR BLD AUTO: 7.7 %
NEUTROPHILS # BLD AUTO: 2.31 X10 (3) UL (ref 1.5–7.7)
NEUTROPHILS # BLD AUTO: 2.31 X10(3) UL (ref 1.5–7.7)
NEUTROPHILS NFR BLD AUTO: 57 %
OSMOLALITY SERPL CALC.SUM OF ELEC: 294 MOSM/KG (ref 275–295)
PLATELET # BLD AUTO: 250 10(3)UL (ref 150–450)
POTASSIUM SERPL-SCNC: 3.9 MMOL/L (ref 3.5–5.1)
PROT SERPL-MCNC: 7.5 G/DL (ref 6.4–8.2)
RBC # BLD AUTO: 4.3 X10(6)UL
SODIUM SERPL-SCNC: 143 MMOL/L (ref 136–145)
TIBC SERPL-MCNC: 426 UG/DL (ref 240–450)
TRANSFERRIN SERPL-MCNC: 286 MG/DL (ref 200–360)
WBC # BLD AUTO: 4.1 X10(3) UL (ref 4–11)

## 2023-10-10 PROCEDURE — 3078F DIAST BP <80 MM HG: CPT | Performed by: NURSE PRACTITIONER

## 2023-10-10 PROCEDURE — 3074F SYST BP LT 130 MM HG: CPT | Performed by: NURSE PRACTITIONER

## 2023-10-10 PROCEDURE — 80053 COMPREHEN METABOLIC PANEL: CPT

## 2023-10-10 PROCEDURE — 84466 ASSAY OF TRANSFERRIN: CPT

## 2023-10-10 PROCEDURE — 85025 COMPLETE CBC W/AUTO DIFF WBC: CPT

## 2023-10-10 PROCEDURE — 36415 COLL VENOUS BLD VENIPUNCTURE: CPT

## 2023-10-10 PROCEDURE — 3008F BODY MASS INDEX DOCD: CPT | Performed by: NURSE PRACTITIONER

## 2023-10-10 PROCEDURE — 99214 OFFICE O/P EST MOD 30 MIN: CPT | Performed by: NURSE PRACTITIONER

## 2023-10-10 PROCEDURE — 83540 ASSAY OF IRON: CPT

## 2023-10-10 PROCEDURE — 82728 ASSAY OF FERRITIN: CPT

## 2023-10-10 RX ORDER — NAPROXEN 500 MG/1
500 TABLET ORAL 2 TIMES DAILY PRN
Qty: 60 TABLET | Refills: 0 | Status: SHIPPED | OUTPATIENT
Start: 2023-10-10

## 2023-10-10 NOTE — ASSESSMENT & PLAN NOTE
Refer physiatry and pt  Naproxen 500 mg I po twice a day as needed w food  Ice 20 min 4-6 times per day  Do not use heat on injury  Do not apply ice directly on skin, make certain a thin cloth is between skin and ice pack

## 2023-10-16 DIAGNOSIS — D50.9 IRON DEFICIENCY ANEMIA, UNSPECIFIED IRON DEFICIENCY ANEMIA TYPE: Primary | ICD-10-CM

## 2023-10-16 RX ORDER — FERROUS SULFATE 325(65) MG
325 TABLET ORAL
Qty: 30 TABLET | Refills: 1 | Status: SHIPPED | OUTPATIENT
Start: 2023-10-16

## 2023-10-30 ENCOUNTER — MED REC SCAN ONLY (OUTPATIENT)
Dept: FAMILY MEDICINE CLINIC | Facility: CLINIC | Age: 23
End: 2023-10-30

## 2023-11-03 ENCOUNTER — OFFICE VISIT (OUTPATIENT)
Dept: FAMILY MEDICINE CLINIC | Facility: CLINIC | Age: 23
End: 2023-11-03

## 2023-11-03 ENCOUNTER — TELEPHONE (OUTPATIENT)
Dept: INTERNAL MEDICINE CLINIC | Facility: CLINIC | Age: 23
End: 2023-11-03

## 2023-11-03 VITALS
BODY MASS INDEX: 34.78 KG/M2 | HEIGHT: 62 IN | SYSTOLIC BLOOD PRESSURE: 128 MMHG | WEIGHT: 189 LBS | DIASTOLIC BLOOD PRESSURE: 72 MMHG | HEART RATE: 97 BPM

## 2023-11-03 DIAGNOSIS — B35.4 TINEA CORPORIS: Primary | ICD-10-CM

## 2023-11-03 DIAGNOSIS — R55 SYNCOPE AND COLLAPSE: ICD-10-CM

## 2023-11-03 PROCEDURE — 3008F BODY MASS INDEX DOCD: CPT | Performed by: NURSE PRACTITIONER

## 2023-11-03 PROCEDURE — 3074F SYST BP LT 130 MM HG: CPT | Performed by: NURSE PRACTITIONER

## 2023-11-03 PROCEDURE — 99213 OFFICE O/P EST LOW 20 MIN: CPT | Performed by: NURSE PRACTITIONER

## 2023-11-03 PROCEDURE — 3078F DIAST BP <80 MM HG: CPT | Performed by: NURSE PRACTITIONER

## 2023-11-03 RX ORDER — KETOCONAZOLE 20 MG/G
CREAM TOPICAL
Qty: 60 G | Refills: 1 | Status: SHIPPED | OUTPATIENT
Start: 2023-11-03

## 2023-11-06 ENCOUNTER — APPOINTMENT (OUTPATIENT)
Dept: CT IMAGING | Facility: HOSPITAL | Age: 23
End: 2023-11-06
Attending: EMERGENCY MEDICINE
Payer: COMMERCIAL

## 2023-11-06 ENCOUNTER — APPOINTMENT (OUTPATIENT)
Dept: GENERAL RADIOLOGY | Facility: HOSPITAL | Age: 23
End: 2023-11-06
Attending: EMERGENCY MEDICINE
Payer: COMMERCIAL

## 2023-11-06 ENCOUNTER — HOSPITAL ENCOUNTER (OUTPATIENT)
Age: 23
Discharge: ANOTHER HOSPITAL | End: 2023-11-06
Payer: COMMERCIAL

## 2023-11-06 ENCOUNTER — HOSPITAL ENCOUNTER (EMERGENCY)
Facility: HOSPITAL | Age: 23
Discharge: HOME | End: 2023-11-06
Attending: EMERGENCY MEDICINE
Payer: COMMERCIAL

## 2023-11-06 VITALS
TEMPERATURE: 98 F | OXYGEN SATURATION: 100 % | RESPIRATION RATE: 18 BRPM | HEART RATE: 61 BPM | DIASTOLIC BLOOD PRESSURE: 58 MMHG | SYSTOLIC BLOOD PRESSURE: 107 MMHG

## 2023-11-06 VITALS
OXYGEN SATURATION: 99 % | SYSTOLIC BLOOD PRESSURE: 96 MMHG | HEART RATE: 78 BPM | DIASTOLIC BLOOD PRESSURE: 55 MMHG | BODY MASS INDEX: 34.96 KG/M2 | TEMPERATURE: 98 F | WEIGHT: 190 LBS | RESPIRATION RATE: 16 BRPM | HEIGHT: 62 IN

## 2023-11-06 DIAGNOSIS — R55 RECURRENT SYNCOPE: Primary | ICD-10-CM

## 2023-11-06 DIAGNOSIS — R07.81 RIB PAIN: ICD-10-CM

## 2023-11-06 DIAGNOSIS — S20.212A RIB CONTUSION, LEFT, INITIAL ENCOUNTER: ICD-10-CM

## 2023-11-06 DIAGNOSIS — S09.90XA INJURY OF HEAD, INITIAL ENCOUNTER: ICD-10-CM

## 2023-11-06 DIAGNOSIS — R55 SYNCOPE AND COLLAPSE: Primary | ICD-10-CM

## 2023-11-06 DIAGNOSIS — S09.8XXA BLUNT HEAD INJURY, INITIAL ENCOUNTER: ICD-10-CM

## 2023-11-06 LAB
ALBUMIN SERPL-MCNC: 3.5 G/DL (ref 3.4–5)
ALBUMIN/GLOB SERPL: 0.9 {RATIO} (ref 1–2)
ALP LIVER SERPL-CCNC: 89 U/L
ALT SERPL-CCNC: 10 U/L
ANION GAP SERPL CALC-SCNC: 6 MMOL/L (ref 0–18)
AST SERPL-CCNC: 13 U/L (ref 15–37)
ATRIAL RATE: 69 BPM
B-HCG UR QL: NEGATIVE
BASOPHILS # BLD AUTO: 0.02 X10(3) UL (ref 0–0.2)
BASOPHILS NFR BLD AUTO: 0.5 %
BILIRUB SERPL-MCNC: 0.8 MG/DL (ref 0.1–2)
BUN BLD-MCNC: 6 MG/DL (ref 9–23)
CALCIUM BLD-MCNC: 8.7 MG/DL (ref 8.5–10.1)
CHLORIDE SERPL-SCNC: 107 MMOL/L (ref 98–112)
CO2 SERPL-SCNC: 27 MMOL/L (ref 21–32)
CREAT BLD-MCNC: 0.69 MG/DL
EGFRCR SERPLBLD CKD-EPI 2021: 125 ML/MIN/1.73M2 (ref 60–?)
EOSINOPHIL # BLD AUTO: 0.03 X10(3) UL (ref 0–0.7)
EOSINOPHIL NFR BLD AUTO: 0.7 %
ERYTHROCYTE [DISTWIDTH] IN BLOOD BY AUTOMATED COUNT: 14.4 %
GLOBULIN PLAS-MCNC: 4 G/DL (ref 2.8–4.4)
GLUCOSE BLD-MCNC: 108 MG/DL (ref 70–99)
HCT VFR BLD AUTO: 35.4 %
HGB BLD-MCNC: 11.5 G/DL
IMM GRANULOCYTES # BLD AUTO: 0 X10(3) UL (ref 0–1)
IMM GRANULOCYTES NFR BLD: 0 %
LYMPHOCYTES # BLD AUTO: 1.61 X10(3) UL (ref 1–4)
LYMPHOCYTES NFR BLD AUTO: 38.3 %
MCH RBC QN AUTO: 26.9 PG (ref 26–34)
MCHC RBC AUTO-ENTMCNC: 32.5 G/DL (ref 31–37)
MCV RBC AUTO: 82.7 FL
MONOCYTES # BLD AUTO: 0.16 X10(3) UL (ref 0.1–1)
MONOCYTES NFR BLD AUTO: 3.8 %
NEUTROPHILS # BLD AUTO: 2.38 X10 (3) UL (ref 1.5–7.7)
NEUTROPHILS # BLD AUTO: 2.38 X10(3) UL (ref 1.5–7.7)
NEUTROPHILS NFR BLD AUTO: 56.7 %
OSMOLALITY SERPL CALC.SUM OF ELEC: 288 MOSM/KG (ref 275–295)
P AXIS: 37 DEGREES
P-R INTERVAL: 146 MS
PLATELET # BLD AUTO: 216 10(3)UL (ref 150–450)
POTASSIUM SERPL-SCNC: 3.5 MMOL/L (ref 3.5–5.1)
PROT SERPL-MCNC: 7.5 G/DL (ref 6.4–8.2)
Q-T INTERVAL: 376 MS
QRS DURATION: 88 MS
QTC CALCULATION (BEZET): 402 MS
R AXIS: 19 DEGREES
RBC # BLD AUTO: 4.28 X10(6)UL
SODIUM SERPL-SCNC: 140 MMOL/L (ref 136–145)
T AXIS: 25 DEGREES
TROPONIN I SERPL HS-MCNC: 3 NG/L
VENTRICULAR RATE: 69 BPM
WBC # BLD AUTO: 4.2 X10(3) UL (ref 4–11)

## 2023-11-06 PROCEDURE — 80053 COMPREHEN METABOLIC PANEL: CPT | Performed by: EMERGENCY MEDICINE

## 2023-11-06 PROCEDURE — 85025 COMPLETE CBC W/AUTO DIFF WBC: CPT | Performed by: EMERGENCY MEDICINE

## 2023-11-06 PROCEDURE — 93005 ELECTROCARDIOGRAM TRACING: CPT

## 2023-11-06 PROCEDURE — 93010 ELECTROCARDIOGRAM REPORT: CPT

## 2023-11-06 PROCEDURE — 36415 COLL VENOUS BLD VENIPUNCTURE: CPT

## 2023-11-06 PROCEDURE — 81025 URINE PREGNANCY TEST: CPT

## 2023-11-06 PROCEDURE — 70450 CT HEAD/BRAIN W/O DYE: CPT | Performed by: EMERGENCY MEDICINE

## 2023-11-06 PROCEDURE — 71101 X-RAY EXAM UNILAT RIBS/CHEST: CPT | Performed by: EMERGENCY MEDICINE

## 2023-11-06 PROCEDURE — 84484 ASSAY OF TROPONIN QUANT: CPT | Performed by: EMERGENCY MEDICINE

## 2023-11-06 PROCEDURE — 99285 EMERGENCY DEPT VISIT HI MDM: CPT

## 2023-11-06 PROCEDURE — 99215 OFFICE O/P EST HI 40 MIN: CPT | Performed by: NURSE PRACTITIONER

## 2023-11-06 NOTE — DISCHARGE INSTRUCTIONS
Please go directly to the [facility] emergency room for further evaluation and advanced imaging that was not available here at the [facility] immediate care

## 2023-11-06 NOTE — ED INITIAL ASSESSMENT (HPI)
Pt c/o awakening on the floor. Pt states she passed out and hit her head on the bathroom counter. Pt denies neck pain. Pt also c/o pain to her left abdomen.

## 2023-11-06 NOTE — ED INITIAL ASSESSMENT (HPI)
Pt reports she was getting ready for work and passed out in bedroom. Woke up longterm under her vanity and sure she hit her head. C/o left sided abdominal pain from hitting the stool on her vanity. Pt has hx of multiple syncopal episodes and just got holter monitor off and awaiting results.

## 2023-11-22 ENCOUNTER — HOSPITAL ENCOUNTER (OUTPATIENT)
Age: 23
Discharge: HOME | End: 2023-11-22
Payer: COMMERCIAL

## 2023-11-22 VITALS
RESPIRATION RATE: 18 BRPM | TEMPERATURE: 98 F | OXYGEN SATURATION: 100 % | DIASTOLIC BLOOD PRESSURE: 71 MMHG | HEART RATE: 104 BPM | SYSTOLIC BLOOD PRESSURE: 116 MMHG

## 2023-11-22 DIAGNOSIS — L25.9 CONTACT DERMATITIS, UNSPECIFIED CONTACT DERMATITIS TYPE, UNSPECIFIED TRIGGER: ICD-10-CM

## 2023-11-22 DIAGNOSIS — J02.9 VIRAL PHARYNGITIS: Primary | ICD-10-CM

## 2023-11-22 LAB — S PYO AG THROAT QL: NEGATIVE

## 2023-11-22 PROCEDURE — 87880 STREP A ASSAY W/OPTIC: CPT | Performed by: NURSE PRACTITIONER

## 2023-11-22 PROCEDURE — 99213 OFFICE O/P EST LOW 20 MIN: CPT | Performed by: NURSE PRACTITIONER

## 2023-11-22 RX ORDER — TRIAMCINOLONE ACETONIDE 1 MG/G
CREAM TOPICAL 2 TIMES DAILY
Qty: 45 G | Refills: 0 | Status: SHIPPED | OUTPATIENT
Start: 2023-11-22 | End: 2023-12-02

## 2023-11-22 NOTE — ED INITIAL ASSESSMENT (HPI)
Patient is here with complaints of a sore throat that started last night. She also has red bumps on her back that she noticed last week.

## 2023-11-29 ENCOUNTER — OFFICE VISIT (OUTPATIENT)
Dept: PHYSICAL MEDICINE AND REHAB | Facility: CLINIC | Age: 23
End: 2023-11-29
Payer: COMMERCIAL

## 2023-11-29 VITALS — WEIGHT: 190 LBS | BODY MASS INDEX: 34.96 KG/M2 | HEIGHT: 62 IN

## 2023-11-29 DIAGNOSIS — F43.23 ADJUSTMENT DISORDER WITH MIXED ANXIETY AND DEPRESSED MOOD: ICD-10-CM

## 2023-11-29 DIAGNOSIS — M13.0 POLYARTHRITIS: ICD-10-CM

## 2023-11-29 DIAGNOSIS — M54.42 CHRONIC LEFT-SIDED LOW BACK PAIN WITH LEFT-SIDED SCIATICA: Primary | ICD-10-CM

## 2023-11-29 DIAGNOSIS — G89.29 CHRONIC LEFT-SIDED LOW BACK PAIN WITH LEFT-SIDED SCIATICA: Primary | ICD-10-CM

## 2023-11-29 DIAGNOSIS — E66.9 OBESITY (BMI 30-39.9): ICD-10-CM

## 2023-11-29 DIAGNOSIS — M22.2X2 PATELLOFEMORAL ARTHRALGIA OF BOTH KNEES: ICD-10-CM

## 2023-11-29 DIAGNOSIS — M22.2X1 PATELLOFEMORAL ARTHRALGIA OF BOTH KNEES: ICD-10-CM

## 2023-11-29 DIAGNOSIS — M76.892 ENTHESOPATHY OF LEFT HIP REGION: ICD-10-CM

## 2023-11-29 RX ORDER — DOXEPIN HYDROCHLORIDE 10 MG/1
CAPSULE ORAL
Qty: 60 CAPSULE | Refills: 0 | Status: SHIPPED | OUTPATIENT
Start: 2023-11-29

## 2023-12-01 ENCOUNTER — LAB ENCOUNTER (OUTPATIENT)
Dept: LAB | Facility: HOSPITAL | Age: 23
End: 2023-12-01
Attending: PHYSICAL MEDICINE & REHABILITATION
Payer: COMMERCIAL

## 2023-12-01 DIAGNOSIS — M13.0 POLYARTHRITIS: Primary | ICD-10-CM

## 2023-12-01 LAB
ERYTHROCYTE [SEDIMENTATION RATE] IN BLOOD: 24 MM/HR
RHEUMATOID FACT SERPL-ACNC: 12 IU/ML (ref ?–14)
TSI SER-ACNC: 0.87 MIU/ML (ref 0.55–4.78)
VIT D+METAB SERPL-MCNC: 10.3 NG/ML (ref 30–100)

## 2023-12-01 PROCEDURE — 86200 CCP ANTIBODY: CPT | Performed by: PHYSICAL MEDICINE & REHABILITATION

## 2023-12-01 PROCEDURE — 36415 COLL VENOUS BLD VENIPUNCTURE: CPT

## 2023-12-01 PROCEDURE — 86431 RHEUMATOID FACTOR QUANT: CPT

## 2023-12-01 PROCEDURE — 85652 RBC SED RATE AUTOMATED: CPT

## 2023-12-01 PROCEDURE — 86225 DNA ANTIBODY NATIVE: CPT

## 2023-12-01 PROCEDURE — 82306 VITAMIN D 25 HYDROXY: CPT

## 2023-12-01 PROCEDURE — 84443 ASSAY THYROID STIM HORMONE: CPT

## 2023-12-01 PROCEDURE — 86038 ANTINUCLEAR ANTIBODIES: CPT

## 2023-12-04 LAB
CCP IGG SERPL-ACNC: 0.9 U/ML (ref 0–6.9)
DSDNA IGG SERPL IA-ACNC: <0.6 IU/ML
ENA AB SER QL IA: 0.1 UG/L
ENA AB SER QL IA: NEGATIVE

## 2023-12-06 ENCOUNTER — TELEPHONE (OUTPATIENT)
Dept: PHYSICAL MEDICINE AND REHAB | Facility: CLINIC | Age: 23
End: 2023-12-06

## 2023-12-06 NOTE — TELEPHONE ENCOUNTER
----- Message from Harjit Villar MD sent at 12/5/2023  1:53 PM CST -----  Please let the patient know that she has a low vitamin D level. There is also a mildly elevated sedimentation rate which is nonspecific,  especially since all the other tests are normal.  I recommend she talk to her primary doctor about vitamin D supplementation as it can cause soft tissue pain.

## 2023-12-19 ENCOUNTER — HOSPITAL ENCOUNTER (OUTPATIENT)
Age: 23
Discharge: HOME | End: 2023-12-19
Payer: COMMERCIAL

## 2023-12-29 ENCOUNTER — OFFICE VISIT (OUTPATIENT)
Dept: PHYSICAL MEDICINE AND REHAB | Facility: CLINIC | Age: 23
End: 2023-12-29
Payer: COMMERCIAL

## 2023-12-29 VITALS — WEIGHT: 190 LBS | BODY MASS INDEX: 34.96 KG/M2 | HEIGHT: 62 IN

## 2023-12-29 DIAGNOSIS — E66.9 OBESITY (BMI 30-39.9): ICD-10-CM

## 2023-12-29 DIAGNOSIS — M13.0 POLYARTHRITIS: ICD-10-CM

## 2023-12-29 DIAGNOSIS — M76.892 ENTHESOPATHY OF LEFT HIP REGION: ICD-10-CM

## 2023-12-29 DIAGNOSIS — F43.23 ADJUSTMENT DISORDER WITH MIXED ANXIETY AND DEPRESSED MOOD: ICD-10-CM

## 2023-12-29 DIAGNOSIS — G89.29 CHRONIC LEFT-SIDED LOW BACK PAIN WITH LEFT-SIDED SCIATICA: Primary | ICD-10-CM

## 2023-12-29 DIAGNOSIS — M54.42 CHRONIC LEFT-SIDED LOW BACK PAIN WITH LEFT-SIDED SCIATICA: Primary | ICD-10-CM

## 2023-12-29 PROCEDURE — 99213 OFFICE O/P EST LOW 20 MIN: CPT | Performed by: PHYSICAL MEDICINE & REHABILITATION

## 2023-12-29 PROCEDURE — 3008F BODY MASS INDEX DOCD: CPT | Performed by: PHYSICAL MEDICINE & REHABILITATION

## 2023-12-31 ENCOUNTER — OFFICE VISIT (OUTPATIENT)
Dept: FAMILY MEDICINE CLINIC | Facility: CLINIC | Age: 23
End: 2023-12-31
Payer: COMMERCIAL

## 2023-12-31 VITALS
HEART RATE: 96 BPM | RESPIRATION RATE: 18 BRPM | DIASTOLIC BLOOD PRESSURE: 57 MMHG | WEIGHT: 190 LBS | BODY MASS INDEX: 34.96 KG/M2 | OXYGEN SATURATION: 100 % | TEMPERATURE: 98 F | HEIGHT: 62 IN | SYSTOLIC BLOOD PRESSURE: 127 MMHG

## 2023-12-31 DIAGNOSIS — T30.0 BURN: Primary | ICD-10-CM

## 2023-12-31 PROCEDURE — 3008F BODY MASS INDEX DOCD: CPT | Performed by: NURSE PRACTITIONER

## 2023-12-31 PROCEDURE — 3074F SYST BP LT 130 MM HG: CPT | Performed by: NURSE PRACTITIONER

## 2023-12-31 PROCEDURE — 99213 OFFICE O/P EST LOW 20 MIN: CPT | Performed by: NURSE PRACTITIONER

## 2023-12-31 PROCEDURE — 3078F DIAST BP <80 MM HG: CPT | Performed by: NURSE PRACTITIONER

## 2023-12-31 NOTE — PATIENT INSTRUCTIONS
See attached patient care instructions. Wash with soap and water daily  Run area under cool liquid. May use aloe vera burn gel on area. If blistering, do not pop blisters  If blisters open may apply bacitirian to area. Monitor for signs and symptom of infection.

## 2024-01-02 ENCOUNTER — TELEMEDICINE (OUTPATIENT)
Dept: FAMILY MEDICINE CLINIC | Facility: CLINIC | Age: 24
End: 2024-01-02
Payer: COMMERCIAL

## 2024-01-02 DIAGNOSIS — R55 SYNCOPE AND COLLAPSE: Primary | ICD-10-CM

## 2024-01-02 DIAGNOSIS — T21.22XD PARTIAL THICKNESS BURN OF ABDOMINAL WALL, SUBSEQUENT ENCOUNTER: ICD-10-CM

## 2024-01-02 PROBLEM — T21.02XA BURN OF ABDOMEN WALL: Status: ACTIVE | Noted: 2024-01-02

## 2024-01-02 PROCEDURE — 99214 OFFICE O/P EST MOD 30 MIN: CPT | Performed by: NURSE PRACTITIONER

## 2024-01-02 NOTE — ASSESSMENT & PLAN NOTE
Reviewed notes from cardio,neuro along w test results  Advise follow up cardio, neuro  Has been seeing physiatry and rheum.

## 2024-01-08 ENCOUNTER — OFFICE VISIT (OUTPATIENT)
Dept: SURGERY | Facility: CLINIC | Age: 24
End: 2024-01-08
Payer: COMMERCIAL

## 2024-01-08 VITALS
OXYGEN SATURATION: 99 % | HEART RATE: 110 BPM | HEIGHT: 63.8 IN | WEIGHT: 194 LBS | DIASTOLIC BLOOD PRESSURE: 70 MMHG | BODY MASS INDEX: 33.53 KG/M2 | SYSTOLIC BLOOD PRESSURE: 110 MMHG

## 2024-01-08 DIAGNOSIS — K59.00 CONSTIPATION, UNSPECIFIED CONSTIPATION TYPE: ICD-10-CM

## 2024-01-08 DIAGNOSIS — M54.9 CHRONIC BACK PAIN, UNSPECIFIED BACK LOCATION, UNSPECIFIED BACK PAIN LATERALITY: ICD-10-CM

## 2024-01-08 DIAGNOSIS — G89.29 CHRONIC BACK PAIN, UNSPECIFIED BACK LOCATION, UNSPECIFIED BACK PAIN LATERALITY: ICD-10-CM

## 2024-01-08 DIAGNOSIS — R63.2 INCREASED APPETITE: ICD-10-CM

## 2024-01-08 DIAGNOSIS — E66.9 OBESITY (BMI 30-39.9): ICD-10-CM

## 2024-01-08 DIAGNOSIS — Z51.81 ENCOUNTER FOR THERAPEUTIC DRUG MONITORING: Primary | ICD-10-CM

## 2024-01-08 PROCEDURE — 3078F DIAST BP <80 MM HG: CPT | Performed by: NURSE PRACTITIONER

## 2024-01-08 PROCEDURE — 3074F SYST BP LT 130 MM HG: CPT | Performed by: NURSE PRACTITIONER

## 2024-01-08 PROCEDURE — 3008F BODY MASS INDEX DOCD: CPT | Performed by: NURSE PRACTITIONER

## 2024-01-08 PROCEDURE — 99213 OFFICE O/P EST LOW 20 MIN: CPT | Performed by: NURSE PRACTITIONER

## 2024-01-08 RX ORDER — TIRZEPATIDE 2.5 MG/.5ML
2.5 INJECTION, SOLUTION SUBCUTANEOUS WEEKLY
Qty: 2 ML | Refills: 1 | Status: SHIPPED | OUTPATIENT
Start: 2024-01-08

## 2024-01-08 NOTE — PROGRESS NOTES
[facility], [facility], [place]  [address] [facility] [address] [place] [id number]  Dept: [phone]       Patient:  Kaylie Vance  :      11/3/2000  MRN:      [id number]    Chief Complaint:    Chief Complaint   Patient presents with    Follow - Up    Weight Management    Obesity       SUBJECTIVE     History of Present Illness:  Kaylie is being seen today for a follow-up for weight management.     Stopped phentermine September due to death in the family.   Resumed 2023. Has experienced weight gain.     Initial HPI:  21 yo female.   Presents to clinic for assistance with weight loss/maintenance.   Took phentermine 2020-2020. Online clinic.  Lost 45 lbs. Regained weight.   Now pescetarian 3 months.   Hx recurrent miscarriages. In pelvic floor therapy.    On sertraline.     Patient is considering medications and is not a candidate for bariatric surgery for weight loss.  Patient denies any history of eating disorder(s).    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 Event planning business.   Patient lives with self.    Patient's goal weight:  Biggest weight loss in the past:  45 lbs; 10 lbs   How weight loss was achieved: 45 lbs phentermine, diet & exercise changes, 10 lbs diet & exercise changes   Heaviest weight ever:  Previous use of medical weight loss medications:    Physical activity: Boxing 3 days/week     Sleep: inadequate hours/night    Sleep screening:     Past Medical History:   Past Medical History:   Diagnosis Date    Anxiety     Depression     Fibromyalgia     Lupus (HCC) 2019        Comorbidities:      OBJECTIVE     Vitals: /70 (BP Location: Right arm, Patient Position: Sitting, Cuff Size: adult)   Pulse 110   Ht 5' 3.8\" (1.621 m)   Wt 194 lb (88 kg)   LMP 2023   SpO2 99%   BMI 33.51 kg/m²     Initial weight loss: +07   Total weight loss:  +08   Start weight: 186    Wt  Patient with normal annual physical exam today.  He has no acute concerns about his health today.  We will follow up in 1 year with annual physical exam and labs.   Readings from Last 6 Encounters:   01/08/24 194 lb (88 kg)   12/31/23 190 lb (86.2 kg)   12/29/23 190 lb (86.2 kg)   11/29/23 190 lb (86.2 kg)   11/06/23 190 lb (86.2 kg)   11/06/23 (P) 190 lb (86.2 kg)       Patient Medications:    Current Outpatient Medications   Medication Sig Dispense Refill    Tirzepatide-Weight Management (ZEPBOUND) 2.5 MG/0.5ML Subcutaneous Solution Auto-injector Inject 2.5 mg into the skin once a week. 2 mL 1    doxepin 10 MG Oral Cap 1-2 tablet orally every night as directed 60 capsule 0    ketoconazole 2 % External Cream Apply to affected area bid-continue to use for 5 days after all evidence of rash or redness has resolved 60 g 1    Ferrous Sulfate 325 (65 Fe) MG Oral Tab Take 1 tablet (325 mg total) by mouth daily with breakfast. 30 tablet 1    naproxen 500 MG Oral Tab Take 1 tablet (500 mg total) by mouth 2 (two) times daily as needed. Take after a full meal (Patient not taking: Reported on 12/29/2023) 60 tablet 0    valACYclovir 500 MG Oral Tab       Potassium Chloride ER 10 MEQ Oral Tab CR Take 1 tablet (10 mEq total) by mouth daily. 30 tablet 0    Cholecalciferol 50 MCG (2000 UT) Oral Tab Take 1 tablet (2,000 Units total) by mouth As Directed.      Cyanocobalamin 2500 MCG Oral Tab Take by mouth As Directed.      folic acid 400 MCG Oral Tab Take 1 tablet (400 mcg total) by mouth As Directed.       Allergies:  Amoxicillin, Penicillins, Sulfa antibiotics, Tomato, Wandy protect, Dimethicone-zinc oxide, and Zinc oxide     Social History:  Reviewed    Surgical History:  History reviewed. No pertinent surgical history.  Family History:    Family History   Problem Relation Age of Onset    Heart Attack Father     Anxiety Mother     Cancer Maternal Grandmother         breast and kidney cancer    Diabetes Maternal Grandmother     Heart Disease Maternal Grandfather     Diabetes Paternal Grandmother     Heart Attack Paternal Grandfather      Initial Intake:  After dinner behavior: + ice cream  daily   Night eating: -  Portion sizes: -  Binge: -  Emotional: stress   Depression: + Score: 5 on sertraline   Grazing: -  Sweet tooth: +  Crunchy/salty: +  Etoh: Never   Soda Drinker: Yes                 If yes, how much?:  sprite occasional   Sports Drinks:  Yes               If yes, how much?:  Gatorade   Juice:  Yes                 If yes, how much?:  strawberry lemonade      Number of restaurant or fast food meals/week:  Rare meals/week, too expensive      Food Journal  Reviewed and Discussed:       Patient has a Food Journal?: no   Patient is reading nutrition labels?  yes  Average Caloric Intake:     Average CHO Intake:   Is patient exercising? yes  Type of exercise? 2 days/week   Boxing     Eating Habits  Patient states the following:  Eats 3 meal(s) per day  Length of time it takes to consume a meal:    # of snacks per day:    Type of snacks:    Amount of soda consumption per day:  None   Amount of water (in ounces) per day:  Improved   Toughest challenge:  death of grandpa    3 meals/day  B: smoothie (mixed berries, banana, kale), granola bar   L: tuna/cod/shrimp, italian dressing   D: fish, rice, vegetables      Nutritional Goals  Eat 3-4 cups of fresh fruits or vegetables daily    Behavior Modifications Reviewed and Discussed  Eat breakfast, Eat 3 meals per day, Plan meals in advance, Read nutrition labels, Drink 64 oz of water per day, Maintain a daily food journal, Utlize portion control strategies to reduce calorie intake, Identify triggers for eating and manage cues, and Eat slowly and take 20 to 30 minutes to complete each meal    Exercise Goals Reviewed and Discussed    Aim for 150 minutes moderate level exercise weekly with 2-3 days strength training as tolerated     ROS:    Constitutional: negative  Respiratory: negative  Cardiovascular: negative  Gastrointestinal: positive for constipation  Integument/breast: negative  Hematologic/lymphatic: negative  Musculoskeletal:positive for back pain  scoliosis   Neurological: negative  Behavioral/Psych: positive for depression  Endocrine: negative  All other systems were reviewed and are negative    Physical Exam:  General appearance: alert, appears stated age, cooperative and obese  Head: Normocephalic, without obvious abnormality, atraumatic  Neck: no adenopathy, no carotid bruit, no JVD, supple, symmetrical, trachea midline and thyroid not enlarged, symmetric, no tenderness/mass/nodules  Lungs: clear to auscultation bilaterally  Heart: S1, S2 normal, no murmur, click, rub or gallop, regular rate and rhythm  Abdomen: soft, non-tender; bowel sounds normal; no masses,  no organomegaly and abdomen obese   Extremities: intact, no edema   Pulses: 2+ and symmetric  Skin: intact   Neurologic: Grossly normal    ASSESSMENT       Encounter Diagnosis(ses):   Encounter Diagnoses   Name Primary?    Encounter for therapeutic drug monitoring Yes    Increased appetite     Constipation, unspecified constipation type     Chronic back pain, unspecified back location, unspecified back pain laterality     Obesity (BMI 30-39.9)        PLAN         Diagnoses and all orders for this visit:    Encounter for therapeutic drug monitoring    Increased appetite    Constipation, unspecified constipation type    Chronic back pain, unspecified back location, unspecified back pain laterality    Obesity (BMI 30-39.9)  -     Tirzepatide-Weight Management (ZEPBOUND) 2.5 MG/0.5ML Subcutaneous Solution Auto-injector; Inject 2.5 mg into the skin once a week.      OBESITY/WEIGHT GAIN:     Recommended patient continue intensive lifestyle and behavioral modifications at this time for weight loss.     Educated patient on lifestyle modifications: Whole Food/Plant Strong/Low Glycemic Index diet, moderate alcohol consumption, reduced sodium intake to no more than 2,400 mg/day, and at least 150 minutes of moderate physical activity per week.    Avoid processed, poor quality carbohydrates, refined grains,  flour, sugar.     Goals for next month:  1. Keep a food log.   2. Drink 64 ounces of non-caloric beverages per day. No fruit juices or regular soda.  3. Aim for 150 minutes moderate exercise per week.    4. Increase fruit and vegetable servings to 5-6 per day.    5. Improve sleep and stress.      Reviewed labs:   9/12/22- CBC in range. Elevated . CMP otherwise in range.   3/29/23- Thyroid studies in range. Low hemoglobin.  10/10/23- . Hemoglobin low. CMP otherwise ok.   11/6/2023- FBS elevated. BMP otherwise in range. Low hemoglobin- supplement.   12/1/23- Vitamin D low 10.3- continue weekly supplement.      Denies hx cardiac disease or substance abuse.  Denies hx renal stones.     Denies personal or family hx medullary thyroid CA, endocrine neoplasia syndrome, pancreatitis hx, suicidal ideation. No renal impairment, severe GI disease, diabetes, pancreatitis risks noted.    Has taken metformin in the past.     Weight gain on phentermine 15 mg by mouth in the AM.    Switch to/start Zepbound 2.5 mg weekly. Increase dose monthly as tolerated.    Alternative: phentermine + topiramate. Consider additional metformin or berberine.     SQ administration teaching provided to patient.   Discussed risks, benefits, and side effects of medication. Patient instructed to avoid pregnancy during use.     EKG done 05/2023.     Constipation resolved.     Colonoscopy rescheduled.      Completed pelvic floor therapy- recurrent miscarriage.     Meet with RD.     Goal: 150 lbs.      RTC 3-4 months.      REJI Chandler

## 2024-01-09 ENCOUNTER — TELEPHONE (OUTPATIENT)
Dept: SURGERY | Facility: CLINIC | Age: 24
End: 2024-01-09

## 2024-01-16 ENCOUNTER — TELEPHONE (OUTPATIENT)
Dept: SURGERY | Facility: CLINIC | Age: 24
End: 2024-01-16

## 2024-01-18 DIAGNOSIS — E66.9 OBESITY (BMI 30-39.9): Primary | ICD-10-CM

## 2024-01-18 RX ORDER — PHENTERMINE HYDROCHLORIDE 37.5 MG/1
37.5 TABLET ORAL
Qty: 30 TABLET | Refills: 0 | Status: SHIPPED | OUTPATIENT
Start: 2024-01-18

## 2024-01-18 RX ORDER — TOPIRAMATE 25 MG/1
25 TABLET ORAL DAILY
Qty: 30 TABLET | Refills: 1 | Status: SHIPPED | OUTPATIENT
Start: 2024-01-18

## 2024-01-26 ENCOUNTER — TELEPHONE (OUTPATIENT)
Dept: GASTROENTEROLOGY | Facility: CLINIC | Age: 24
End: 2024-01-26

## 2024-01-26 NOTE — TELEPHONE ENCOUNTER
Patient states that she does not think that she will be able to get the colonoscopy prep down, so she wants to know if she is able to get a prescription for prep that is in pill form?

## 2024-01-26 NOTE — TELEPHONE ENCOUNTER
Called patient - states she does not want to do liquid prep and is requesting Sutab prep.    Dr. Shafer -    Patient is scheduled for a colonoscopy and EGD on 2/5/2024 - she is requesting Sutab. Can you update orders to be sent to her pharmacy?    Thank you!    Sisi

## 2024-01-27 RX ORDER — SOD SULF/POT CHLORIDE/MAG SULF 1.479 G
1 TABLET ORAL AS DIRECTED
Qty: 12 TABLET | Refills: 0 | Status: SHIPPED | OUTPATIENT
Start: 2024-01-27 | End: 2024-01-30

## 2024-01-30 RX ORDER — SOD SULF/POT CHLORIDE/MAG SULF 1.479 G
24 TABLET ORAL AS DIRECTED
Qty: 24 TABLET | Refills: 0 | Status: SHIPPED | OUTPATIENT
Start: 2024-01-30 | End: 2024-01-31

## 2024-01-31 ENCOUNTER — TELEPHONE (OUTPATIENT)
Facility: CLINIC | Age: 24
End: 2024-01-31

## 2024-01-31 NOTE — TELEPHONE ENCOUNTER
Called patient, name/ verified.    Informed pt that we sent sutab to her pharmacy on file, -requested sutab to be sent to [facility] in [place] which I did.     Instructed pt to use 's coupon for sutab.     Updated prep instructions sent in [facility]  and informed pt.     Pt to call if having questions.

## 2024-01-31 NOTE — TELEPHONE ENCOUNTER
Current Outpatient Medications   Medication Sig Dispense Refill    Sodium Sulfate-Mag Sulfate-KCl (SUTAB) [phone] MG Oral Tab Take 24 tablets by mouth As Directed for 1 day. Take as directed by GI office 24 tablet 0       Med is not covered.

## 2024-01-31 NOTE — TELEPHONE ENCOUNTER
Sutab not covered by patients insurance, confirmed with pharmacy. Left message to call back to inform patient of script not being covered.

## 2024-02-01 NOTE — TELEPHONE ENCOUNTER
Called patient, name/ verified.    Stated she will  sutab tomorrow, reminded pt to use 's coupon from [email or web address].com website, she said she will.     Additionally, stated she already read updated prep instructions in [employer].    Pt to call GI office if having issues picking up sutab, informed GI office hours, she  scheduled for colonoscopy/ egd on Monday, 24.     Pt verbalized understanding.

## 2024-02-02 ENCOUNTER — TELEPHONE (OUTPATIENT)
Facility: CLINIC | Age: 24
End: 2024-02-02

## 2024-02-02 PROBLEM — R40.4 ALTERED LEVEL OF CONSCIOUSNESS: Status: ACTIVE | Noted: 2023-09-26

## 2024-02-02 PROBLEM — F41.9 ANXIETY: Status: ACTIVE | Noted: 2023-09-26

## 2024-02-02 NOTE — TELEPHONE ENCOUNTER
Called pt x2, left detailed message in voicemail regarding using 's savings coupon from [email or web address] website, informed GI office will  open 'til 5pm today.     Scheduled egd/colonoscopy 2/5/24.

## 2024-02-02 NOTE — TELEPHONE ENCOUNTER
[facility] pharmacy states Sutab Bowel Prep rx for 2/5/2024 CLN & EGD is not covered by insurance.  Please call.  Thank you.

## 2024-02-02 NOTE — TELEPHONE ENCOUNTER
Called back patient, name/ verified.    Discussed obtaining 's coupon for sutab to use when she picks up this med, stated she is aware and will use it, has no further questions.     Called [facility] pharmacy on file, left message in voicemail informing them that pt will use 's coupon in picking up Sutab.       <<----- Click to add NO pertinent Past Medical History No pertinent past medical history

## 2024-02-05 ENCOUNTER — TELEPHONE (OUTPATIENT)
Dept: GASTROENTEROLOGY | Facility: CLINIC | Age: 24
End: 2024-02-05

## 2024-02-05 PROCEDURE — 88312 SPECIAL STAINS GROUP 1: CPT | Performed by: INTERNAL MEDICINE

## 2024-02-05 PROCEDURE — 88342 IMHCHEM/IMCYTCHM 1ST ANTB: CPT | Performed by: INTERNAL MEDICINE

## 2024-02-05 PROCEDURE — 88360 TUMOR IMMUNOHISTOCHEM/MANUAL: CPT | Performed by: INTERNAL MEDICINE

## 2024-02-05 PROCEDURE — 88341 IMHCHEM/IMCYTCHM EA ADD ANTB: CPT | Performed by: INTERNAL MEDICINE

## 2024-02-05 PROCEDURE — 88305 TISSUE EXAM BY PATHOLOGIST: CPT | Performed by: INTERNAL MEDICINE

## 2024-02-05 RX ORDER — OMEPRAZOLE 40 MG/1
40 CAPSULE, DELAYED RELEASE ORAL EVERY 12 HOURS
Qty: 180 CAPSULE | Refills: 3 | Status: SHIPPED | OUTPATIENT
Start: 2024-02-05

## 2024-02-05 NOTE — TELEPHONE ENCOUNTER
Called patient, name/ verified.    Discussed with patient f/u appointment per Dr. Shafer below.    Offered available appointment, informed of possible wait time d/t overbook.    Patient confirmed and accepted appointment.    Patient verbalized understanding.    Your appointments       2024  3:00 PM CST Virtual Phone Visit with Zaira Shafer MD [facility] ([facility])    You have been scheduled for a Virtual Telephone visit with your provider.  Please be available at your phone so that your physician can contact you, and be prepared with any questions or concerns.  You may be billed a copay at a later time, depending upon your insurance.  As always, your health is our priority.

## 2024-02-05 NOTE — TELEPHONE ENCOUNTER
GI staff - please call the patient to set up virtual follow up with me to discuss pathology results and next steps on Thursday, 2/8 at 3 PM.  Please let her know there may be a delay since it's an overbook.  Thanks, SS

## 2024-02-06 ENCOUNTER — HOSPITAL ENCOUNTER (OUTPATIENT)
Dept: ELECTROPHYSIOLOGY | Facility: HOSPITAL | Age: 24
Discharge: HOME | End: 2024-02-06
Attending: NURSE PRACTITIONER
Payer: COMMERCIAL

## 2024-02-06 ENCOUNTER — TELEPHONE (OUTPATIENT)
Facility: CLINIC | Age: 24
End: 2024-02-06

## 2024-02-06 DIAGNOSIS — R55 SYNCOPE AND COLLAPSE: ICD-10-CM

## 2024-02-06 PROCEDURE — 95816 EEG AWAKE AND DROWSY: CPT | Performed by: OTHER

## 2024-02-06 NOTE — TELEPHONE ENCOUNTER
Current Outpatient Medications   Medication Sig Dispense Refill    Omeprazole 40 MG Oral Capsule Delayed Release Take 1 capsule (40 mg total) by mouth every 12 (twelve) hours. 180 capsule 3       Med not covered. Call plan to initiate PA.

## 2024-02-06 NOTE — PROCEDURES
EEG report    REFERRING PHYSICIAN: Tabitha Hernandez APRN    PCP and phone number:  Tabitha Siddiqui MD  [phone]    TECHNIQUE: 21 channels of EEG, 2 channels of EOG, and 1 channel of EKG were recorded utilizing the International 10/20 System. The recording was performed in a digitized monopolar referential format and playback was reformatted into various referential and bipolar montages utilizing appropriate filter settings. Automatic seizure and spike detection programs were utilized throughout the recording.  Video was not recorded during the study    CLINICAL DATA:  Patient is sent for the evaluation of possible seizures.    MEDICATION:  Continuous Medications:      Scheduled Medications:    Current Outpatient Medications:     Omeprazole 40 MG Oral Capsule Delayed Release, Take 1 capsule (40 mg total) by mouth every 12 (twelve) hours., Disp: 180 capsule, Rfl: 3    topiramate 25 MG Oral Tab, Take 1 tablet (25 mg total) by mouth daily., Disp: 30 tablet, Rfl: 1    Phentermine HCl 37.5 MG Oral Tab, Take 1 tablet (37.5 mg total) by mouth every morning before breakfast., Disp: 30 tablet, Rfl: 0    doxepin 10 MG Oral Cap, 1-2 tablet orally every night as directed, Disp: 60 capsule, Rfl: 0    ketoconazole 2 % External Cream, Apply to affected area bid-continue to use for 5 days after all evidence of rash or redness has resolved, Disp: 60 g, Rfl: 1    Ferrous Sulfate 325 (65 Fe) MG Oral Tab, Take 1 tablet (325 mg total) by mouth daily with breakfast., Disp: 30 tablet, Rfl: 1    valACYclovir 500 MG Oral Tab, , Disp: , Rfl:     Potassium Chloride ER 10 MEQ Oral Tab CR, Take 1 tablet (10 mEq total) by mouth daily., Disp: 30 tablet, Rfl: 0    Cholecalciferol 50 MCG (2000 UT) Oral Tab, Take 1 tablet (2,000 Units total) by mouth As Directed., Disp: , Rfl:     Cyanocobalamin 2500 MCG Oral Tab, Take by mouth As Directed., Disp: , Rfl:     folic acid 400 MCG Oral Tab, Take 1 tablet (400 mcg total) by mouth As Directed.,  Disp: , Rfl:     PRN Medications:      ACTIVATION:  Hyperventilation: Not done  Photic stimulation: Not done  Sleep: Normal sleep architecture was seen.    BACKGROUND  While the patient was awake, the posterior dominant rhythm consisted of well-regulated 9-10 Hz rhythmic waveforms, symmetrically distributed over both posterior quadrants and was reactive to eye opening.    EEG ABNORMALITY  None    IMPRESSION:  This is a normal EEG. No focal, lateralized, or epileptiform features are noted. Clinical correlation required.

## 2024-02-07 ENCOUNTER — TELEPHONE (OUTPATIENT)
Dept: GASTROENTEROLOGY | Facility: CLINIC | Age: 24
End: 2024-02-07

## 2024-02-08 ENCOUNTER — VIRTUAL PHONE E/M (OUTPATIENT)
Dept: GASTROENTEROLOGY | Facility: CLINIC | Age: 24
End: 2024-02-08

## 2024-02-08 ENCOUNTER — TELEPHONE (OUTPATIENT)
Dept: GASTROENTEROLOGY | Facility: CLINIC | Age: 24
End: 2024-02-08

## 2024-02-08 DIAGNOSIS — T39.395A NSAID-INDUCED GASTRIC ULCER: ICD-10-CM

## 2024-02-08 DIAGNOSIS — C7A.8 PRIMARY MALIGNANT NEUROENDOCRINE TUMOR OF RECTUM (HCC): Primary | ICD-10-CM

## 2024-02-08 DIAGNOSIS — D12.7: Primary | ICD-10-CM

## 2024-02-08 DIAGNOSIS — K25.9 GASTRIC ULCER, UNSPECIFIED CHRONICITY, UNSPECIFIED WHETHER GASTRIC ULCER HEMORRHAGE OR PERFORATION PRESENT: Primary | ICD-10-CM

## 2024-02-08 DIAGNOSIS — M79.7 FIBROMYALGIA: ICD-10-CM

## 2024-02-08 DIAGNOSIS — K25.9 NSAID-INDUCED GASTRIC ULCER: ICD-10-CM

## 2024-02-08 PROCEDURE — 99443 PHONE E/M BY PHYS 21-30 MIN: CPT | Performed by: INTERNAL MEDICINE

## 2024-02-08 NOTE — TELEPHONE ENCOUNTER
Called patient, name/ verified.    Informed pt that her Omeprazole needs PA and we are working on it.    Pt verbalized understanding.

## 2024-02-08 NOTE — TELEPHONE ENCOUNTER
PA for Omeprazole approved.    PA # [id number] SS    Approved from 2/8/24-2/7/27    Message sent in [employer] notifying pt of PA approval as above.

## 2024-02-08 NOTE — PROGRESS NOTES
[facility] - Gastroenterology                                                                                                      Clinic Follow-up Visit    Chief Complaint   Patient presents with    Follow - Up     PHONE VISIT    Subjective/HPI:     23 F here for the following:    Pt feels well after the EGD and colonoscopy.  Discussed pathology results in detail. She has multiple joint pains chronically but has stopped taking NSAIDs since she was diagnosed with a stomach ulcer earlier this week.     EGD/CLN 2/2024  Impression:  Moderate to severe gastritis with antral erosions and multiple small, clean based gastric ulcers. Biopsied.   Mild-moderate duodenitis. Biopsied.   One small sigmoid colon polyp, resected and retrieved.  One 5 mm rectosigmoid submucosal nodule located 20 cm from the anal verge. Biopsied. Tattoo'd.   Small internal hemorrhoids.       Recommend:  Await pathology.   Follow up with Dr. Shafer this Thursday at 3 PM (virtual follow up) to discuss next steps (repeat EGD in 8-12 weeks and likely rectal EUS).  The GI staff will call you to set this up.    Avoid all NSAIDs.  Recommend starting omeprazole 40 mg twice a day.   High fiber diet.  Monitor for blood in the stool. If having more than just tinge of blood, call office or go to the ER.  Avoid NSAIDs - pt states she has used this regularly for years.   Final Diagnosis:      A. Duodenum biopsies:   Multiple fragments of small bowel mucosa with focal mucosal erosion..  Normal villous architecture present.  No evidence of celiac sprue, granuloma, dysplasia, or malignancy is identified.  Diff Quik stain (with appropriate control reactivity) is negative for H pylori microorganisms.     B. Random gastric biopsies:  Fragments of gastric mucosa with mild inactive chronic inflammation.  Diff Quik stain (with appropriate control reactivity) is negative for H  pylori microorganisms.     C. Gastric ulcer biopsy:   Fragments of gastric mucosa with mild inactive chronic inflammation, focal mucosal erosion, and glandular regenerative changes.  Separate fragments of necrotic tissue with severe acute inflammation, consistent with base of ulcer.  No evidence of dysplasia or malignancy is identified.  Diff Quik stain (with appropriate control reactivity) is negative for H pylori microorganisms.     D. Sigmoid colon polyp; polypectomy:  Hyperplastic polyp.     E. Rectosigmoid colon nodule; biopsy:  Multiple fragments of colonic mucosa with well-differentiated neuroendocrine tumor, grade 1, (4 mm in greatest dimension), involving lamina propria and muscularis mucosa.     Comment:  Sections of the rectosigmoid colon nodule biopsy demonstrate fragments of colonic mucosa with nests and cords of epithelioid small blue tumor cells within the lamina propria and involving muscularis mucosa.     Immunohistochemical stains demonstrate that these epithelial cells are strongly positive for cytokeratin 8/18, synaptophysin, chromogranin (focally), and CD56.   Ki-67 proliferation index is 1% in the tumor cells.     These findings are supporting the above diagnosis.     For  purposes, this case was reviewed by a second pathologist who concurred with the diagnosis.  Dr. Shafer has been notified with the above findings on 2-6-2024 at 5.30 p.m.         Wt Readings from Last 6 Encounters:   02/02/24 183 lb (83 kg)   01/08/24 194 lb (88 kg)   12/31/23 190 lb (86.2 kg)   12/29/23 190 lb (86.2 kg)   11/29/23 190 lb (86.2 kg)   11/06/23 190 lb (86.2 kg)        History, Medications, Allergies, ROS:      Past Medical History:   Diagnosis Date    Anxiety     Depression     Fibromyalgia     Lupus (HCC) 2019    Seizure disorder (HCC)       No past surgical history on file.   Family History   Problem Relation Age of Onset    Heart Attack Father     Anxiety Mother     Cancer Maternal Grandmother          breast and kidney cancer    Diabetes Maternal Grandmother     Heart Disease Maternal Grandfather     Diabetes Paternal Grandmother     Heart Attack Paternal Grandfather       Social History:   Social History     Socioeconomic History    Marital status: Single   Tobacco Use    Smoking status: Never    Smokeless tobacco: Never   Vaping Use    Vaping Use: Never used   Substance and Sexual Activity    Alcohol use: Never    Drug use: Never        Medications (Active prior to today's visit):  Current Outpatient Medications   Medication Sig Dispense Refill    Omeprazole 40 MG Oral Capsule Delayed Release Take 1 capsule (40 mg total) by mouth every 12 (twelve) hours. 180 capsule 3    topiramate 25 MG Oral Tab Take 1 tablet (25 mg total) by mouth daily. 30 tablet 1    Phentermine HCl 37.5 MG Oral Tab Take 1 tablet (37.5 mg total) by mouth every morning before breakfast. 30 tablet 0    doxepin 10 MG Oral Cap 1-2 tablet orally every night as directed 60 capsule 0    ketoconazole 2 % External Cream Apply to affected area bid-continue to use for 5 days after all evidence of rash or redness has resolved 60 g 1    Ferrous Sulfate 325 (65 Fe) MG Oral Tab Take 1 tablet (325 mg total) by mouth daily with breakfast. 30 tablet 1    valACYclovir 500 MG Oral Tab       Potassium Chloride ER 10 MEQ Oral Tab CR Take 1 tablet (10 mEq total) by mouth daily. 30 tablet 0    Cholecalciferol 50 MCG (2000 UT) Oral Tab Take 1 tablet (2,000 Units total) by mouth As Directed.      Cyanocobalamin 2500 MCG Oral Tab Take by mouth As Directed.      folic acid 400 MCG Oral Tab Take 1 tablet (400 mcg total) by mouth As Directed.         Allergies:  Allergies   Allergen Reactions    Amoxicillin HIVES    Penicillins DIARRHEA, HIVES, NAUSEA ONLY and RASH     Other reaction(s): GI Symptoms, Hives   Other reaction(s): GI Symptoms   Other reaction(s): Hives    Sulfa Antibiotics DIARRHEA, RASH, HIVES and UNKNOWN     Other reaction(s): Unknown    Tomato  HIVES, NAUSEA ONLY and NAUSEA AND VOMITING    Wandy Protect RASH    Dimethicone-Zinc Oxide RASH    Zinc Oxide DIARRHEA and RASH       ROS:   CONSTITUTIONAL:  negative for fevers, chills  EYES:  negative for change in vision  RESPIRATORY:  negative for  shortness of breath  CARDIOVASCULAR:  negative for  chest pain  GASTROINTESTINAL:  see HPI  GENITOURINARY:  negative for dysuria  INTEGUMENT/BREAST:  SKIN:  negative for  rash  ALLERGIC/IMMUNOLOGIC:  negative for hay fever  ENDOCRINE:  negative for cold intolerance and heat intolerance  MUSCULOSKELETAL:  negative for  joint stiffness and joint swelling  BEHAVIOR/PSYCH:  negative for depressed mood    PHYSICAL EXAM:   Last menstrual period 12/24/2023, unknown if currently breastfeeding.    Phone visit.     Labs/Imaging:     Patient's labs and imaging were reviewed and discussed with patient today.     .  ASSESSMENT/PLAN:     22 F with h/o fibromyalgia and positive AMANDA and juvenile RA (not requiring any meds) here for the following:    Rectosigmoid neuroendocrine tumor, grade 1 - measured ~5 mm and located 20 cm from the anal verge and noted on recent colonoscopy. The mucosa distal to it was tattoo'd with SPOT dye.  The lesion was biopsied but not resected and was quite firm. Recommend rectal endoscopic ultrasound (EUS) in ~1 month to ascertain the depth of the lesion, then possible resection with Dr. Birch from interventional GI. Though the possibility is small, we also discussed the possibility of surgery if endoscopic resection is not possible.  The risks were also discussed in detail, including but not limited to bleeding, perforation, infection, and/or adverse reaction to sedation. The patient is amenable with proceeding.      NSAID induced gastric ulcer and duodenal erosions - noted on EGD 2/2024. She was taking NSAIDs regularly and has since stopped all NSAIDs and will see rheumatology to discuss alternatives for her joint pains.  Recommend omeprazole 40 mg  BID and repeat EGD to ensure healing in 8-12 weeks.      Recommend   - rectal EUS with MAC for resection of the rectosigmoid NET in ~1 month with Dr. Birch   - EGD with MAC in 8 -12 weeks to ensure healing of the stomach ulcer   - avoid NSAIDs   - cont omeprazole 40 mg BID until the EGD   - follow up with Dr. Shafer after the rectal EUS and the EGD   - referral to rheumatology to discuss NSAID alternatives to manage her chronic pain    EGD consent: I have discussed the risks, benefits, and alternatives to upper endoscopy/enteroscopy with the patient/primary decision maker [who demonstrated understanding], including but not limited to the risks of bleeding, infection, pain, death, as well as the risks of anesthesia and perforation all leading to prolonged hospitalization, surgical intervention, or even death. I also specifically mentioned the miss rate of upper endoscopy of 5-10% in the best of all circumstances.  The patient has agreed to sign an informed consent and elected to proceed with procedure with possible intervention [i.e. polypectomy, stent placement, etc.] as indicated.    EUS CONSENT  I have discussed the risks, benefits, and alternatives to EUS with the patient/primary decision maker [who demonstrated understanding], including but not limited to the risks of bleeding, infection, pain, death, as well as the risks of anesthesia and perforation all leading to prolonged hospital stay, surgical intervention, or even death. All questions were answered to the patient’s satisfaction. The patient signed informed consent and elected to proceed with EUS with intervention [i.e. FNA, FNB, etc.] as indicated.     > 30 minute consultation/follow up today with >50% spent in face-to-face discussion with the patient/family as well as additional time spent in coordination of care/discussion with care team.       Orders This Visit:  No orders of the defined types were placed in this encounter.      Meds This  Visit:  Requested Prescriptions      No prescriptions requested or ordered in this encounter       Imaging & Referrals:  RHEUMATOLOGY - INTERNAL     2/8/2024     Zaira Shafer MD        This note may have been partially prepared using Dragon Medical voice recognition dictation software. As a result, errors may occur. When identified, these errors have been corrected. While every attempt is made to correct errors during dictation, discrepancies may still exist.

## 2024-02-08 NOTE — PATIENT INSTRUCTIONS
Instructions for the rectal EUS  1. Schedule rectal endoscopic ultrasound with MAC with Dr. Birch in ~1 month [Diagnosis: rectosigmoid neuroendocrine tumor]    2.  bowel prep from pharmacy (split dose golytely)    3. Continue all medications for procedure    4. Read all bowel prep instructions carefully    5. AVOID seeds, nuts, popcorn, raw fruits and vegetables (cooked is okay) for 3 days before procedure    6. You will need to go for COVID testing 72 hours before procedure. The testing team will call you a few days before your procedure to notify you where/when you can get COVID testing. If you do not go for COVID testing, the procedure cannot be performed.     7. If you start any NEW medication after your visit today, please notify us. Certain medications will need to be held before the procedure, or the procedure cannot be performed.             Instructions for the EGD  1. Schedule upper endoscopy (EGD) with MAC in 8-12 weeks [Diagnosis: gastric ulcer]    2. Avoid all NSAIDs and continue omeprazole 40 mg twice a day  until the scope.      3. You will need to go for COVID testing 72 hours before procedure. The testing team will call you a few days before your procedure to notify you where/when you can get COVID testing. If you do not go for COVID testing, the procedure cannot be performed.     4. If you start any NEW medication after your visit today, please notify us. Certain medications will need to be held before the procedure, or the procedure cannot be performed.

## 2024-02-08 NOTE — TELEPHONE ENCOUNTER
Medication PA Requested:    Omeprazole 40 MG Oral Capsule Delayed Release                                                      CoverMyMeds Used: Yes  Key: [id number]  Quantity: 180  Day Supply: 90  Sig:  Take 1 capsule (40 mg total) by mouth every 12 (twelve) hours.   DX Code: Moderate to severe gastritis with antral erosions and multiple small, clean based gastric ulcers                               CPT code (if applicable):   Case Number/Pending Ref#:             CMM submitted, LOV 4/25/23 and Op Repoet 2/5/24  Awaiting determination

## 2024-02-08 NOTE — TELEPHONE ENCOUNTER
PPD Team,    Please assist patient in obtaining prior authrorization for  Omeprazole.    Dx: Moderate to severe gastritis with antral erosions and multiple small, clean based gastric ulcers. Biopsied per EGD report, also GERD    Thank you.

## 2024-02-15 ENCOUNTER — TELEPHONE (OUTPATIENT)
Facility: CLINIC | Age: 24
End: 2024-02-15

## 2024-02-15 NOTE — TELEPHONE ENCOUNTER
3/18 or after is fine    Thanks    MD [facility] - Gastroenterology  2/15/2024  12:26 PM

## 2024-02-15 NOTE — TELEPHONE ENCOUNTER
Pt calling with complaints of Nausea and vomiting.  She is requesting to speak with RN.  Please call

## 2024-02-15 NOTE — TELEPHONE ENCOUNTER
Hi Dr. Shafer,     Spoke with patient, name/ verified.    Reported not able to keep food/fluids down for the last 72 hrs, emesis was clear liquids and blood.    Hematemesis happened this morning w/ an empty stomach at 0720 am and at noon today.     Has nausea, not eating well, feels weak and dizzy.     Urinating but urine is dark yellow.     Had egd/colonoscopy on 24.     Instructed pt to go to ER now for evaluation.    Pt verbalized understanding.

## 2024-02-16 ENCOUNTER — HOSPITAL ENCOUNTER (EMERGENCY)
Facility: HOSPITAL | Age: 24
Discharge: HOME | End: 2024-02-16
Attending: EMERGENCY MEDICINE
Payer: COMMERCIAL

## 2024-02-16 ENCOUNTER — OFFICE VISIT (OUTPATIENT)
Dept: FAMILY MEDICINE CLINIC | Facility: CLINIC | Age: 24
End: 2024-02-16

## 2024-02-16 ENCOUNTER — APPOINTMENT (OUTPATIENT)
Dept: CT IMAGING | Facility: HOSPITAL | Age: 24
End: 2024-02-16
Attending: EMERGENCY MEDICINE
Payer: COMMERCIAL

## 2024-02-16 ENCOUNTER — TELEPHONE (OUTPATIENT)
Dept: FAMILY MEDICINE CLINIC | Facility: CLINIC | Age: 24
End: 2024-02-16

## 2024-02-16 VITALS
DIASTOLIC BLOOD PRESSURE: 56 MMHG | BODY MASS INDEX: 32.78 KG/M2 | HEIGHT: 62.99 IN | HEART RATE: 55 BPM | OXYGEN SATURATION: 98 % | TEMPERATURE: 99 F | SYSTOLIC BLOOD PRESSURE: 100 MMHG | RESPIRATION RATE: 16 BRPM | WEIGHT: 185 LBS

## 2024-02-16 VITALS
HEIGHT: 62.9 IN | WEIGHT: 190.38 LBS | SYSTOLIC BLOOD PRESSURE: 95 MMHG | HEART RATE: 101 BPM | DIASTOLIC BLOOD PRESSURE: 62 MMHG | BODY MASS INDEX: 33.73 KG/M2

## 2024-02-16 DIAGNOSIS — R11.2 NAUSEA AND VOMITING, UNSPECIFIED VOMITING TYPE: Primary | ICD-10-CM

## 2024-02-16 DIAGNOSIS — D3A.8 NEUROENDOCRINE TUMOR: ICD-10-CM

## 2024-02-16 DIAGNOSIS — R10.9 ABDOMINAL PAIN OF UNKNOWN ETIOLOGY: ICD-10-CM

## 2024-02-16 LAB
ALBUMIN SERPL-MCNC: 4.4 G/DL (ref 3.2–4.8)
ALP LIVER SERPL-CCNC: 72 U/L
ALT SERPL-CCNC: <7 U/L
ANION GAP SERPL CALC-SCNC: 5 MMOL/L (ref 0–18)
AST SERPL-CCNC: 12 U/L (ref ?–34)
B-HCG UR QL: NEGATIVE
BASOPHILS # BLD AUTO: 0.02 X10(3) UL (ref 0–0.2)
BASOPHILS NFR BLD AUTO: 0.5 %
BILIRUB DIRECT SERPL-MCNC: 0.5 MG/DL (ref ?–0.3)
BILIRUB SERPL-MCNC: 1.3 MG/DL (ref 0.3–1.2)
BILIRUB UR QL: NEGATIVE
BUN BLD-MCNC: 5 MG/DL (ref 9–23)
BUN/CREAT SERPL: 7.9 (ref 10–20)
CALCIUM BLD-MCNC: 9.3 MG/DL (ref 8.7–10.4)
CHLORIDE SERPL-SCNC: 110 MMOL/L (ref 98–112)
CLARITY UR: CLEAR
CO2 SERPL-SCNC: 27 MMOL/L (ref 21–32)
COLOR UR: YELLOW
CREAT BLD-MCNC: 0.63 MG/DL
DEPRECATED RDW RBC AUTO: 43.7 FL (ref 35.1–46.3)
EGFRCR SERPLBLD CKD-EPI 2021: 128 ML/MIN/1.73M2 (ref 60–?)
EOSINOPHIL # BLD AUTO: 0.03 X10(3) UL (ref 0–0.7)
EOSINOPHIL NFR BLD AUTO: 0.8 %
ERYTHROCYTE [DISTWIDTH] IN BLOOD BY AUTOMATED COUNT: 14.3 % (ref 11–15)
GLUCOSE BLD-MCNC: 97 MG/DL (ref 70–99)
GLUCOSE UR-MCNC: NORMAL MG/DL
HCT VFR BLD AUTO: 38.1 %
HGB BLD-MCNC: 11.9 G/DL
HGB UR QL STRIP.AUTO: NEGATIVE
IMM GRANULOCYTES # BLD AUTO: 0.01 X10(3) UL (ref 0–1)
IMM GRANULOCYTES NFR BLD: 0.3 %
KETONES UR-MCNC: NEGATIVE MG/DL
LEUKOCYTE ESTERASE UR QL STRIP.AUTO: NEGATIVE
LIPASE SERPL-CCNC: 35 U/L (ref 13–75)
LYMPHOCYTES # BLD AUTO: 1.68 X10(3) UL (ref 1–4)
LYMPHOCYTES NFR BLD AUTO: 43.5 %
MCH RBC QN AUTO: 26.2 PG (ref 26–34)
MCHC RBC AUTO-ENTMCNC: 31.2 G/DL (ref 31–37)
MCV RBC AUTO: 83.7 FL
MONOCYTES # BLD AUTO: 0.25 X10(3) UL (ref 0.1–1)
MONOCYTES NFR BLD AUTO: 6.5 %
NEUTROPHILS # BLD AUTO: 1.87 X10 (3) UL (ref 1.5–7.7)
NEUTROPHILS # BLD AUTO: 1.87 X10(3) UL (ref 1.5–7.7)
NEUTROPHILS NFR BLD AUTO: 48.4 %
NITRITE UR QL STRIP.AUTO: NEGATIVE
OSMOLALITY SERPL CALC.SUM OF ELEC: 291 MOSM/KG (ref 275–295)
PH UR: 7.5 [PH] (ref 5–8)
PLATELET # BLD AUTO: 216 10(3)UL (ref 150–450)
POTASSIUM SERPL-SCNC: 3.9 MMOL/L (ref 3.5–5.1)
PROT SERPL-MCNC: 7.4 G/DL (ref 5.7–8.2)
PROT UR-MCNC: 30 MG/DL
RBC # BLD AUTO: 4.55 X10(6)UL
SODIUM SERPL-SCNC: 142 MMOL/L (ref 136–145)
SP GR UR STRIP: 1.02 (ref 1–1.03)
UROBILINOGEN UR STRIP-ACNC: 2
WBC # BLD AUTO: 3.9 X10(3) UL (ref 4–11)

## 2024-02-16 PROCEDURE — 83690 ASSAY OF LIPASE: CPT | Performed by: EMERGENCY MEDICINE

## 2024-02-16 PROCEDURE — 81001 URINALYSIS AUTO W/SCOPE: CPT | Performed by: EMERGENCY MEDICINE

## 2024-02-16 PROCEDURE — 85025 COMPLETE CBC W/AUTO DIFF WBC: CPT | Performed by: EMERGENCY MEDICINE

## 2024-02-16 PROCEDURE — 80076 HEPATIC FUNCTION PANEL: CPT | Performed by: EMERGENCY MEDICINE

## 2024-02-16 PROCEDURE — 96375 TX/PRO/DX INJ NEW DRUG ADDON: CPT

## 2024-02-16 PROCEDURE — 99213 OFFICE O/P EST LOW 20 MIN: CPT | Performed by: NURSE PRACTITIONER

## 2024-02-16 PROCEDURE — 80048 BASIC METABOLIC PNL TOTAL CA: CPT | Performed by: EMERGENCY MEDICINE

## 2024-02-16 PROCEDURE — 81025 URINE PREGNANCY TEST: CPT

## 2024-02-16 PROCEDURE — 96361 HYDRATE IV INFUSION ADD-ON: CPT

## 2024-02-16 PROCEDURE — 3008F BODY MASS INDEX DOCD: CPT | Performed by: NURSE PRACTITIONER

## 2024-02-16 PROCEDURE — 3078F DIAST BP <80 MM HG: CPT | Performed by: NURSE PRACTITIONER

## 2024-02-16 PROCEDURE — 3074F SYST BP LT 130 MM HG: CPT | Performed by: NURSE PRACTITIONER

## 2024-02-16 PROCEDURE — 99284 EMERGENCY DEPT VISIT MOD MDM: CPT

## 2024-02-16 PROCEDURE — 96374 THER/PROPH/DIAG INJ IV PUSH: CPT

## 2024-02-16 PROCEDURE — 74177 CT ABD & PELVIS W/CONTRAST: CPT | Performed by: EMERGENCY MEDICINE

## 2024-02-16 PROCEDURE — 99285 EMERGENCY DEPT VISIT HI MDM: CPT

## 2024-02-16 RX ORDER — BENZTROPINE MESYLATE 1 MG/ML
0.5 INJECTION INTRAMUSCULAR; INTRAVENOUS ONCE
Status: COMPLETED | OUTPATIENT
Start: 2024-02-16 | End: 2024-02-16

## 2024-02-16 RX ORDER — DIPHENHYDRAMINE HYDROCHLORIDE 50 MG/ML
25 INJECTION INTRAMUSCULAR; INTRAVENOUS ONCE
Status: COMPLETED | OUTPATIENT
Start: 2024-02-16 | End: 2024-02-16

## 2024-02-16 RX ORDER — DROPERIDOL 2.5 MG/ML
1.25 INJECTION, SOLUTION INTRAMUSCULAR; INTRAVENOUS ONCE
Status: COMPLETED | OUTPATIENT
Start: 2024-02-16 | End: 2024-02-16

## 2024-02-16 RX ORDER — ONDANSETRON 4 MG/1
4 TABLET, ORALLY DISINTEGRATING ORAL EVERY 8 HOURS PRN
Qty: 10 TABLET | Refills: 0 | Status: SHIPPED | OUTPATIENT
Start: 2024-02-16 | End: 2024-02-23

## 2024-02-16 RX ORDER — DICYCLOMINE HCL 20 MG
20 TABLET ORAL 4 TIMES DAILY PRN
Qty: 30 TABLET | Refills: 0 | Status: SHIPPED | OUTPATIENT
Start: 2024-02-16 | End: 2024-03-17

## 2024-02-16 RX ORDER — DIAZEPAM 5 MG/ML
5 INJECTION, SOLUTION INTRAMUSCULAR; INTRAVENOUS ONCE
Status: COMPLETED | OUTPATIENT
Start: 2024-02-16 | End: 2024-02-16

## 2024-02-16 NOTE — ED QUICK NOTES
Pt states she had a colonoscopy last Monday. Last bm was yesterday. She states she's been vomiting since yesterday and saw some blood. She says she has a malignant growth in her abdomin and planning on having a procedure on Monday for further diagnostics. Afebrile.

## 2024-02-16 NOTE — ED PROVIDER NOTES
Patient Seen in: [facility] Emergency Department      History     Chief Complaint   Patient presents with    Nausea/Vomiting/Diarrhea     Stated Complaint: NV    Subjective:   HPI    Patient presents the emergency department complaining of nausea and vomiting.  She states that she has had vomiting for the last 3 days with difficulty keeping any food or liquids down.  She describes diffuse abdominal pain.  She also states that she had some streaky hematemesis.  She believes she had a fever on Monday but is no fever since then.  Last week she had an upper and lower endoscopy which identified gastritis as well as a small mass in her colon and a polyp that was removed.  She contacted her gastroenterologist who recommended she come to the emergency department for concerns of dehydration and to evaluate the abdominal pain and vomiting.    Objective:   No pertinent past medical history.            No pertinent past surgical history.              No pertinent social history.            Review of Systems    Positive for stated complaint: NV  Other systems are as noted in HPI.  Constitutional and vital signs reviewed.      All other systems reviewed and negative except as noted above.    Physical Exam     ED Triage Vitals [02/16/24 0711]   BP 93/60   Pulse 90   Resp 18   Temp 98.5 °F (36.9 °C)   Temp src Temporal   SpO2 97 %   O2 Device None (Room air)       Current:/54   Pulse 63   Temp 98.5 °F (36.9 °C) (Temporal)   Resp 18   Ht 160 cm (5' 2.99\")   Wt 83.9 kg   LMP 12/24/2023   SpO2 100%   BMI 32.78 kg/m²         Physical Exam  Vitals and nursing note reviewed.   Constitutional:       General: She is not in acute distress.     Appearance: She is well-developed.   HENT:      Head: Normocephalic.      Nose: Nose normal.      Mouth/Throat:      Mouth: Mucous membranes are moist.   Eyes:      Conjunctiva/sclera: Conjunctivae normal.   Cardiovascular:      Rate and Rhythm: Normal rate and regular rhythm.       Heart sounds: No murmur heard.  Pulmonary:      Effort: Pulmonary effort is normal. No respiratory distress.      Breath sounds: Normal breath sounds.   Abdominal:      General: There is no distension.      Palpations: Abdomen is soft.      Tenderness: There is generalized abdominal tenderness. There is no guarding or rebound.   Musculoskeletal:         General: No tenderness. Normal range of motion.      Cervical back: Normal range of motion and neck supple.   Skin:     General: Skin is warm and dry.      Findings: No rash.   Neurological:      Mental Status: She is alert and oriented to person, place, and time.               ED Course     Labs Reviewed   BASIC METABOLIC PANEL (8) - Abnormal; Notable for the following components:       Result Value    BUN 5 (*)     BUN/CREA Ratio 7.9 (*)     All other components within normal limits   HEPATIC FUNCTION PANEL (7) - Abnormal; Notable for the following components:    ALT <7 (*)     Bilirubin, Total 1.3 (*)     Bilirubin, Direct 0.5 (*)     All other components within normal limits   URINALYSIS, ROUTINE - Abnormal; Notable for the following components:    Protein Urine 30 (*)     Urobilinogen Urine 2 (*)     Bacteria Urine Rare (*)     Squamous Epi. Cells Few (*)     All other components within normal limits   CBC W/ DIFFERENTIAL - Abnormal; Notable for the following components:    WBC 3.9 (*)     HGB 11.9 (*)     All other components within normal limits   LIPASE - Normal   POCT PREGNANCY URINE - Normal   CBC WITH DIFFERENTIAL WITH PLATELET    Narrative:     The following orders were created for panel order CBC With Differential With Platelet.  Procedure                               Abnormality         Status                     ---------                               -----------         ------                     CBC W/ DIFFERENTIAL[[id number]]          Abnormal            Final result                 Please view results for these tests on the individual orders.                       MDM                   Medical Decision Making  Differential diagnosis considered for viral illness, gastritis, ulcer, bowel obstruction.    Problems Addressed:  Abdominal pain of unknown etiology: acute illness or injury  Nausea and vomiting, unspecified vomiting type: acute illness or injury    Amount and/or Complexity of Data Reviewed  External Data Reviewed: labs and notes.     Details: Laboratory study notes and colonoscopy and EGD results reviewed from last week.  Labs: ordered.     Details: Hemoglobin actually higher than previous laboratory study.  BUN 5 and creatinine normal.  No ketones in the urine indicate dehydration.  Radiology: ordered and independent interpretation performed. Decision-making details documented in ED Course.     Details: CT scan shows no acute findings  Discussion of management or test interpretation with external provider(s): Patient was given droperidol which caused a mild akathisia reaction.  She was given Cogentin and Valium which resolved symptoms completely.  She has had no vomiting here.  Discussed with Dr. Haley.  Recommends discharge and follow-up with Dr. Shafer.    Risk  Prescription drug management.        Disposition and Plan     Clinical Impression:  1. Nausea and vomiting, unspecified vomiting type    2. Abdominal pain of unknown etiology         Disposition:  Discharge  2/16/2024 10:11 am    Follow-up:  Zaira Shafer MD  [address] [place] [id number]  [phone]    Schedule an appointment as soon as possible for a visit            Medications Prescribed:  Current Discharge Medication List        START taking these medications    Details   dicyclomine 20 MG Oral Tab Take 1 tablet (20 mg total) by mouth 4 (four) times daily as needed.  Qty: 30 tablet, Refills: 0      ondansetron 4 MG Oral Tablet Dispersible Take 1 tablet (4 mg total) by mouth every 8 (eight) hours as needed.  Qty: 10 tablet, Refills: 0

## 2024-02-16 NOTE — TELEPHONE ENCOUNTER
Pt came into office stating she has FMLA paperwork. Did not have physical copies. Please advise on further steps and MEJ[employer] forms.     [phone]

## 2024-02-16 NOTE — PROGRESS NOTES
HPI    Pt here for follow up on GI issues-had colonoscopy last Monday. Was dx's w neuroendocrine tumor  in colon. Has an ultrasound guided exam on 3/11 for further evaluation of tumor.   Has follow up appointment 5/6 for EGD for ulcers.     Was seen in ER for nausea due to nausea and vomiting.   Moved bowels yest for first time since colonoscopy.       Review of Systems   Constitutional:  Positive for activity change and appetite change.   Gastrointestinal:  Positive for abdominal pain, nausea and vomiting. Negative for constipation.       Vitals:    02/16/24 1349   BP: 95/62   Pulse: 101   Weight: 190 lb 6.4 oz (86.4 kg)   Height: 5' 2.9\" (1.598 m)     Body mass index is 33.84 kg/m².  Wt Readings from Last 6 Encounters:   02/16/24 190 lb 6.4 oz (86.4 kg)   02/16/24 185 lb (83.9 kg)   02/02/24 183 lb (83 kg)   01/08/24 194 lb (88 kg)   12/31/23 190 lb (86.2 kg)   12/29/23 190 lb (86.2 kg)        Health Maintenance   Topic Date Due    Pneumococcal Vaccine: Birth to 64yrs (1 of 2 - PCV) 11/03/2006    DTaP,Tdap,and Td Vaccines (7 - Td or Tdap) 01/18/2022    HPV Vaccines (2 - Risk 3-dose series) 03/14/2023    COVID-19 Vaccine (2 - 2023-24 season) 09/01/2023    Influenza Vaccine (1) 10/01/2023    Annual Depression Screening  01/01/2024    Chlamydia Screening  08/23/2024    Annual Physical  08/23/2024    Pap Smear  08/23/2026       Patient's last menstrual period was 01/20/2024 (exact date).    Past Medical History:   Diagnosis Date    Anxiety     Depression     Fibromyalgia     Lupus (HCC) 2019    Seizure disorder (HCC)        .No past surgical history on file.    Family History   Problem Relation Age of Onset    Heart Attack Father     Anxiety Mother     Cancer Maternal Grandmother         breast and kidney cancer    Diabetes Maternal Grandmother     Heart Disease Maternal Grandfather     Diabetes Paternal Grandmother     Heart Attack Paternal Grandfather        Social History     Socioeconomic History    Marital  status: Single     Spouse name: Not on file    Number of children: Not on file    Years of education: Not on file    Highest education level: Not on file   Occupational History    Not on file   Tobacco Use    Smoking status: Never    Smokeless tobacco: Never   Vaping Use    Vaping Use: Never used   Substance and Sexual Activity    Alcohol use: Never    Drug use: Never    Sexual activity: Not on file   Other Topics Concern    Not on file   Social History Narrative    Not on file     Social Determinants of Health     Financial Resource Strain: Not on file   Food Insecurity: Not on file   Transportation Needs: Not on file   Physical Activity: Not on file   Stress: Not on file   Social Connections: Not on file   Housing Stability: Not on file       Current Outpatient Medications   Medication Sig Dispense Refill    dicyclomine 20 MG Oral Tab Take 1 tablet (20 mg total) by mouth 4 (four) times daily as needed. 30 tablet 0    ondansetron 4 MG Oral Tablet Dispersible Take 1 tablet (4 mg total) by mouth every 8 (eight) hours as needed. 10 tablet 0    Omeprazole 40 MG Oral Capsule Delayed Release Take 1 capsule (40 mg total) by mouth every 12 (twelve) hours. 180 capsule 3    topiramate 25 MG Oral Tab Take 1 tablet (25 mg total) by mouth daily. 30 tablet 1    doxepin 10 MG Oral Cap 1-2 tablet orally every night as directed 60 capsule 0    Ferrous Sulfate 325 (65 Fe) MG Oral Tab Take 1 tablet (325 mg total) by mouth daily with breakfast. 30 tablet 1    Potassium Chloride ER 10 MEQ Oral Tab CR Take 1 tablet (10 mEq total) by mouth daily. 30 tablet 0    Cholecalciferol 50 MCG (2000 UT) Oral Tab Take 1 tablet (2,000 Units total) by mouth As Directed.      Cyanocobalamin 2500 MCG Oral Tab Take by mouth As Directed.      folic acid 400 MCG Oral Tab Take 1 tablet (400 mcg total) by mouth As Directed.      Phentermine HCl 37.5 MG Oral Tab Take 1 tablet (37.5 mg total) by mouth every morning before breakfast. (Patient not taking:  Reported on 2/16/2024) 30 tablet 0    ketoconazole 2 % External Cream Apply to affected area bid-continue to use for 5 days after all evidence of rash or redness has resolved (Patient not taking: Reported on 2/16/2024) 60 g 1    valACYclovir 500 MG Oral Tab  (Patient not taking: Reported on 2/16/2024)         Allergies:  Allergies   Allergen Reactions    Amoxicillin HIVES    Penicillins DIARRHEA, HIVES, NAUSEA ONLY and RASH     Other reaction(s): GI Symptoms, Hives   Other reaction(s): GI Symptoms   Other reaction(s): Hives    Sulfa Antibiotics DIARRHEA, RASH, HIVES and UNKNOWN     Other reaction(s): Unknown    Tomato HIVES, NAUSEA ONLY and NAUSEA AND VOMITING    Wandy Protect RASH    Dimethicone-Zinc Oxide RASH    Zinc Oxide DIARRHEA and RASH       Physical Exam  Vitals and nursing note reviewed.   Constitutional:       Appearance: Normal appearance.   Cardiovascular:      Rate and Rhythm: Normal rate and regular rhythm.   Pulmonary:      Effort: Pulmonary effort is normal. No respiratory distress.      Breath sounds: Normal breath sounds.   Abdominal:      General: Bowel sounds are increased. There is no distension.      Palpations: Abdomen is soft.      Tenderness: There is no abdominal tenderness.      Comments: Bs increased in bilat lower quadrants   Neurological:      Mental Status: She is alert.         Assessment and Plan:  Problem List Items Addressed This Visit       Nausea and vomiting - Primary     Please call if symptoms worsen or are not resolving.  Discussed keeping diet light  Pt advised that she should follow up to Er if symptoms are not improving or are worsening-concern for ileus         Neuroendocrine tumor     Follow up with GI as scheduled.  Discussed w pt red flag symptoms that if they occur, pt should immediately go to ER. Pt states understanding.                          Discussed plan of care with pt and pt is in agreement.All questions answered. Pt to call with questions or  concerns.    Encouraged to sign up for My Chart if not already registered.

## 2024-02-16 NOTE — ED INITIAL ASSESSMENT (HPI)
Pt c/o vomiting since Tuesday, following up with GI with this issue states mass near the colon and future surgery. Pt states fever on Tuesday. Denies urinary s&s, last BM yesterday.

## 2024-02-17 NOTE — ASSESSMENT & PLAN NOTE
Follow up with GI as scheduled.  Discussed w pt red flag symptoms that if they occur, pt should immediately go to ER. Pt states understanding.

## 2024-02-17 NOTE — ASSESSMENT & PLAN NOTE
Please call if symptoms worsen or are not resolving.  Discussed keeping diet light  Pt advised that she should follow up to Er if symptoms are not improving or are worsening-concern for ileus

## 2024-02-19 NOTE — TELEPHONE ENCOUNTER
Scheduled for:  Rectal EUS [id number]  Provider Name:  Dr. Birch   Date:  3/18/2024  Location:  [facility]  Sedation:  MAC  Time:  3:15pm, (pt is aware to arrive at 2:15pm)   Prep:  Golytely  Meds/Allergies Reconciled?:  Physician reviewed     Diagnosis with codes:  rectosigmoid neuroendocrine tumor]   Was patient informed to call insurance with codes (Y/N):  Yes, I confirmed BCBS insurance with this patient.      Referral sent?:  Referral was sent at the time of electronic surgical scheduling.   [facility] or [facility] notified?:  I sent an electronic request to Endo Scheduling and received a confirmation today.      Medication Orders:  n/a  Misc Orders:  n/a     Further instructions given by staff:   I discussed the prep instructions with the patient which she verbally understood and is aware that I will mail the instructions today.

## 2024-02-19 NOTE — TELEPHONE ENCOUNTER
Gi Rns-      Please send prep to patient's pharmacy, patient is scheduled on 3/18/2024.    Thanks!

## 2024-02-19 NOTE — TELEPHONE ENCOUNTER
Prep sent to [facility] pharmacy.     Spoke with patient, name/ verified.    Informed pt that prep sent to [facility], instructed pt to  prep also to read prep instructions in [employer].    Patient verbalized understanding, no further concerns, issues at this time.

## 2024-02-23 ENCOUNTER — PATIENT MESSAGE (OUTPATIENT)
Dept: GASTROENTEROLOGY | Facility: CLINIC | Age: 24
End: 2024-02-23

## 2024-02-26 ENCOUNTER — TELEPHONE (OUTPATIENT)
Facility: CLINIC | Age: 24
End: 2024-02-26

## 2024-02-26 NOTE — TELEPHONE ENCOUNTER
From: Kaylie Vance  To: Zaira Shafer  Sent: 2/23/2024 11:47 PM CST  Subject: Blood in stool    Hello I have been having a lot of blood in my stool. (Looks like cherries) And I have not been able to keep food went the ER last Friday and suggestion ?

## 2024-02-26 NOTE — TELEPHONE ENCOUNTER
Dr. Shafer    Spoke to patient d/t mychart message below    She went to the ER on 2/16/2024 for consist rectal bleeding and cough.  They worked her up in the ER and discharged her home to \"follow up with GI\"    She is still experiencing rectal bleeding.  The blood is mixed in with her stool, and shows on the toilet paper as well.    Pt denies abdominal pain, fever, diarrhea, dizziness, SOB, chest pain.    She is tolerating her diet.    Please advise

## 2024-02-26 NOTE — TELEPHONE ENCOUNTER
----- Message from Kaylie Vance sent at 2/23/2024 11:47 PM CST -----  Regarding: Blood in stool  Contact: [phone]  Hello I have been having a lot of blood in my stool. (Looks like cherries) And I have not been able to keep food went the ER last Friday and suggestion ?

## 2024-02-27 RX ORDER — PANTOPRAZOLE SODIUM 40 MG/1
40 TABLET, DELAYED RELEASE ORAL
Qty: 180 TABLET | Refills: 3 | Status: SHIPPED | OUTPATIENT
Start: 2024-02-27

## 2024-02-27 RX ORDER — ONDANSETRON 4 MG/1
4 TABLET, ORALLY DISINTEGRATING ORAL EVERY 8 HOURS PRN
Qty: 4 TABLET | Refills: 0 | Status: SHIPPED | OUTPATIENT
Start: 2024-02-27

## 2024-02-27 RX ORDER — SODIUM, POTASSIUM,MAG SULFATES 17.5-3.13G
SOLUTION, RECONSTITUTED, ORAL ORAL
Qty: 1 EACH | Refills: 0 | Status: SHIPPED | OUTPATIENT
Start: 2024-02-27

## 2024-02-27 NOTE — TELEPHONE ENCOUNTER
Called pt back.  She states she has been having intermittent nausea and vomiting and blood in the stool.  She has been moving her bowels once or twice a week and last BM was Saturday, when her stools looked like \"cherries.\"  Denies rectal bleeding since then.  She has been taking miralax 1/2 capful every day along with omeprazole 40 mg BID.    Pt went to pelvic floor PT in the past. In the past, she has tried miralax and milk of magnesia and other OTC laxatives, which didn't help.    Denies f/c, dizziness, or any other symptoms.      Recent ER visit noted with reassuring CT and blood work, including Hgb of 11.8.      Recommend stopping omeprazole and starting pantoprazole 40 mg BID.  Also recommend increasing miralax to 1 capful daily and if constipation persists, add milk of magnesia at night.      Also recommend follow up on March 5th at 3:30 PM.      GI staff - please add pt to my schedule 3/5 at 3:30 PM. OK to overbook. No need to call as she confirmed the appointment date and time. Thanks! SS

## 2024-02-28 NOTE — TELEPHONE ENCOUNTER
Added patient to schedule per message below    Your Appointments      Tuesday March 05, 2024  3:30 PM  Follow Up Visit with Zaira Shafer MD  [facility] ([facility]) [address] [place] [id number]  [phone]

## 2024-02-29 NOTE — TELEPHONE ENCOUNTER
Pt called requesting status on FMLA forms. Informed pt we still do not have forms as stated on previous encounter pt did not drop off forms. Pt states she dropped off forms at [facility]. Asked pt to email forms as we do not have FMLA forms.     Type of Leave: Pt's FMLA  Reason for Leave: Neuroendocrine tumor   Start date of leave: 3/4/24  How much time needed?: 4 wks pending recovery   Forms Due Date:   Was Fee and Turnaround info Given?:      Type of Leave: Mom's FMLA  Reason for Leave: care for pt  Start date of leave: 3/18/24  How much time needed?: 3/25/24  Forms Due Date:  Was Fee and Turnaround info Given?:

## 2024-02-29 NOTE — TELEPHONE ENCOUNTER
Tabitha,    Pt is requesting continuous FMLA due to her ongoing medical treatments for Neuroendocrine Tumor. Pt is requesting to start her leave on 3/4/24 - 4/15/24.   Pt's mom is also requesting continuous FMLA to care for pt during post op recovery. Pt's mom is requesting 3/18/24-3/25/24.  Do you support?    Thank you,  Tabitha DICKSON

## 2024-03-01 NOTE — TELEPHONE ENCOUNTER
Tabitha,     *The ACKNOWLEDGE button has been moved to the top right ribbon*    Please sign off on 2 forms if you agree to: Pt's FMLA and Mother's FMLA  (place your signature on the first page only)    -From your Inbasket, Highlight the patient and click Chart   -Double click the 2/16/24 Forms Completion telephone encounter  -Scroll down to the Media section   -Click the blue Hyperlink: FMLA ISA Hernandez 3/1/24, Disab ISA Hernandez 3/1/24  -Click Acknowledge located in the top right ribbon/menu   -Drag the mouse into the blank space of the document and a + sign will appear. Left click to   electronically sign the document.     Thank you,    Tabitha DICKSON

## 2024-03-06 ENCOUNTER — TELEPHONE (OUTPATIENT)
Dept: GASTROENTEROLOGY | Facility: CLINIC | Age: 24
End: 2024-03-06

## 2024-03-06 NOTE — TELEPHONE ENCOUNTER
Both FMLA completed. No MEJIA on file. LM informing pt I cannot fax out but will upload copies to [employer]

## 2024-03-06 NOTE — TELEPHONE ENCOUNTER
[facility] Dr [facility].  Appt needs to be added if pt can come in.

## 2024-03-12 ENCOUNTER — TELEPHONE (OUTPATIENT)
Facility: CLINIC | Age: 24
End: 2024-03-12

## 2024-03-12 DIAGNOSIS — D12.7: Primary | ICD-10-CM

## 2024-03-13 NOTE — TELEPHONE ENCOUNTER
Rescheduled for:  Rectal EUS [id number]  Provider Name:  Dr. Birch   Date:  FROM 3/18/2024 TO 4/8/2024  Location:  [facility]  Sedation:  MAC  Time:  FROM 3:15pm, TO 7:30am (pt is aware to arrive at 6:30am)   Prep:  Golytely  Meds/Allergies Reconciled?:  Physician reviewed      Diagnosis with codes:  rectosigmoid neuroendocrine tumor]   Was patient informed to call insurance with codes (Y/N):  Yes, I confirmed BCBS insurance with this patient.      Referral sent?:  Referral was sent at the time of electronic surgical scheduling.   EM or [facility] notified?:  I sent an electronic request to Endo Scheduling and received a confirmation today.      Medication Orders:  n/a  Misc Orders:  n/a     Further instructions given by staff:   I discussed the prep instructions with the patient which she verbally understood and is aware that I will mail the instructions today.

## 2024-03-13 NOTE — TELEPHONE ENCOUNTER
PPD-    Patient rectal eus [id number] is now scheduled on 4/8/2024 at [facility] with Dr. Birch, dx codes rectosigmoid neuroendocrine tumor D12.7    Thanks!

## 2024-03-14 ENCOUNTER — OFFICE VISIT (OUTPATIENT)
Dept: FAMILY MEDICINE CLINIC | Facility: CLINIC | Age: 24
End: 2024-03-14
Payer: COMMERCIAL

## 2024-03-14 VITALS
OXYGEN SATURATION: 98 % | WEIGHT: 185 LBS | HEIGHT: 62 IN | TEMPERATURE: 98 F | BODY MASS INDEX: 34.04 KG/M2 | RESPIRATION RATE: 16 BRPM | HEART RATE: 78 BPM | DIASTOLIC BLOOD PRESSURE: 54 MMHG | SYSTOLIC BLOOD PRESSURE: 107 MMHG

## 2024-03-14 DIAGNOSIS — J01.00 ACUTE MAXILLARY SINUSITIS, RECURRENCE NOT SPECIFIED: Primary | ICD-10-CM

## 2024-03-14 PROCEDURE — 3078F DIAST BP <80 MM HG: CPT | Performed by: NURSE PRACTITIONER

## 2024-03-14 PROCEDURE — 3074F SYST BP LT 130 MM HG: CPT | Performed by: NURSE PRACTITIONER

## 2024-03-14 PROCEDURE — 99213 OFFICE O/P EST LOW 20 MIN: CPT | Performed by: NURSE PRACTITIONER

## 2024-03-14 PROCEDURE — 3008F BODY MASS INDEX DOCD: CPT | Performed by: NURSE PRACTITIONER

## 2024-03-14 RX ORDER — DOXYCYCLINE HYCLATE 100 MG/1
100 CAPSULE ORAL 2 TIMES DAILY
Qty: 14 CAPSULE | Refills: 0 | Status: SHIPPED | OUTPATIENT
Start: 2024-03-14 | End: 2024-03-21

## 2024-03-14 NOTE — PROGRESS NOTES
CHIEF COMPLAINT:     Chief Complaint   Patient presents with    Cough     Vomiting, runny nose, clogged ears  x 1 week        HPI:   Kaylie Vance is a 23 year old female who presents for upper respiratory symptoms for  1 weeks. Patient reports  rhinorrhea, cough, sinus pressure, and clogged ears , vomiting developed in last three days after eating. No blood in stool or emesis. Pt reports mucous in emesis. No diarrhea.  Symptoms have been worse since onset.  Treating symptoms with nothing.   Associated symptoms include see above.     Pt reports hx of neuroendocrine tumor - being removed April 8th. Hx of nausea and vomiting due to this tumor.     Current Outpatient Medications   Medication Sig Dispense Refill    doxycycline 100 MG Oral Cap Take 1 capsule (100 mg total) by mouth 2 (two) times daily for 7 days. 14 capsule 0    pantoprazole 40 MG Oral Tab EC Take 1 tablet (40 mg total) by mouth 2 (two) times daily before meals. 180 tablet 3    dicyclomine 20 MG Oral Tab Take 1 tablet (20 mg total) by mouth 4 (four) times daily as needed. 30 tablet 0    topiramate 25 MG Oral Tab Take 1 tablet (25 mg total) by mouth daily. 30 tablet 1    doxepin 10 MG Oral Cap 1-2 tablet orally every night as directed 60 capsule 0    Ferrous Sulfate 325 (65 Fe) MG Oral Tab Take 1 tablet (325 mg total) by mouth daily with breakfast. 30 tablet 1    Potassium Chloride ER 10 MEQ Oral Tab CR Take 1 tablet (10 mEq total) by mouth daily. 30 tablet 0    Cholecalciferol 50 MCG (2000 UT) Oral Tab Take 1 tablet (2,000 Units total) by mouth As Directed.      Cyanocobalamin 2500 MCG Oral Tab Take by mouth As Directed.      folic acid 400 MCG Oral Tab Take 1 tablet (400 mcg total) by mouth As Directed.      Na Sulfate-K Sulfate-Mg Sulf (SUPREP BOWEL PREP KIT) 17.5-3.13-1.6 GM/177ML Oral Solution Take as directed (Patient not taking: Reported on 3/14/2024) 1 each 0    ondansetron 4 MG Oral Tablet Dispersible Take 1 tablet (4 mg total)  by mouth every 8 (eight) hours as needed for Nausea. (Patient not taking: Reported on 3/14/2024) 4 tablet 0    Phentermine HCl 37.5 MG Oral Tab Take 1 tablet (37.5 mg total) by mouth every morning before breakfast. (Patient not taking: Reported on 2/16/2024) 30 tablet 0    ketoconazole 2 % External Cream Apply to affected area bid-continue to use for 5 days after all evidence of rash or redness has resolved (Patient not taking: Reported on 2/16/2024) 60 g 1    valACYclovir 500 MG Oral Tab  (Patient not taking: Reported on 2/16/2024)        Past Medical History:   Diagnosis Date    Anxiety     Depression     Fibromyalgia     Lupus ([facility]) 2019    PONV (postoperative nausea and vomiting)     Seizure disorder ([facility])       History reviewed. No pertinent surgical history.      Social History     Socioeconomic History    Marital status: Single   Tobacco Use    Smoking status: Never    Smokeless tobacco: Never   Vaping Use    Vaping Use: Never used   Substance and Sexual Activity    Alcohol use: Never    Drug use: Never         REVIEW OF SYSTEMS:   GENERAL:decreased appetite  SKIN: no rashes or abnormal skin lesions  HEENT: See HPI  LUNGS: denies shortness of breath or wheezing, See HPI  CARDIOVASCULAR: denies chest pain or palpitations   GI: denies abdominal pain  NEURO: Denies headaches    EXAM:   /54 (BP Location: Left arm, Patient Position: Sitting, Cuff Size: adult)   Pulse 78   Temp 97.8 °F (36.6 °C) (Tympanic)   Resp 16   Ht 5' 2\" (1.575 m)   Wt 185 lb (83.9 kg)   LMP 01/13/2024 (Exact Date)   SpO2 98%   Breastfeeding No   BMI 33.84 kg/m²   GENERAL: well developed, well nourished,in no apparent distress  SKIN: no rashes,no suspicious lesions  HEAD: atraumatic, normocephalic.  + tenderness on palpation of maxillary sinuses  EYES: conjunctiva clear, EOM intact  EARS: TM's gray, no bulging, noretraction,no fluid, bony landmarks visible  NOSE: Nostrils patent, + nasal discharge, nasal mucosa inflamed    THROAT: Oral mucosa pink, moist. Posterior pharynx is not erythematous. no exudates. Tonsils 1/4.    NECK: Supple, non-tender  LUNGS: clear to auscultation bilaterally, no wheezes or rhonchi. Breathing is non labored.  CARDIO: RRR without murmur  GI: ABD soft and non distended. active BS's x4,no masses, hepatosplenomegaly, or tenderness on direct palpation  EXTREMITIES: no cyanosis, clubbing or edema  LYMPH:  No lymphadenopathy.        ASSESSMENT AND PLAN:   Kaylie Vance is a 23 year old female who presents with upper respiratory symptoms that are consistent with    ASSESSMENT:   Encounter Diagnosis   Name Primary?    Acute maxillary sinusitis, recurrence not specified Yes       PLAN: Meds as below.  Comfort care as described in Patient Instructions    Meds & Refills for this Visit:  Requested Prescriptions     Signed Prescriptions Disp Refills    doxycycline 100 MG Oral Cap 14 capsule 0     Sig: Take 1 capsule (100 mg total) by mouth 2 (two) times daily for 7 days.     Pt on pantoprazole already. Continue as directed by GI specialist. Avoid NSAIDS.     Recommended trial of sudafed and warm compresses to face. If symptoms not improving in 1-2 days. May try the doxycycline as directed. Discussed with patient that she drinks a full glass of water with medication and remain upright for at least 30 minutes after taking medication.     Discussed s/s of worsening infection/condition with Patient and importance of prompt medical re-evaluation including when to seek emergency care. Patient  voiced understanding    Increase fluids and rest.     May consider OTC tylenol as needed and directed on packaging for pain/fever    May consider OTC pseudoephedrine as needed and directed on packaging as a nasal decongestant    Risks, benefits, and side effects of medication discussed. Patient  verbalized understanding and agreement with treatment plan.     All questions and concerns addressed. Encouraged Patient   to call clinic with any questions or concerns. I explained to the patient that emergent conditions may arise and to go to the ER for new, worsening or any persistent conditions.  No acute distress and cleared for home.      Patient Instructions   May take tylenol or sudafed. Avoid NSAIDS like ibuprofen, motrin, aleeve, etc.     The patient indicates understanding of these issues and agrees to the plan.  The patient is asked to return if sx's persist or worsen.

## 2024-03-20 NOTE — TELEPHONE ENCOUNTER
Called [facility] at [phone], spoke with Helena, [place] submitted for [id number], clinicals faxed to [phone], case number [id number].

## 2024-03-21 ENCOUNTER — TELEPHONE (OUTPATIENT)
Facility: CLINIC | Age: 24
End: 2024-03-21

## 2024-03-21 RX ORDER — PANTOPRAZOLE SODIUM 40 MG/1
40 TABLET, DELAYED RELEASE ORAL
Qty: 180 TABLET | Refills: 3 | Status: SHIPPED | OUTPATIENT
Start: 2024-03-21

## 2024-03-21 RX ORDER — SODIUM, POTASSIUM,MAG SULFATES 17.5-3.13G
SOLUTION, RECONSTITUTED, ORAL ORAL
Qty: 1 EACH | Refills: 0 | Status: SHIPPED | OUTPATIENT
Start: 2024-03-21

## 2024-03-21 NOTE — TELEPHONE ENCOUNTER
Patient requesting Suprep & Pantoprazole 40 mg rx to be sent to pharmacy.  Please call.  Thank you.    Current Outpatient Medications   Medication Sig Dispense Refill    pantoprazole 40 MG Oral Tab EC Take 1 tablet (40 mg total) by mouth 2 (two) times daily before meals. 180 tablet 3    Na Sulfate-K Sulfate-Mg Sulf (SUPREP BOWEL PREP KIT) 17.5-3.13-1.6 GM/177ML Oral Solution Take as directed (Patient not taking: Reported on 3/14/2024) 1 each 0

## 2024-04-05 ENCOUNTER — LAB ENCOUNTER (OUTPATIENT)
Dept: LAB | Facility: HOSPITAL | Age: 24
End: 2024-04-05
Attending: NURSE PRACTITIONER
Payer: COMMERCIAL

## 2024-04-05 DIAGNOSIS — R63.2 INCREASED APPETITE: ICD-10-CM

## 2024-04-05 DIAGNOSIS — E66.9 OBESITY (BMI 30-39.9): ICD-10-CM

## 2024-04-05 DIAGNOSIS — K59.00 CONSTIPATION, UNSPECIFIED CONSTIPATION TYPE: ICD-10-CM

## 2024-04-05 DIAGNOSIS — M13.0 POLYARTHROPATHY: Primary | ICD-10-CM

## 2024-04-05 DIAGNOSIS — M54.9 CHRONIC BACK PAIN, UNSPECIFIED BACK LOCATION, UNSPECIFIED BACK PAIN LATERALITY: ICD-10-CM

## 2024-04-05 DIAGNOSIS — G89.29 CHRONIC BACK PAIN, UNSPECIFIED BACK LOCATION, UNSPECIFIED BACK PAIN LATERALITY: ICD-10-CM

## 2024-04-05 LAB
DSDNA IGG SERPL IA-ACNC: 0.8 IU/ML
ENA AB SER QL IA: 0.2 UG/L
ENA AB SER QL IA: NEGATIVE
EST. AVERAGE GLUCOSE BLD GHB EST-MCNC: 91 MG/DL (ref 68–126)
HBA1C MFR BLD: 4.8 % (ref ?–5.7)
INSULIN SERPL-ACNC: 28.2 MU/L (ref 3–25)
T4 FREE SERPL-MCNC: 1.1 NG/DL (ref 0.8–1.7)
TSI SER-ACNC: 0.75 MIU/ML (ref 0.55–4.78)

## 2024-04-05 PROCEDURE — 83036 HEMOGLOBIN GLYCOSYLATED A1C: CPT

## 2024-04-05 PROCEDURE — 84439 ASSAY OF FREE THYROXINE: CPT

## 2024-04-05 PROCEDURE — 36415 COLL VENOUS BLD VENIPUNCTURE: CPT

## 2024-04-05 PROCEDURE — 83525 ASSAY OF INSULIN: CPT

## 2024-04-05 PROCEDURE — 84443 ASSAY THYROID STIM HORMONE: CPT

## 2024-04-05 PROCEDURE — 86038 ANTINUCLEAR ANTIBODIES: CPT

## 2024-04-05 PROCEDURE — 86225 DNA ANTIBODY NATIVE: CPT

## 2024-04-09 ENCOUNTER — HOSPITAL ENCOUNTER (EMERGENCY)
Facility: HOSPITAL | Age: 24
Discharge: HOME | End: 2024-04-09
Attending: EMERGENCY MEDICINE
Payer: COMMERCIAL

## 2024-04-09 VITALS
SYSTOLIC BLOOD PRESSURE: 120 MMHG | DIASTOLIC BLOOD PRESSURE: 72 MMHG | OXYGEN SATURATION: 99 % | HEART RATE: 84 BPM | TEMPERATURE: 98 F | RESPIRATION RATE: 18 BRPM

## 2024-04-09 DIAGNOSIS — Z00.8 ENCOUNTER FOR PSYCHOLOGICAL EVALUATION: Primary | ICD-10-CM

## 2024-04-09 PROCEDURE — 99283 EMERGENCY DEPT VISIT LOW MDM: CPT

## 2024-04-09 PROCEDURE — 99284 EMERGENCY DEPT VISIT MOD MDM: CPT

## 2024-04-09 NOTE — ED QUICK NOTES
Patient provided with discharge instructions. Verbalized understanding for plan of care at home and follow up. All questions/concerns addressed prior to discharge.    Patient being picked up by Trey that was ordered by her fivalentina.

## 2024-04-09 NOTE — DISCHARGE INSTRUCTIONS
Return to ED if symptoms worsen.    Recommendation to follow-up with current provider.  Patient may also choose to contact [facility] at .  Crisis line number [phone] or 1 [phone].

## 2024-04-09 NOTE — ED QUICK NOTES
Patient arrived via [facility] after calling the crisis hotline and making comments about using sleeping aids.     Patient states \"I just called them to talk. I told them my norm for me to get to sleep is literally sometimes 2 muscle relaxer's and some dollar tree sleeping aides.\"

## 2024-04-09 NOTE — ED PROVIDER NOTES
Patient Seen in: [facility] Emergency Department      History     Chief Complaint   Patient presents with    Eval-P     Stated Complaint: Eval-P    Subjective:   The history is provided by the patient.       23 year old female who has h/o anxiety/depression, fibromyalgia, lupus, seizure d/o, recent diagnosis of neuroendocrine tumor of rectosigmoid colon who presents for psych eval. Pt states she was feeling very overwhelmed and stressed bc she recently found out that she has neuroendocrine tumor of her colon. She had surgery scheduled for this week to have it removed and she was really looking forward to getting that done. She also recently found out she was unexpectedly pregnant, and she heard last night that anesthesiologist cancelled her surgery. She was extremely disappointed. She got into a verbal argument with the father of the baby. She denies any physical trauma. She called the mental health crisis line to vent. She states she had to hang up on the crisis person bc she was getting another call, then didn't answer when the person called her back twice. Pt states she was away from home and on returning she found the police at her home. Pt states they were called by the crisis person to do a well-being check after she did not answer her phone. She denies any SI or HI. She has a therapist and saw that person today.     Pathology report from 2/5/24:  Rectosigmoid colon nodule; biopsy:  Multiple fragments of colonic mucosa with well-differentiated neuroendocrine tumor, grade 1, (4 mm in greatest dimension), involving lamina propria and muscularis mucosa.    Objective:   Past Medical History:    Anxiety    Depression    Fibromyalgia    Lupus (HCC)    PONV (postoperative nausea and vomiting)    Seizure disorder (HCC)              History reviewed. No pertinent surgical history.             Social History     Socioeconomic History    Marital status: Single   Tobacco Use    Smoking status: Never     Passive  exposure: Never    Smokeless tobacco: Never   Vaping Use    Vaping status: Never Used   Substance and Sexual Activity    Alcohol use: Never    Drug use: Never     Social Determinants of Health     Financial Resource Strain: Medium Risk (9/10/2022)    Received from [facility]    Overall Financial Resource Strain (CARDIA)     Difficulty of Paying Living Expenses: Somewhat hard   Food Insecurity: Food Insecurity Present (9/10/2022)    Received from [facility]    Hunger Vital Sign     Worried About Running Out of Food in the Last Year: Sometimes true     Ran Out of Food in the Last Year: Never true   Transportation Needs: Unmet Transportation Needs (9/10/2022)    Received from [facility]    PRAPARE - Transportation     Lack of Transportation (Medical): Yes     Lack of Transportation (Non-Medical): Yes   Stress: No Stress Concern Present (9/10/2022)    Received from [facility]    [place] [employer] - Occupational Stress Questionnaire     Feeling of Stress : Not at all    Received from [facility]    Housing Stability              Review of Systems    Positive for stated complaint: Eval-P  Other systems are as noted in HPI.  Constitutional and vital signs reviewed.      All other systems reviewed and negative except as noted above.    Physical Exam     ED Triage Vitals [04/09/24 0016]   /76   Pulse 90   Resp 14   Temp 97.8 °F (36.6 °C)   Temp src Temporal   SpO2 98 %   O2 Device None (Room air)       Current:/72   Pulse 84   Temp 98 °F (36.7 °C) (Oral)   Resp 18   LMP  (LMP Unknown)   SpO2 99%         Physical Exam  Vitals and nursing note reviewed.   Constitutional:       General: She is not in acute distress.     Appearance: Normal appearance. She is well-developed. She is not ill-appearing, toxic-appearing or diaphoretic.   HENT:      Head: Normocephalic and atraumatic.   Eyes:      Conjunctiva/sclera:  Conjunctivae normal.      Pupils: Pupils are equal, round, and reactive to light.   Cardiovascular:      Rate and Rhythm: Normal rate and regular rhythm.      Pulses: Normal pulses.      Heart sounds: Normal heart sounds. No murmur heard.  Pulmonary:      Effort: Pulmonary effort is normal. No respiratory distress.      Breath sounds: Normal breath sounds. No wheezing.   Abdominal:      General: There is no distension.      Palpations: Abdomen is soft.      Tenderness: There is no abdominal tenderness. There is no guarding.   Musculoskeletal:         General: No tenderness. Normal range of motion.      Cervical back: Full passive range of motion without pain, normal range of motion and neck supple. No rigidity. Normal range of motion.      Right lower leg: No edema.      Left lower leg: No edema.   Skin:     General: Skin is warm and dry.      Findings: No rash.   Neurological:      Mental Status: She is alert and oriented to person, place, and time.      GCS: GCS eye subscore is 4. GCS verbal subscore is 5. GCS motor subscore is 6.      Sensory: Sensation is intact. No sensory deficit.      Motor: Motor function is intact. No weakness.   Psychiatric:         Attention and Perception: Attention normal.         Mood and Affect: Mood normal.         Behavior: Behavior normal. Behavior is cooperative.         Thought Content: Thought content is not paranoid. Thought content does not include homicidal or suicidal ideation. Thought content does not include homicidal or suicidal plan.           ED Course     Labs Reviewed - No data to display           MDM      Pulse Ox: 99%, Normal, RA    Medications - No data to display      Pt well-appearing, and interacting appropriately. She is giving clear explanation of her feelings and denies any SI or HI. She was seen by  who agrees with plan that pt safe for dc with close f/u with her therapist.       Disposition and Plan     Clinical Impression:  1. Encounter for  psychological evaluation         Disposition:  Discharge  4/9/2024  3:25 am    Follow-up:  Your therapist    Follow up  Please follow-up with your therapist and with your doctors at the [facility].  Should anything worsen please return to the emergency department immediately.          Medications Prescribed:  Discharge Medication List as of 4/9/2024  3:26 AM
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
## 2024-04-10 ENCOUNTER — TELEPHONE (OUTPATIENT)
Facility: CLINIC | Age: 24
End: 2024-04-10

## 2024-04-10 DIAGNOSIS — D49.0 RECTAL TUMOR: Primary | ICD-10-CM

## 2024-04-10 DIAGNOSIS — D12.7: ICD-10-CM

## 2024-04-10 NOTE — TELEPHONE ENCOUNTER
Scheduled for:  rectal EUS [id number]  Provider Name: Dr Birch  Date: 5/13/2024  Location: [facility]   Sedation: MAC   Time: 2:45pm, (pt is aware to arrive at 1:45pm)   Prep: split golytely   Meds/Allergies Reconciled?: reviewed by provider   Diagnosis with codes: rectosigmoid neuroendocrine tumor     Was patient informed to call insurance with codes (Y/N): Yes      Referral sent?: Yes, [employer] PPO   [facility] or [facility] notified?: I sent an electronic request to Endo Scheduling and received a confirmation today.        Medication Orders: Pt is aware to NOT take iron pills, herbal meds and diet supplements for 7 days before exam. Also to NOT take any form of alcohol, recreational drugs and any forms of ED meds 24 hours before exam.      Misc Orders:       Further instructions given by staff: Instructions given on phone and pt verbalized understanding

## 2024-04-10 NOTE — TELEPHONE ENCOUNTER
Patient indicates her rectal u.s. on 4/8 was cancelled and she is calling to request new date, please call at [phone],thanks.

## 2024-04-12 PROBLEM — O21.9 NAUSEA AND VOMITING DURING PREGNANCY (HCC): Status: ACTIVE | Noted: 2024-02-16

## 2024-04-30 ENCOUNTER — TELEPHONE (OUTPATIENT)
Facility: CLINIC | Age: 24
End: 2024-04-30

## 2024-04-30 ENCOUNTER — MED REC SCAN ONLY (OUTPATIENT)
Dept: FAMILY MEDICINE CLINIC | Facility: CLINIC | Age: 24
End: 2024-04-30

## 2024-04-30 DIAGNOSIS — D3A.8 NEUROENDOCRINE TUMOR (HCC): Primary | ICD-10-CM

## 2024-04-30 NOTE — TELEPHONE ENCOUNTER
I would recommend cancelling this and rescheduling in the future.      Thanks    MD Samuel [facility] [facility] - Gastroenterology  4/30/2024  3:18 PM

## 2024-04-30 NOTE — TELEPHONE ENCOUNTER
Received e-mail from [facility] stating that patient is pregnant and upcoming EGD has been cancelled with Dr. Shafer on 5/6/2024.    [facility] cancelled from Epic and I cancelled EGD appointment.     FYDANDY - Dr. Shafer        Patient is also scheduled for rectal EUS on 5/13/2024 with Dr. Birch.     Dr. Birch - should patient keep scheduled procedure with you on 5/13 or needs to be cancelled due to pregnancy. Please advise, thank you!

## 2024-05-02 NOTE — TELEPHONE ENCOUNTER
Called and spoke with patient, name/ verified.    Informed pt that we will cancel her 24 EUS per Dr. Birch.     GI Office contact number provided for pt to call to reschedule in the future, pt made aware about this.     She verbalized understanding.     Surgical case cancel request entered in Epic, appt removed from appt desk.     Fyi: GI

## 2024-05-03 ENCOUNTER — TELEMEDICINE (OUTPATIENT)
Dept: FAMILY MEDICINE CLINIC | Facility: CLINIC | Age: 24
End: 2024-05-03

## 2024-05-03 DIAGNOSIS — D3A.8 NEUROENDOCRINE TUMOR (HCC): Primary | ICD-10-CM

## 2024-05-03 DIAGNOSIS — Z34.90 PREGNANCY, UNSPECIFIED GESTATIONAL AGE (HCC): ICD-10-CM

## 2024-05-03 DIAGNOSIS — R55 SYNCOPE AND COLLAPSE: ICD-10-CM
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
## 2024-05-06 ENCOUNTER — MED REC SCAN ONLY (OUTPATIENT)
Dept: FAMILY MEDICINE CLINIC | Facility: CLINIC | Age: 24
End: 2024-05-06

## 2024-05-08 ENCOUNTER — TELEPHONE (OUTPATIENT)
Dept: NEUROLOGY | Facility: CLINIC | Age: 24
End: 2024-05-08

## 2024-05-08 ENCOUNTER — MED REC SCAN ONLY (OUTPATIENT)
Dept: GASTROENTEROLOGY | Facility: CLINIC | Age: 24
End: 2024-05-08

## 2024-05-08 NOTE — TELEPHONE ENCOUNTER
Pt called in was  a former pt of dr clayton love. Looking to reestablish care is currently pregnant and having episode of seizures passing out frequently. Was advised to f/ up with neuro. Pt set up appt 7/18 with dr cazares. Pls advise if possible to schedule pt sooner.

## 2024-05-08 NOTE — PROGRESS NOTES
Received approval for scheduled EGD 5/06/2024 from [facility]   Case # [id number]    Sent to scan

## 2024-05-09 ENCOUNTER — MEDICAL CORRESPONDENCE (OUTPATIENT)
Dept: FAMILY MEDICINE CLINIC | Facility: CLINIC | Age: 24
End: 2024-05-09

## 2024-05-09 NOTE — TELEPHONE ENCOUNTER
Contact pt [facility] offering appt 6/6 2317 with dr cazares. Advised to cb to confirm appt.

## 2024-05-15 ENCOUNTER — HOSPITAL ENCOUNTER (OUTPATIENT)
Age: 24
Discharge: HOME | End: 2024-05-15

## 2024-05-15 VITALS
OXYGEN SATURATION: 100 % | HEART RATE: 71 BPM | TEMPERATURE: 97 F | RESPIRATION RATE: 18 BRPM | DIASTOLIC BLOOD PRESSURE: 55 MMHG | SYSTOLIC BLOOD PRESSURE: 112 MMHG

## 2024-05-15 DIAGNOSIS — H60.502 ACUTE OTITIS EXTERNA OF LEFT EAR, UNSPECIFIED TYPE: Primary | ICD-10-CM

## 2024-05-15 PROCEDURE — 99213 OFFICE O/P EST LOW 20 MIN: CPT | Performed by: NURSE PRACTITIONER

## 2024-05-15 RX ORDER — CEFDINIR 300 MG/1
300 CAPSULE ORAL 2 TIMES DAILY
Qty: 14 CAPSULE | Refills: 0 | Status: SHIPPED | OUTPATIENT
Start: 2024-05-15 | End: 2024-05-22

## 2024-05-15 NOTE — ED PROVIDER NOTES
Patient Seen in: [facility]      History     Chief Complaint   Patient presents with    Ear Pain     Stated Complaint: Ear Pain  Subjective:   23-year-old healthy female presents for left ear discomfort that started today.  She is currently 15 weeks pregnant.  She is seeking regular prenatal care without complication.  She denies any pregnancy concerns.  No drainage from the left ear.  She does have some muffled hearing.  No mastoid complaints.  No fevers.  No URI symptoms.  No headaches or dizziness.  No recent swimming.  She appears nontoxic.      Objective:   Past Medical History:    Anxiety    Depression    Fibromyalgia    Lupus (HCC)    PONV (postoperative nausea and vomiting)    Seizure disorder (HCC)            History reviewed. No pertinent surgical history.           Social History     Socioeconomic History    Marital status: Single   Tobacco Use    Smoking status: Never     Passive exposure: Never    Smokeless tobacco: Never   Vaping Use    Vaping status: Never Used   Substance and Sexual Activity    Alcohol use: Never    Drug use: Never     Social Determinants of Health     Financial Resource Strain: Medium Risk (4/23/2024)    Received from [facility]    Overall Financial Resource Strain (CARDIA)     Difficulty of Paying Living Expenses: Somewhat hard   Food Insecurity: Food Insecurity Present (9/10/2022)    Received from [facility]    Hunger Vital Sign     Worried About Running Out of Food in the Last Year: Sometimes true     Ran Out of Food in the Last Year: Never true   Transportation Needs: No Transportation Needs (4/23/2024)    Received from [facility]    PRAPARE - Transportation     Lack of Transportation (Medical): No     Lack of Transportation (Non-Medical): No   Stress: No Stress Concern Present (9/10/2022)    Received from [facility]    [employer] - Occupational Stress Questionnaire      Feeling of Stress : Not at all    [facility], [facility]    Housing Stability            Review of Systems    Positive for stated complaint: Ear Pain  Other systems are as noted in HPI.  Constitutional and vital signs reviewed.      All other systems reviewed and negative except as noted above.    Physical Exam     ED Triage Vitals [05/15/24 1101]   /55   Pulse 71   Resp 18   Temp 97.3 °F (36.3 °C)   Temp src Temporal   SpO2 100 %   O2 Device None (Room air)     Current:/55   Pulse 71   Temp 97.3 °F (36.3 °C) (Temporal)   Resp 18   LMP  (LMP Unknown)   SpO2 100%     Physical Exam  Vitals and nursing note reviewed.   Constitutional:       Appearance: Normal appearance.   HENT:      Right Ear: Tympanic membrane and ear canal normal.      Left Ear: Tympanic membrane normal. Decreased hearing noted. Drainage and swelling present. No mastoid tenderness.      Ears:      Comments: Erythema with swelling and drainage in the left ear canal consistent with otitis externa.     Nose: Nose normal.      Mouth/Throat:      Mouth: Mucous membranes are moist.   Eyes:      Conjunctiva/sclera: Conjunctivae normal.      Pupils: Pupils are equal, round, and reactive to light.   Cardiovascular:      Rate and Rhythm: Normal rate and regular rhythm.   Pulmonary:      Effort: Pulmonary effort is normal.      Breath sounds: Normal breath sounds.   Musculoskeletal:         General: Normal range of motion.      Cervical back: Normal range of motion and neck supple.   Lymphadenopathy:      Cervical: No cervical adenopathy.   Skin:     General: Skin is warm and dry.      Capillary Refill: Capillary refill takes less than 2 seconds.      Findings: No rash.   Neurological:      General: No focal deficit present.      Mental Status: She is alert and oriented to person, place, and time.   Psychiatric:         Mood and Affect: Mood normal.         Behavior: Behavior normal.          ED Course   No results found.  Labs Reviewed - No data to display    MDM     Medical Decision Making  I will treat the otitis externa with cefdinir due to her being pregnant and her allergies.  We discussed avoiding putting anything in the ear.  She will follow-up as needed with her primary care doctor and take Tylenol as needed for discomfort.    Risk  OTC drugs.  Prescription drug management.  Risk Details: Otitis media versus externa        Disposition and Plan     Clinical Impression:  1. Acute otitis externa of left ear, unspecified type         Disposition:  Discharge  5/15/2024 11:28 am    Follow-up:  Tabitha Siddiqui MD  [address] [place] [id number]  [phone]    Schedule an appointment as soon as possible for a visit   As needed          Medications Prescribed:  Current Discharge Medication List        START taking these medications    Details   cefdinir 300 MG Oral Cap Take 1 capsule (300 mg total) by mouth 2 (two) times daily for 7 days.  Qty: 14 capsule, Refills: 0

## 2024-05-15 NOTE — DISCHARGE INSTRUCTIONS
Avoid putting anything in the ear.  Take the cefdinir as prescribed.  Tylenol as needed for pain.  Follow-up as needed with your doctor.  Return for any concerns.

## 2024-06-06 ENCOUNTER — OFFICE VISIT (OUTPATIENT)
Dept: NEUROLOGY | Facility: CLINIC | Age: 24
End: 2024-06-06
Payer: COMMERCIAL

## 2024-06-06 VITALS — DIASTOLIC BLOOD PRESSURE: 62 MMHG | SYSTOLIC BLOOD PRESSURE: 104 MMHG | HEART RATE: 72 BPM

## 2024-06-06 DIAGNOSIS — G40.309 GENERALIZED EPILEPSY (HCC): Primary | ICD-10-CM

## 2024-06-06 PROCEDURE — 3074F SYST BP LT 130 MM HG: CPT | Performed by: OTHER

## 2024-06-06 PROCEDURE — 3078F DIAST BP <80 MM HG: CPT | Performed by: OTHER

## 2024-06-06 PROCEDURE — 99214 OFFICE O/P EST MOD 30 MIN: CPT | Performed by: OTHER

## 2024-06-06 RX ORDER — LEVETIRACETAM 1000 MG/1
500 TABLET ORAL 2 TIMES DAILY
Qty: 90 TABLET | Refills: 0 | Status: SHIPPED | OUTPATIENT
Start: 2024-06-06 | End: 2024-06-06

## 2024-06-06 RX ORDER — LEVETIRACETAM 500 MG/1
500 TABLET ORAL 2 TIMES DAILY
Qty: 180 TABLET | Refills: 0 | Status: SHIPPED | OUTPATIENT
Start: 2024-06-06 | End: 2024-09-04
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
## 2024-06-19 ENCOUNTER — TELEMEDICINE (OUTPATIENT)
Dept: FAMILY MEDICINE CLINIC | Facility: CLINIC | Age: 24
End: 2024-06-19

## 2024-06-19 ENCOUNTER — TELEPHONE (OUTPATIENT)
Dept: FAMILY MEDICINE CLINIC | Facility: CLINIC | Age: 24
End: 2024-06-19

## 2024-06-19 DIAGNOSIS — Z3A.21 21 WEEKS GESTATION OF PREGNANCY (HCC): ICD-10-CM

## 2024-06-19 DIAGNOSIS — G40.309 GENERALIZED EPILEPSY (HCC): Primary | ICD-10-CM

## 2024-06-19 DIAGNOSIS — J45.40 MODERATE PERSISTENT ASTHMA WITHOUT COMPLICATION (HCC): ICD-10-CM

## 2024-06-19 PROCEDURE — 99214 OFFICE O/P EST MOD 30 MIN: CPT | Performed by: NURSE PRACTITIONER

## 2024-06-19 RX ORDER — ALBUTEROL SULFATE 90 UG/1
2 AEROSOL, METERED RESPIRATORY (INHALATION) EVERY 4 HOURS PRN
Qty: 18 G | Refills: 5 | Status: SHIPPED | OUTPATIENT
Start: 2024-06-19

## 2024-06-19 RX ORDER — BUDESONIDE 90 UG/1
2 AEROSOL, POWDER RESPIRATORY (INHALATION) 2 TIMES DAILY
Qty: 3 EACH | Refills: 5 | Status: SHIPPED | OUTPATIENT
Start: 2024-06-19

## 2024-06-19 NOTE — TELEPHONE ENCOUNTER
Rn called [facility]. She said that they don't have Pulmicort in stock and Albuterol is not covered.     Rn asked for alternates.  She tried to change it to brand and that was rejected. The alternative that may possibly be covered in Levalbuterol.     Tabitha, please advise. Pulmicort out of stock and Levalbuterol is alternate inhaler that may be covered in place of Albuterol.

## 2024-06-19 NOTE — ASSESSMENT & PLAN NOTE
-otc non-drowsy antihistamine (generic claritin, zyrtec or allegra)  -add steroidal nasal spray (flonase, rhinocort -generic works well)  -add pulmicort 2 puffs twice a day and albuterol hfa as directed    -supportive care discussed  -Please call if symptoms worsen or are not resolving.

## 2024-06-19 NOTE — TELEPHONE ENCOUNTER
Spoke to patient. She had a video visit with Tabitha today and was ordered inhalers. Pharmacy told her they don't have it in stock. She is unsure which one they are referring too.     Rn called [facility] to ask which inhaler is not in stock. Pharmacy is closed until 11:30- no option to speak to pharmacy tech. Will try again.

## 2024-06-19 NOTE — PROGRESS NOTES
HPI  Video Call  Pt calling w follow up-saw neuro-had eeg done and compared to EEG done in [place].  Having some left temporal slowing.  It iwas confirmed that the syncopal episodes are seizures. Pt's medication was changed to levetiracetam. Pt was told that the medcation should work right away.   Had syncopal episode on Sat and Sunday.   Has follow up 72 hr eeg mid August and follow up w neuro Sept 3.    Is currently pregnant 31 weeks    Having increase cough and shortness of breath at night. Family noticed when they were out of town together that she coughed all night.     Review of Systems   Neurological:  Positive for seizures and syncope.       There were no vitals filed for this visit.  There is no height or weight on file to calculate BMI.  Wt Readings from Last 6 Encounters:   03/14/24 185 lb (83.9 kg)   03/11/24 185 lb (83.9 kg)   02/16/24 190 lb 6.4 oz (86.4 kg)   02/16/24 185 lb (83.9 kg)   02/02/24 183 lb (83 kg)   01/08/24 194 lb (88 kg)        Health Maintenance   Topic Date Due    Pneumococcal Vaccine: Birth to 64yrs (1 of 2 - PCV) 11/03/2006    Zoster Vaccines (1 of 2) Never done    DTaP,Tdap,and Td Vaccines (7 - Td or Tdap) 01/18/2022    HPV Vaccines (2 - Risk 3-dose series) 03/14/2023    COVID-19 Vaccine (2 - 2023-24 season) 09/01/2023    Annual Depression Screening  01/01/2024    Annual Physical  08/23/2024    Chlamydia Screening  08/23/2024    Influenza Vaccine (Season Ended) 10/01/2024    Pap Smear  08/23/2026       No LMP recorded (lmp unknown). Patient is pregnant.    Past Medical History:    Anxiety    Depression    Fibromyalgia    Lupus (HCC)    PONV (postoperative nausea and vomiting)    Seizure disorder (HCC)       .No past surgical history on file.    Family History   Problem Relation Age of Onset    Heart Attack Father     Anxiety Mother     Cancer Maternal Grandmother         breast and kidney cancer    Diabetes Maternal Grandmother     Heart Disease Maternal Grandfather     Diabetes  Paternal Grandmother     Heart Attack Paternal Grandfather        Social History     Socioeconomic History    Marital status: Single     Spouse name: Not on file    Number of children: Not on file    Years of education: Not on file    Highest education level: Not on file   Occupational History    Not on file   Tobacco Use    Smoking status: Never     Passive exposure: Never    Smokeless tobacco: Never   Vaping Use    Vaping status: Never Used   Substance and Sexual Activity    Alcohol use: Never    Drug use: Never    Sexual activity: Not on file   Other Topics Concern    Not on file   Social History Narrative    Not on file     Social Determinants of Health     Financial Resource Strain: Medium Risk (4/23/2024)    Received from [facility]    Overall Financial Resource Strain (CARDIA)     Difficulty of Paying Living Expenses: Somewhat hard   Food Insecurity: Food Insecurity Present (9/10/2022)    Received from [facility], [facility]    Hunger Vital Sign     Worried About Running Out of Food in the Last Year: Sometimes true     Ran Out of Food in the Last Year: Never true   Transportation Needs: No Transportation Needs (4/23/2024)    Received from [facility]    PRAPARE - Transportation     Lack of Transportation (Medical): No     Lack of Transportation (Non-Medical): No   Stress: No Stress Concern Present (9/10/2022)    Received from [facility], [facility]    [place] of [employer]ational [employer] - Occupational Stress Questionnaire     Feeling of Stress : Not at all   Housing Stability: Low Risk  (8/11/2023)    Received from [facility], [facility]    Housing Stability     Mortgage Payment Concerns?: Not on file     Number of Places Lived in the Last Year: Not on file     Unstable Housing?: Not on file       Current Outpatient Medications   Medication Sig Dispense Refill     albuterol 108 (90 Base) MCG/ACT Inhalation Aero Soln Inhale 2 puffs into the lungs every 4 (four) hours as needed for Wheezing or Shortness of Breath. 18 g 5    Spacer/Aero-Holding Chambers Does not apply Device Use with hfa inhaler as directed 1 each 0    Budesonide (PULMICORT FLEXHALER) 90 MCG/ACT Inhalation Aerosol Powder, Breath Activated Inhale 2 puffs into the lungs 2 (two) times daily. 3 each 5    levetiracetam 500 MG Oral Tab Take 1 tablet (500 mg total) by mouth 2 (two) times daily. 180 tablet 0    busPIRone 10 MG Oral Tab Take 1 tablet (10 mg total) by mouth 3 (three) times daily. 90 tablet 3    Na Sulfate-K Sulfate-Mg Sulf (SUPREP BOWEL PREP KIT) 17.5-3.13-1.6 GM/177ML Oral Solution Take as directed 1 each 0    doxepin 10 MG Oral Cap 1-2 tablet orally every night as directed 60 capsule 0    Ferrous Sulfate 325 (65 Fe) MG Oral Tab Take 1 tablet (325 mg total) by mouth daily with breakfast. 30 tablet 1    Potassium Chloride ER 10 MEQ Oral Tab CR Take 1 tablet (10 mEq total) by mouth daily. 30 tablet 0    Cholecalciferol 50 MCG (2000 UT) Oral Tab Take 1 tablet (2,000 Units total) by mouth As Directed.      Cyanocobalamin 2500 MCG Oral Tab Take by mouth As Directed.      folic acid 400 MCG Oral Tab Take 1 tablet (400 mcg total) by mouth As Directed.         Allergies:  Allergies   Allergen Reactions    Amoxicillin HIVES    Penicillins DIARRHEA, HIVES, NAUSEA ONLY and RASH     Other reaction(s): GI Symptoms, Hives   Other reaction(s): GI Symptoms   Other reaction(s): Hives    Sulfa Antibiotics DIARRHEA, RASH, HIVES and UNKNOWN     Other reaction(s): Unknown    Tomato HIVES, NAUSEA ONLY and NAUSEA AND VOMITING    Wandy Protect RASH    Dimethicone-Zinc Oxide RASH    Zinc Oxide DIARRHEA and RASH       Physical Exam  Nursing note reviewed.   Constitutional:       Appearance: Normal appearance.   Pulmonary:      Effort: Pulmonary effort is normal. No respiratory distress.      Comments: No coughing, audible  wheezing, shortness of breath or difficulty talking in full sentences.       Neurological:      Mental Status: She is alert and oriented to person, place, and time.         Assessment and Plan:  Problem List Items Addressed This Visit       Generalized epilepsy (HCC) - Primary     Has 72 hr EEG scheduled  Advised to follow up w neuro to let her know she is still having seizures  Please call if symptoms worsen or are not resolving.           Moderate persistent asthma without complication (HCC)     -otc non-drowsy antihistamine (generic claritin, zyrtec or allegra)  -add steroidal nasal spray (flonase, rhinocort -generic works well)  -add pulmicort 2 puffs twice a day and albuterol hfa as directed    -supportive care discussed  -Please call if symptoms worsen or are not resolving.            Relevant Medications    albuterol 108 (90 Base) MCG/ACT Inhalation Aero Soln    Spacer/Aero-Holding Chambers Does not apply Device    Budesonide (PULMICORT FLEXHALER) 90 MCG/ACT Inhalation Aerosol Powder, Breath Activated    Pregnancy (HCC)     Continue present management  -otc non-drowsy antihistamine (generic claritin, zyrtec or allegra) for allergies and pulmicort for asthma-is safe in pregnancy                          Discussed plan of care with pt and pt is in agreement.All questions answered. Pt to call with questions or concerns.    Encouraged to sign up for My Chart if not already registered.     Note to patient and family:  The 21st Century Cures Act makes medical notes available to patients in the interest of transparency.  However, please be advised that this is a medical document.  It is intended as a peer to peer communication.  It is written in medical language and may contain abbreviations or verbiage that are technical and unfamiliar.  It may appear blunt or direct.  Medical documents are intended to carry relevant information, facts as evident, and the clinical opinion of the practitioner.

## 2024-06-19 NOTE — ASSESSMENT & PLAN NOTE
Has 72 hr EEG scheduled  Advised to follow up w neuro to let her know she is still having seizures  Please call if symptoms worsen or are not resolving.

## 2024-06-19 NOTE — TELEPHONE ENCOUNTER
Alondra Perkins pharmacist stated that the 18gram albuterol is not covered through the insurance but the 6.7gram is covered, so pharmacist changed it and it went through. Patient advised and advised of Tabitha Hernandez's note. Patient verbalized understanding.

## 2024-06-19 NOTE — ASSESSMENT & PLAN NOTE
Continue present management  -otc non-drowsy antihistamine (generic claritin, zyrtec or allegra) for allergies and pulmicort for asthma-is safe in pregnancy

## 2024-06-19 NOTE — TELEPHONE ENCOUNTER
Pt is 21 weeks pregnant-pulmicort is only cat B inhaled steroid. Levoalbuterol is category c so she can only use albuterol (or whichever brand of plain albuterol is covered).     They will need to order pulmicort or call other pharmacy for availability.

## 2024-06-28 ENCOUNTER — HOSPITAL ENCOUNTER (EMERGENCY)
Facility: HOSPITAL | Age: 24
Discharge: HOME | End: 2024-06-28
Attending: EMERGENCY MEDICINE
Payer: COMMERCIAL

## 2024-06-28 ENCOUNTER — OFFICE VISIT (OUTPATIENT)
Dept: FAMILY MEDICINE CLINIC | Facility: CLINIC | Age: 24
End: 2024-06-28

## 2024-06-28 VITALS
BODY MASS INDEX: 35.88 KG/M2 | WEIGHT: 195 LBS | SYSTOLIC BLOOD PRESSURE: 113 MMHG | HEART RATE: 87 BPM | HEIGHT: 62 IN | OXYGEN SATURATION: 98 % | RESPIRATION RATE: 19 BRPM | TEMPERATURE: 98 F | DIASTOLIC BLOOD PRESSURE: 75 MMHG

## 2024-06-28 VITALS
BODY MASS INDEX: 35.88 KG/M2 | HEIGHT: 62 IN | TEMPERATURE: 97 F | WEIGHT: 195 LBS | HEART RATE: 96 BPM | OXYGEN SATURATION: 98 % | RESPIRATION RATE: 18 BRPM | SYSTOLIC BLOOD PRESSURE: 112 MMHG | DIASTOLIC BLOOD PRESSURE: 70 MMHG

## 2024-06-28 DIAGNOSIS — M54.9 BACK PAIN AFFECTING PREGNANCY IN SECOND TRIMESTER (HCC): Primary | ICD-10-CM

## 2024-06-28 DIAGNOSIS — J30.2 SEASONAL ALLERGIES: Primary | ICD-10-CM

## 2024-06-28 DIAGNOSIS — O99.891 BACK PAIN AFFECTING PREGNANCY IN SECOND TRIMESTER (HCC): Primary | ICD-10-CM

## 2024-06-28 PROCEDURE — 99282 EMERGENCY DEPT VISIT SF MDM: CPT

## 2024-06-28 PROCEDURE — 99213 OFFICE O/P EST LOW 20 MIN: CPT | Performed by: NURSE PRACTITIONER

## 2024-06-28 PROCEDURE — 3078F DIAST BP <80 MM HG: CPT | Performed by: NURSE PRACTITIONER

## 2024-06-28 PROCEDURE — 3008F BODY MASS INDEX DOCD: CPT | Performed by: NURSE PRACTITIONER

## 2024-06-28 PROCEDURE — 3074F SYST BP LT 130 MM HG: CPT | Performed by: NURSE PRACTITIONER

## 2024-06-28 PROCEDURE — 99283 EMERGENCY DEPT VISIT LOW MDM: CPT

## 2024-06-28 RX ORDER — LIDOCAINE 4 G/G
1 PATCH TOPICAL
Qty: 7 PATCH | Refills: 0 | Status: SHIPPED | OUTPATIENT
Start: 2024-06-28 | End: 2024-07-05

## 2024-06-28 NOTE — ED PROVIDER NOTES
Patient Seen in: [facility] Emergency Department      History     Chief Complaint   Patient presents with    Sciatica     Stated Complaint: sciatic pain    Subjective:   HPI    23 year old female with h/o anxiety/depression, lupus, neuroendocrine tumor, sz d/o who is  23 wga and presents with sciatica. She reports having sciatica before pregnancy, now feels it again in same place to L buttocks and radiating down back of her LLE. Worse with movement. She denies any leg swelling, injury, calf pain, upper back pain, or abd pain. Pt states she was just unsure what was safe to take for pain control. She tried heating pad with minimal improvement.     Objective:   Past Medical History:    Anxiety    Depression    Fibromyalgia    Lupus (HCC)    PONV (postoperative nausea and vomiting)    Seizure disorder (HCC)              History reviewed. No pertinent surgical history.             Social History     Socioeconomic History    Marital status: Single   Tobacco Use    Smoking status: Never     Passive exposure: Never    Smokeless tobacco: Never   Vaping Use    Vaping status: Never Used   Substance and Sexual Activity    Alcohol use: Never    Drug use: Never     Social Determinants of Health     Financial Resource Strain: Medium Risk (2024)    Received from [facility]    Overall Financial Resource Strain (CARDIA)     Difficulty of Paying Living Expenses: Somewhat hard   Food Insecurity: Food Insecurity Present (9/10/2022)    Received from [facility], [facility]    Hunger Vital Sign     Worried About Running Out of Food in the Last Year: Sometimes true     Ran Out of Food in the Last Year: Never true   Transportation Needs: No Transportation Needs (2024)    Received from [facility]    PRAPARE - Transportation     Lack of Transportation (Medical): No     Lack of Transportation (Non-Medical): No   Stress: No Stress Concern Present  (9/10/2022)    Received from [facility], [facility] [place] of Occupational Health - Occupational Stress Questionnaire     Feeling of Stress : Not at all    Received from [facility], [facility]    Housing Stability              Review of Systems    Positive for stated Chief Complaint: Sciatica    Other systems are as noted in HPI.  Constitutional and vital signs reviewed.      All other systems reviewed and negative except as noted above.    Physical Exam     ED Triage Vitals [06/28/24 1500]   /75   Pulse 87   Resp 19   Temp 98 °F (36.7 °C)   Temp src Oral   SpO2 98 %   O2 Device None (Room air)       Current Vitals:   Vital Signs  BP: 113/75  Pulse: 87  Resp: 19  Temp: 98 °F (36.7 °C)  Temp src: Oral    Oxygen Therapy  SpO2: 98 %  O2 Device: None (Room air)            Physical Exam  Vitals and nursing note reviewed.   Constitutional:       General: She is not in acute distress.     Appearance: Normal appearance. She is well-developed. She is not ill-appearing.   HENT:      Head: Normocephalic and atraumatic.   Eyes:      Conjunctiva/sclera: Conjunctivae normal.      Pupils: Pupils are equal, round, and reactive to light.   Cardiovascular:      Rate and Rhythm: Normal rate and regular rhythm.      Pulses:           Dorsalis pedis pulses are 2+ on the right side and 2+ on the left side.      Heart sounds: Normal heart sounds. No murmur heard.  Pulmonary:      Effort: Pulmonary effort is normal. No respiratory distress.      Breath sounds: Normal breath sounds. No wheezing.   Abdominal:      General: There is no distension or abdominal bruit.      Palpations: Abdomen is soft. There is no mass or pulsatile mass.      Tenderness: There is no abdominal tenderness. There is no right CVA tenderness or left CVA tenderness.   Musculoskeletal:         General: No swelling. Normal range of motion.      Cervical back: Normal range of motion  and neck supple.      Right lower leg: No edema.      Left lower leg: No edema.        Legs:    Skin:     General: Skin is warm and dry.      Findings: No rash.   Neurological:      Mental Status: She is alert and oriented to person, place, and time. She is not disoriented.      Sensory: No sensory deficit.      Motor: No weakness, tremor, atrophy or abnormal muscle tone.      Coordination: Coordination normal.   Psychiatric:         Mood and Affect: Mood normal.         Behavior: Behavior normal.           ED Course   Labs Reviewed - No data to display           MDM      Pulse Ox: 98%, Normal, RA    Medications - No data to display         Back pain in pregnancy, no other acute concerning findings on examand use lidocaine patch, would not recommend menthol unless cleared by her OB.  Patient advised to take Tylenol. We discussed safe use of lidocaine patch to avoid overuse. F/u with PCP/OB    Disposition and Plan     Clinical Impression:  1. Back pain affecting pregnancy in second trimester (HCC)         Disposition:  Discharge  6/28/2024  3:51 pm    Follow-up:  Tabitha Siddiqui MD  [address] [place] [id number]  [phone]    Schedule an appointment as soon as possible for a visit  Call for next available appointment          Medications Prescribed:  Current Discharge Medication List        START taking these medications    Details   lidocaine (HM LIDOCAINE PATCH) 4 % External Patch Place 1 patch onto the skin Q24H PRN (sciatica pain).  Qty: 7 patch, Refills: 0

## 2024-06-28 NOTE — ED INITIAL ASSESSMENT (HPI)
Pt c/o left sided sciatica pain over the past 24 hours that is giving her trouble sleeping. No known injury.

## 2024-06-28 NOTE — PATIENT INSTRUCTIONS
-Zyrtec as box instructions   -Push fluids and plenty of rest    -Soothing cough drops as packet insert   -Flonase OTC 2 sprays each nostril daily as packet insert.  Stop if any nose bleeds  -Good handwashing, to prevent spread of virus    -Please My Chart your PCP and tell them you were unable to get Pulmicort due to supply.  They will change your medication.     Follow up in 3-5 days for worsening symptoms with clinic or PCP

## 2024-06-28 NOTE — PROGRESS NOTES
CHIEF COMPLAINT:     Chief Complaint   Patient presents with    Cough     Cough ( worse at night ) since February   OTC: None        HPI:   Kaylie Vance is a 23 year old female who presents for cough symptoms for  4 months. Patient reports  dry cough .  Associated symptoms include cough worse at night, occasional wheeze.  Denies fever, chills, GI symptoms.   Symptoms have been persistent since onset.  Treating symptoms with albuterol inhaler that she received on 6/20/2024.     No COVID exposure  She doesn't feel sick.  Currently 21 weeks pregnant.    Her PCP prescribed her Pulmicort but pharmacy was unable to fill.     Current Outpatient Medications   Medication Sig Dispense Refill    albuterol 108 (90 Base) MCG/ACT Inhalation Aero Soln Inhale 2 puffs into the lungs every 4 (four) hours as needed for Wheezing or Shortness of Breath. 18 g 5    levetiracetam 500 MG Oral Tab Take 1 tablet (500 mg total) by mouth 2 (two) times daily. 180 tablet 0    Ferrous Sulfate 325 (65 Fe) MG Oral Tab Take 1 tablet (325 mg total) by mouth daily with breakfast. 30 tablet 1    Potassium Chloride ER 10 MEQ Oral Tab CR Take 1 tablet (10 mEq total) by mouth daily. 30 tablet 0    Cholecalciferol 50 MCG (2000 UT) Oral Tab Take 1 tablet (2,000 Units total) by mouth As Directed.      Cyanocobalamin 2500 MCG Oral Tab Take by mouth As Directed.      folic acid 400 MCG Oral Tab Take 1 tablet (400 mcg total) by mouth As Directed.      Spacer/Aero-Holding Chambers Does not apply Device Use with hfa inhaler as directed 1 each 0    Budesonide (PULMICORT FLEXHALER) 90 MCG/ACT Inhalation Aerosol Powder, Breath Activated Inhale 2 puffs into the lungs 2 (two) times daily. 3 each 5    busPIRone 10 MG Oral Tab Take 1 tablet (10 mg total) by mouth 3 (three) times daily. (Patient not taking: Reported on 6/28/2024) 90 tablet 3    doxepin 10 MG Oral Cap 1-2 tablet orally every night as directed (Patient not taking: Reported on 6/28/2024)  60 capsule 0      Past Medical History:    Anxiety    Depression    Fibromyalgia    Lupus (HCC)    PONV (postoperative nausea and vomiting)    Seizure disorder (HCC)      History reviewed. No pertinent surgical history.      Social History     Socioeconomic History    Marital status: Single   Tobacco Use    Smoking status: Never     Passive exposure: Never    Smokeless tobacco: Never   Vaping Use    Vaping status: Never Used   Substance and Sexual Activity    Alcohol use: Never    Drug use: Never     Social Determinants of Health     Financial Resource Strain: Medium Risk (4/23/2024)    Received from [facility]    Overall Financial Resource Strain (CARDIA)     Difficulty of Paying Living Expenses: Somewhat hard   Food Insecurity: Food Insecurity Present (9/10/2022)    [facility]    Hunger Vital Sign     Worried About Running Out of Food in the Last Year: Sometimes true     Ran Out of Food in the Last Year: Never true   Transportation Needs: No Transportation Needs (4/23/2024)    Received from [facility]    PRAPARE - Transportation     Lack of Transportation (Medical): No     Lack of Transportation (Non-Medical): No   Stress: No Stress Concern Present (9/10/2022)    Received from [facility], [facility]    [place] of Occupational Health - Occupational Stress Questionnaire     Feeling of Stress : Not at all    Received from [facility], [facility]    Housing Stability         REVIEW OF SYSTEMS:   GENERAL: feels well otherwise,   Good appetite.  Happy, glowing.   SKIN: no rashes or abnormal skin lesions  HEENT: See HPI  LUNGS: denies shortness of breath, See HPI  CARDIOVASCULAR: denies chest pain or palpitations   GI: denies N/V/C or abdominal pain  NEURO: Denies headaches    EXAM:   /70   Pulse 96   Temp 97.1 °F (36.2 °C)   Resp 18   Ht 5'  2\" (1.575 m)   Wt 195 lb (88.5 kg)   LMP 01/28/2024 (Approximate)   SpO2 98%   BMI 35.67 kg/m²   GENERAL: well developed, well nourished, in no apparent distress  SKIN: no rashes,no suspicious lesions  HEAD: atraumatic, normocephalic.  No tenderness on palpation of sinuses  EYES: conjunctiva clear  EARS: TM's clear, no bulging, no retraction,+ fluid, bony landmarks visualized  NOSE: Nostrils patent, clear nasal discharge, nasal mucosa pale pink and swollen  THROAT: Oral mucosa pink, moist. Posterior pharynx is not erythematous. Clear post nasal drip.  Tonsils 1/4.    NECK: Supple, non-tender  LUNGS: clear to auscultation bilaterally; good air movement.  Breathing is non labored.  No cough noted in exam room.   CARDIO: RRR without murmur  EXTREMITIES: no cyanosis, clubbing or edema  LYMPH:  No cervical lymphadenopathy.        ASSESSMENT AND PLAN:   Kaylie Vance is a 23 year old female who presents with     ASSESSMENT:   Encounter Diagnosis   Name Primary?    Seasonal allergies Yes       PLAN:   She will try Flonase OTC and Zyrtec daily  No signs of illness   OTC Meds as listed in handout.  Risks, benefits, and side effects of medication explained and discussed.  Discussed likely seasonal allergies and she is 21 weeks pregnant.  Reviewed symptom relief measures with patient.   She is going to message her PCP, she was told there were only 2 steroid inhalers that she would consider during pregnancy.   To f/u with PCP if sx do not resolve as anticipated.      Meds & Refills for this Visit:  Requested Prescriptions      No prescriptions requested or ordered in this encounter           Patient Instructions   -Zyrtec as box instructions   -Push fluids and plenty of rest    -Soothing cough drops as packet insert   -Flonase OTC 2 sprays each nostril daily as packet insert.  Stop if any nose bleeds  -Good handwashing, to prevent spread of virus    -Please My Chart your PCP and tell them you were unable  to get Pulmicort due to supply.  They will change your medication.     Follow up in 3-5 days for worsening symptoms with clinic or PCP         The patient indicates understanding of these issues and agrees to the plan.

## 2024-07-04 ENCOUNTER — HOSPITAL ENCOUNTER (EMERGENCY)
Facility: HOSPITAL | Age: 24
Discharge: HOME | End: 2024-07-04
Attending: EMERGENCY MEDICINE
Payer: COMMERCIAL

## 2024-07-04 ENCOUNTER — HOSPITAL ENCOUNTER (OUTPATIENT)
Age: 24
Discharge: ANOTHER HOSPITAL | End: 2024-07-04
Payer: COMMERCIAL

## 2024-07-04 ENCOUNTER — HOSPITAL ENCOUNTER (EMERGENCY)
Age: 24
Discharge: HOME | End: 2024-07-04
Attending: EMERGENCY MEDICINE

## 2024-07-04 VITALS
OXYGEN SATURATION: 97 % | RESPIRATION RATE: 18 BRPM | HEART RATE: 103 BPM | DIASTOLIC BLOOD PRESSURE: 81 MMHG | TEMPERATURE: 98 F | SYSTOLIC BLOOD PRESSURE: 95 MMHG

## 2024-07-04 VITALS
SYSTOLIC BLOOD PRESSURE: 112 MMHG | WEIGHT: 198.41 LBS | TEMPERATURE: 98 F | HEIGHT: 65 IN | DIASTOLIC BLOOD PRESSURE: 64 MMHG | HEART RATE: 69 BPM | OXYGEN SATURATION: 99 % | BODY MASS INDEX: 33.06 KG/M2 | RESPIRATION RATE: 14 BRPM

## 2024-07-04 VITALS
WEIGHT: 195 LBS | OXYGEN SATURATION: 97 % | BODY MASS INDEX: 36 KG/M2 | HEART RATE: 95 BPM | RESPIRATION RATE: 16 BRPM | SYSTOLIC BLOOD PRESSURE: 114 MMHG | DIASTOLIC BLOOD PRESSURE: 67 MMHG | TEMPERATURE: 98 F

## 2024-07-04 DIAGNOSIS — F32.A DEPRESSION, UNSPECIFIED DEPRESSION TYPE: Primary | ICD-10-CM

## 2024-07-04 DIAGNOSIS — R06.89 BREATHING DIFFICULTY: Primary | ICD-10-CM

## 2024-07-04 DIAGNOSIS — Z3A.24 24 WEEKS GESTATION OF PREGNANCY (HCC): ICD-10-CM

## 2024-07-04 DIAGNOSIS — R06.00 DYSPNEA, UNSPECIFIED TYPE: Primary | ICD-10-CM

## 2024-07-04 PROCEDURE — 99283 EMERGENCY DEPT VISIT LOW MDM: CPT

## 2024-07-04 PROCEDURE — 93005 ELECTROCARDIOGRAM TRACING: CPT

## 2024-07-04 PROCEDURE — 99284 EMERGENCY DEPT VISIT MOD MDM: CPT

## 2024-07-04 PROCEDURE — 99215 OFFICE O/P EST HI 40 MIN: CPT | Performed by: NURSE PRACTITIONER

## 2024-07-04 PROCEDURE — 90839 PSYTX CRISIS INITIAL 60 MIN: CPT

## 2024-07-04 PROCEDURE — 93010 ELECTROCARDIOGRAM REPORT: CPT

## 2024-07-04 RX ORDER — ALBUTEROL SULFATE 90 UG/1
2 AEROSOL, METERED RESPIRATORY (INHALATION) 4 TIMES DAILY
Status: DISCONTINUED | OUTPATIENT
Start: 2024-07-04 | End: 2024-07-04

## 2024-07-04 SDOH — ECONOMIC STABILITY: FOOD INSECURITY: WITHIN THE PAST 12 MONTHS, THE FOOD YOU BOUGHT JUST DIDN'T LAST AND YOU DIDN'T HAVE MONEY TO GET MORE.: NEVER TRUE

## 2024-07-04 SDOH — SOCIAL STABILITY: SOCIAL NETWORK
HOW OFTEN DO YOU SEE OR TALK TO PEOPLE THAT YOU CARE ABOUT AND FEEL CLOSE TO? (FOR EXAMPLE: TALKING TO FRIENDS ON THE PHONE, VISITING FRIENDS OR FAMILY, GOING TO CHURCH OR CLUB MEETINGS): 5 OR MORE TIMES A WEEK

## 2024-07-04 SDOH — ECONOMIC STABILITY: TRANSPORTATION INSECURITY
IN THE PAST 12 MONTHS, HAS LACK OF RELIABLE TRANSPORTATION KEPT YOU FROM MEDICAL APPOINTMENTS, MEETINGS, WORK OR FROM GETTING THINGS NEEDED FOR DAILY LIVING?: PATIENT UNABLE TO ANSWER

## 2024-07-04 SDOH — ECONOMIC STABILITY: GENERAL

## 2024-07-04 SDOH — ECONOMIC STABILITY: HOUSING INSECURITY: DO YOU HAVE PROBLEMS WITH ANY OF THE FOLLOWING?: NONE OF THE ABOVE

## 2024-07-04 SDOH — ECONOMIC STABILITY: HOUSING INSECURITY: WHAT IS YOUR LIVING SITUATION TODAY?: I HAVE A STEADY PLACE TO LIVE

## 2024-07-04 ASSESSMENT — COGNITIVE AND FUNCTIONAL STATUS - GENERAL
LEVEL_OF_CONSCIOUSNESS_CALCULATED: ALERT
AFFECT: APPROPRIATE TO SITUATION
PERCEPTUAL_MISINTERPRETATIONS_HALLUCINATIONS: CLEAR REALITY BASED PERCEPTIONS
MOTOR_BEHAVIOR-RETARDATION_CALCULATED: CALM AND PURPOSEFUL
ORIENTATION: ORIENTED TO PERSON;ORIENTED TO PLACE;ORIENTED TO TIME
SPEECH: CLEAR/UNDERSTANDABLE
ATTENTION_CALCULATED: MAINTAINS ATTENTION
BEHAVIOR: APPROPRIATE TO SITUATION
MEMORY: INTACT
MOTOR_BEHAVIOR-AGITATION_CALCULATED: CALM AND PURPOSEFUL
MOOD: UNREMARKABLE

## 2024-07-04 ASSESSMENT — ENCOUNTER SYMPTOMS
SHORTNESS OF BREATH: 0
SORE THROAT: 0
VOMITING: 0
COUGH: 0
ABDOMINAL PAIN: 0
FEVER: 0
CHILLS: 0
HEADACHES: 0
NAUSEA: 0

## 2024-07-04 ASSESSMENT — PAIN SCALES - GENERAL: PAINLEVEL_OUTOF10: 0

## 2024-07-04 ASSESSMENT — LIFESTYLE VARIABLES
ALCOHOL_USE_STATUS: NO OR LOW RISK WITH VALIDATED TOOL
HOW OFTEN DO YOU HAVE 6 OR MORE DRINKS ON ONE OCCASION: NEVER
AUDIT-C TOTAL SCORE: 0
HOW MANY STANDARD DRINKS CONTAINING ALCOHOL DO YOU HAVE ON A TYPICAL DAY: 0,1 OR 2
HOW OFTEN DO YOU HAVE A DRINK CONTAINING ALCOHOL: NEVER

## 2024-07-04 NOTE — ED INITIAL ASSESSMENT (HPI)
Pt c/o wheezing x3 weeks, worse today when laying down.  States had a tele health visit 6/19/24 and prescribed albuterol inhaler.  Seen at a clinic 4 days ago and told to continue inhalers.  No congestion.  States approximately 24 weeks pregnant.  Denies any hx of asthma.

## 2024-07-04 NOTE — ED PROVIDER NOTES
Patient Seen in: [facility] Emergency Department      History     Chief Complaint   Patient presents with    Breathing Problem     Stated Complaint: SOB; 22 wks fbc wants her seen here    Subjective:   HPI        Objective:   Past Medical History:    Anxiety    Depression    Fibromyalgia    Lupus (HCC)    Neuroendocrine tumor (HCC)    PONV (postoperative nausea and vomiting)    Seizure disorder (HCC)              History reviewed. No pertinent surgical history.             Social History     Socioeconomic History    Marital status: Single   Tobacco Use    Smoking status: Never     Passive exposure: Never    Smokeless tobacco: Never   Vaping Use    Vaping status: Never Used   Substance and Sexual Activity    Alcohol use: Never    Drug use: Never     Social Determinants of Health     Financial Resource Strain: Low Risk  (7/4/2024)    Received from [facility]    Financial Resource Strain     In the past year, have you or any family members you live with been unable to get any of the following when it was really needed? Check all that apply.: None   Recent Concern: Financial Resource Strain - Medium Risk (4/23/2024)    Received from [facility]    Overall Financial Resource Strain (CARDIA)     Difficulty of Paying Living Expenses: Somewhat hard   Food Insecurity: Low Risk  (7/4/2024)    Received from [facility]    Food Insecurity     Within the past 12 months, you worried that your food would run out before you got money to buy more.  : Never true     Within the past 12 months, the food you bought just didn't last and you didn't have money to get more. : Never true   Transportation Needs: Patient Unable To Answer (7/4/2024)    Received from [facility]    Transportation Needs     In the past 12 months, has lack of reliable transportation kept you from medical appointments, meetings, work or from getting things needed for daily living? : Patient unable to  answer   Stress: No Stress Concern Present (9/10/2022)    [facility], [facility]    Citizen of [place] of Occupational Health - Occupational Stress Questionnaire     Feeling of Stress : Not at all    Received from [facility], [facility]    Housing Stability              Review of Systems    Positive for stated Chief Complaint: Breathing Problem    Other systems are as noted in HPI.  Constitutional and vital signs reviewed.      All other systems reviewed and negative except as noted above.    Physical Exam     ED Triage Vitals [07/04/24 1554]   /78   Pulse 107   Resp 16   Temp 98 °F (36.7 °C)   Temp src Temporal   SpO2 96 %   O2 Device None (Room air)       Current Vitals:   Vital Signs  BP: 114/67  Pulse: 95  Resp: 16  Temp: 98 °F (36.7 °C)  Temp src: Temporal  MAP (mmHg): 80    Oxygen Therapy  SpO2: 97 %  O2 Device: None (Room air)            Physical Exam          ED Course   Labs Reviewed - No data to display  EKG    Rate, intervals and axes as noted on EKG Report.  Rate: 92  Rhythm: Sinus Rhythm  Reading: Normal sinus rhythm without signs of acute ischemia or abnormal intervals.                            MDM      23-year-old female history of asthma, lupus, neuroendocrine tumor, seizures, fibromyalgia and who is currently 24 weeks pregnant with a twin gestation presents on referral from immediate care for difficulty breathing.  Per chart review, 3 weeks at least of symptoms, particularly with lying down, coughing, and wheezing.  Patient states that her albuterol inhaler stopped working prompting the visit to the immediate care.  Here, the patient states that she does not want to be evaluated and she feels fine.    Borderline tachycardic with otherwise normal vitals, well-appearing, clear lungs, soft abdomen    Differential: Asthma exacerbation, PE, cardiomyopathy, pneumonia.  Difficult to formulate as patient does not want  to participate in interview.    I had a shared decision-making conversation with the patient.  She is adamant that she does not want to be evaluated at this time.  She understands the risks including death from PE or other emergent pathology.  She was discharged with careful return precautions and with an albuterol inhaler.                               MDM    Disposition and Plan     Clinical Impression:  1. Breathing difficulty         Disposition:  Discharge  7/4/2024  4:32 pm    Follow-up:  No follow-up provider specified.        Medications Prescribed:  Discharge Medication List as of 7/4/2024  4:36 PM

## 2024-07-04 NOTE — DISCHARGE INSTRUCTIONS
Return to the ER if you decide that you do want to be evaluated or if you have worsening symptoms.

## 2024-07-04 NOTE — ED PROVIDER NOTES
Patient Seen in: [facility]    History   CC: shortness of breath  HPI: Kaylie Vance 23 year old female w/ neuroendocrine tumor, fibromyalgia, depression, anxiety, epilepsy on Keppra, lupus and currently pregnant 24wks gestation with twin pregnancy who presents to IC for eval of dyspnea which first began following colonoscopy in February per pt. States however, dyspnea has worsened in the last 3 wks. Feels she sometimes hears wheezing. Denies hx of asthma or reactive airway disease. Did telehealth visit with her PCP office and was given rx for Albuterol without relief per pt. +states chest pain is sometimes present with deep inspiration. Denies recent travel/surgery/ injury/leg swelling bilat. Non-smoker. +24wks gestation with twins. +Grandmother with hx of clotting disorder, unknown type. Denies pregnancy related complaints. Denies URI s/s, fever, rash.     Past Medical History:    Anxiety    Depression    Fibromyalgia    Lupus (HCC)    PONV (postoperative nausea and vomiting)    Seizure disorder (HCC)       No past surgical history on file.    Family History   Problem Relation Age of Onset    Heart Attack Father     Anxiety Mother     Cancer Maternal Grandmother         breast and kidney cancer    Diabetes Maternal Grandmother     Heart Disease Maternal Grandfather     Diabetes Paternal Grandmother     Heart Attack Paternal Grandfather        Social History     Socioeconomic History    Marital status: Single   Tobacco Use    Smoking status: Never     Passive exposure: Never    Smokeless tobacco: Never   Vaping Use    Vaping status: Never Used   Substance and Sexual Activity    Alcohol use: Never    Drug use: Never     Social Determinants of Health     Financial Resource Strain: Low Risk  (7/4/2024)    Received from [facility]    Financial Resource Strain     In the past year, have you or any family members you live with been unable to get any of the following when it was  really needed? Check all that apply.: None   Recent Concern: Financial Resource Strain - Medium Risk (4/23/2024)    Received from [facility]    Overall Financial Resource Strain (CARDIA)     Difficulty of Paying Living Expenses: Somewhat hard   Food Insecurity: Low Risk  (7/4/2024)    Received from [facility]    Food Insecurity     Within the past 12 months, you worried that your food would run out before you got money to buy more.  : Never true     Within the past 12 months, the food you bought just didn't last and you didn't have money to get more. : Never true   Transportation Needs: Patient Unable To Answer (7/4/2024)    Received from [facility]    Transportation Needs     In the past 12 months, has lack of reliable transportation kept you from medical appointments, meetings, work or from getting things needed for daily living? : Patient unable to answer   Stress: No Stress Concern Present (9/10/2022)    Received from [facility], [facility] of Occupational Health - Occupational Stress Questionnaire     Feeling of Stress : Not at all    Received from [facility], [facility]    Housing Stability       ROS:  Review of Systems    Positive for stated complaint: cough,sob,wheezing  Other systems are as noted in HPI.  Constitutional and vital signs reviewed.      All other systems reviewed and negative except as noted above.    PSFH elements reviewed from today and agreed except as otherwise stated in HPI.             Constitutional and vital signs reviewed.        Physical Exam     ED Triage Vitals [07/04/24 1524]   BP 95/81   Pulse 103   Resp 18   Temp 97.8 °F (36.6 °C)   Temp src Temporal   SpO2 97 %   O2 Device None (Room air)       Current:BP 95/81   Pulse 103   Temp 97.8 °F (36.6 °C) (Temporal)   Resp 18   LMP 01/28/2024 (Approximate)   SpO2 97%         PE:  General - Appears  well, non-toxic and in NAD  Head - Appears symmetrical without deformity/swelling cranium, scalp, or facial bones  Eyes - sclera not injected, no discharge noted, no periorbital edema  ENT - EAC bilaterally without discharge, TM pearly grey with COL visualized appropriately bilaterally.   no nasal drainage noted in nares bilat, no cobblestoning to post. Pharynx.   Oropharynx clear, posterior pharynx is without erythema and without tonsilar enlargement or exudate, uvula midline, +gag, voice is clear. No trismus  Neck - supple with trachea midline  Resp - Lung sounds diminished to bilateral bases however wob unlabored, even expansion bilaterally   CV - RRR  Skin - no rashes or petechiae noted, pink warm and dry throughout, mmm, cap refill <2seconds  Neuro - A&O x4, steady gait  MSK - makes purposeful movements of all extremities, radial pulses 2+ bilat.  Psych - Interactive and appropriate      ED Course   Labs Reviewed - No data to display    MDM     DDx: PE, bronchospasm, pneumonia,     Patient with insidious in onset however progressively worsening dyspnea over the last handful of months.  Unrelieved with albuterol.  She is 24 weeks gestation based on LMP with twin pregnancy.  History of neuroendocrine tumor for which she is followed by oncology at [facility].  Discussed EKG, chest x-ray and lab work including D-dimer at immediate care which patient declines.  States if she is going to need blood work, she would rather go to the emergency room as she is a very tough stick.  Patient reassured the immediate care is able to obtain blood work and could potentially spare pt a visit to the ED, however patient is again insisting she would rather go to the emergency room for any potential workup.  Vital signs stable, borderline tachycardia.  Will be driven to [facility] emergency room, and go directly from the immediate care.  Advised n.p.o. until otherwise instructed by ED provider.  Patient is historian  and demonstrates understanding of all instruction and agrees with plan of care. This case was also discussed with Dr. Lemus who agrees with plan of care.    Disposition and Plan     Clinical Impression:  1. Dyspnea, unspecified type    2. 24 weeks gestation of pregnancy (HCC)        Disposition:  Ic to ed    Follow-up:  No follow-up provider specified.    Medications Prescribed:  Current Discharge Medication List

## 2024-07-04 NOTE — ED INITIAL ASSESSMENT (HPI)
Patient presents with shortness of breath while laying down x 3 weeks.  Hx of asthma. Notes past few weeks she has had wheezing and coughing, saw primary and given inhaler.    Patient is 24 weeks pregnant.   Patient to be seen here then to [facility], FHT to be done in ED.

## 2024-07-04 NOTE — ED QUICK NOTES
Pt declines work up for PE at . Pt requesting to go to ER. Pt transferred by provider to [facility] ER via private car by self.

## 2024-07-05 LAB
ATRIAL RATE: 92 BPM
P AXIS: 69 DEGREES
P-R INTERVAL: 144 MS
Q-T INTERVAL: 344 MS
QRS DURATION: 74 MS
QTC CALCULATION (BEZET): 425 MS
R AXIS: 27 DEGREES
T AXIS: 52 DEGREES
VENTRICULAR RATE: 92 BPM

## 2024-07-08 ENCOUNTER — HOSPITAL ENCOUNTER (EMERGENCY)
Facility: HOSPITAL | Age: 24
Discharge: HOME | End: 2024-07-08
Attending: EMERGENCY MEDICINE
Payer: COMMERCIAL

## 2024-07-08 ENCOUNTER — HOSPITAL ENCOUNTER (OUTPATIENT)
Facility: HOSPITAL | Age: 24
Discharge: HOME | End: 2024-07-08
Attending: OBSTETRICS & GYNECOLOGY | Admitting: OBSTETRICS & GYNECOLOGY
Payer: COMMERCIAL

## 2024-07-08 VITALS
HEART RATE: 79 BPM | HEIGHT: 62 IN | BODY MASS INDEX: 35.88 KG/M2 | DIASTOLIC BLOOD PRESSURE: 59 MMHG | RESPIRATION RATE: 22 BRPM | OXYGEN SATURATION: 99 % | SYSTOLIC BLOOD PRESSURE: 146 MMHG | WEIGHT: 195 LBS | TEMPERATURE: 98 F

## 2024-07-08 VITALS
DIASTOLIC BLOOD PRESSURE: 43 MMHG | SYSTOLIC BLOOD PRESSURE: 117 MMHG | OXYGEN SATURATION: 100 % | TEMPERATURE: 98 F | HEART RATE: 82 BPM | RESPIRATION RATE: 17 BRPM

## 2024-07-08 DIAGNOSIS — G40.909 SEIZURE DISORDER (HCC): Primary | ICD-10-CM

## 2024-07-08 LAB
ANTIBODY SCREEN: NEGATIVE
B-HCG SERPL-ACNC: <2.6 MIU/ML
BASOPHILS # BLD AUTO: 0.02 X10(3) UL (ref 0–0.2)
BASOPHILS NFR BLD AUTO: 0.6 %
DEPRECATED RDW RBC AUTO: 46.1 FL (ref 35.1–46.3)
EOSINOPHIL # BLD AUTO: 0.13 X10(3) UL (ref 0–0.7)
EOSINOPHIL NFR BLD AUTO: 3.7 %
ERYTHROCYTE [DISTWIDTH] IN BLOOD BY AUTOMATED COUNT: 15.1 % (ref 11–15)
HCT VFR BLD AUTO: 30.7 %
HGB BLD-MCNC: 10 G/DL
IMM GRANULOCYTES # BLD AUTO: 0.01 X10(3) UL (ref 0–1)
IMM GRANULOCYTES NFR BLD: 0.3 %
LYMPHOCYTES # BLD AUTO: 1.2 X10(3) UL (ref 1–4)
LYMPHOCYTES NFR BLD AUTO: 34.3 %
MCH RBC QN AUTO: 27 PG (ref 26–34)
MCHC RBC AUTO-ENTMCNC: 32.6 G/DL (ref 31–37)
MCV RBC AUTO: 82.7 FL
MONOCYTES # BLD AUTO: 0.21 X10(3) UL (ref 0.1–1)
MONOCYTES NFR BLD AUTO: 6 %
NEUTROPHILS # BLD AUTO: 1.93 X10 (3) UL (ref 1.5–7.7)
NEUTROPHILS # BLD AUTO: 1.93 X10(3) UL (ref 1.5–7.7)
NEUTROPHILS NFR BLD AUTO: 55.1 %
PLATELET # BLD AUTO: 238 10(3)UL (ref 150–450)
RBC # BLD AUTO: 3.71 X10(6)UL
RH BLOOD TYPE: POSITIVE
WBC # BLD AUTO: 3.5 X10(3) UL (ref 4–11)

## 2024-07-08 PROCEDURE — 96375 TX/PRO/DX INJ NEW DRUG ADDON: CPT

## 2024-07-08 PROCEDURE — 96374 THER/PROPH/DIAG INJ IV PUSH: CPT

## 2024-07-08 PROCEDURE — 59025 FETAL NON-STRESS TEST: CPT | Performed by: OBSTETRICS & GYNECOLOGY

## 2024-07-08 PROCEDURE — 99284 EMERGENCY DEPT VISIT MOD MDM: CPT

## 2024-07-08 PROCEDURE — 99285 EMERGENCY DEPT VISIT HI MDM: CPT

## 2024-07-08 RX ORDER — KETOROLAC TROMETHAMINE 15 MG/ML
15 INJECTION, SOLUTION INTRAMUSCULAR; INTRAVENOUS ONCE
Status: COMPLETED | OUTPATIENT
Start: 2024-07-08 | End: 2024-07-08

## 2024-07-08 RX ORDER — LEVETIRACETAM 500 MG/5ML
1000 INJECTION, SOLUTION, CONCENTRATE INTRAVENOUS ONCE
Status: COMPLETED | OUTPATIENT
Start: 2024-07-08 | End: 2024-07-08
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
## 2024-07-08 NOTE — ED INITIAL ASSESSMENT (HPI)
Patient brought down from [facility] for further eval. Patient reports seizure activity today, reported pregnancy. Per [facility], pregnancy workup negative.

## 2024-07-08 NOTE — PROGRESS NOTES
Received call light from pt stating \"seizure\". [place] pt call for staff was sitting outside room, walked in and pt sitting on floor to the right of the bed with call light in hand stating that she got up to use the bathroom and woke up on the floor. Pt assisted to bed, VS WNL. Pt alert and oriented. States that she had another spell, that she is \"tired of this happening.\"

## 2024-07-08 NOTE — TRIAGE
[facility]  part of [facility]      Triage Note    Kaylie Tio Vance Patient Status:  Outpatient    11/3/2000 MRN [id number]   Location [facility] Attending No att. providers found   Hosp Day # 0 PCP No primary care provider on file.      Para:   Estimated Date of Delivery: 10/23/24  Gestation: 24w5d    Chief Complaint    Vaginal Bleeding         Allergies:    Allergies   Allergen Reactions    Amoxicillin HIVES    Penicillins DIARRHEA, HIVES, NAUSEA ONLY and RASH     Other reaction(s): GI Symptoms, Hives   Other reaction(s): GI Symptoms   Other reaction(s): Hives    Sulfa Antibiotics DIARRHEA, RASH, HIVES and UNKNOWN     Other reaction(s): Unknown    Tomato HIVES, NAUSEA ONLY and NAUSEA AND VOMITING    Wandy Protect RASH    Dimethicone-Zinc Oxide RASH    Zinc Oxide DIARRHEA and RASH       Orders Placed This Encounter   Procedures    CBC With Differential With Platelet    HCG, Beta Subunit (Quant Pregnancy Test)    Type and screen    ABORH (Blood Type)    Antibody Screen       Lab Results   Component Value Date    WBC 3.5 (L) 2024    HGB 10.0 (L) 2024    HCT 30.7 (L) 2024    .0 2024    CREATSERUM 0.63 2024    BUN 5 (L) 2024     2024    K 3.9 2024     2024    CO2 27.0 2024    GLU 97 2024    CA 9.3 2024    ALB 4.4 2024    ALKPHO 72 2024    BILT 1.3 (H) 2024    TP 7.4 2024    AST 12 2024    ALT <7 (L) 2024    T4F 1.1 2024    TSH 0.749 2024    LIP 35 2024    ESRML 24 (H) 2023    TROPHS 3 2023    B12 218 2023       Clinitek UA  Lab Results   Component Value Date    URCOLOR Dark yellow 2023    URCLA Slightly cloudy (A) 2023    GLUUR Normal 2024    URINEBILI Negative 2023    URINEKETONE Negative 2023    SPECGRAVITY 1.023 2024    PHUR 6.5 2023     PROTURINE 30 (A) 08/30/2023    UROBILI <2.0 08/30/2023    URINENITRITE Negative 08/30/2023    URINELEUK Small (A) 08/30/2023    URINECUL No Growth at 18-24 hrs. 07/07/2023       UA  Lab Results   Component Value Date    COLORUR Yellow 02/16/2024    CLARITY Clear 02/16/2024    SPECGRAVITY 1.023 02/16/2024    PROUR 30 (A) 02/16/2024    GLUUR Normal 02/16/2024    KETUR Negative 02/16/2024    BILUR Negative 02/16/2024    BLOODURINE Negative 02/16/2024    NITRITE Negative 02/16/2024    UROBILINOGEN 2 (A) 02/16/2024    LEUUR Negative 02/16/2024       Vitals:    07/08/24 1108 07/08/24 1110 07/08/24 1115 07/08/24 1120   BP: 117/43      Pulse: 79 66 74 82   Resp:       Temp:       TempSrc:       SpO2:  100% 100% 100%       NST                                                                                                                                                         Additional Comments       Chief Complaint   Patient presents with    Vaginal Bleeding     Pt brought in via EMS with complaint of vaginal bleeding. States she is 23 weeks gestation getting care at [facility].         Sumi CASTORENA RN  7/8/2024 12:00 PM

## 2024-07-08 NOTE — PROGRESS NOTES
Pt is a 23 year old female admitted to TR1/TR1-A.     Chief Complaint   Patient presents with    Vaginal Bleeding     Pt brought in via EMS with complaint of vaginal bleeding. States she is 23 weeks gestation getting care at [facility].      Pt is  24w5d intra-uterine pregnancy.  History obtained, consents signed. Oriented to room, staff, and plan of care.
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
## 2024-07-08 NOTE — PROGRESS NOTES
Pt presents with complaint of abdominal pain and vaginal bleeding that started this morning after \"seizure episode\" Dr. Carrington at bedside to evaluate pt. Bedside ultrasound performed by Dr. Carrington. Per Dr. Carrington pregnancy not consistent with weeks gestation pt is describing. Sterile speculum exam done. No bleeding noted on exam performed by Dr. Carrington. Red stain noted on pts hands and feet as well as perineum area. Pt informed Dr. Carrington it was paint. Patients lab results discussed with pt by Dr. Carrington. Recommends evaluation in ER as well as follow up with neurologist for seizures and OB/Gyn. Pt brought to ER via wheelchair in stable condition.

## 2024-07-09 NOTE — ED PROVIDER NOTES
Patient Seen in: [facility] Emergency Department    History   No chief complaint on file.    Stated Complaint: seizure    HPI    Patient presents with seizure like activity.  Reports she has had a couple seizures past few days.    Then called 911 due to vaginal bleeding reports she thought she was 24 weeks pregnant.  Had + pregnancy test months ago has been unable to follow up  Context: no recent illness, denies alcohol/drug use, + sleep deprivation, reports she is taking seizure medication    She was taken to [facility] and had negative quant and nl speculum exam.  Patient does suffer from anxiety and depression.   Admits to being more depressed lately, denies SI.  Injury: denies  Character: unclear  Post ictal symptoms: unclear    Past Medical History:    Anxiety    Depression    Fibromyalgia    Lupus (HCC)    Neuroendocrine tumor (HCC)    PONV (postoperative nausea and vomiting)    Seizure disorder (HCC)       History reviewed. No pertinent surgical history.         Family History   Problem Relation Age of Onset    Heart Attack Father     Anxiety Mother     Cancer Maternal Grandmother         breast and kidney cancer    Diabetes Maternal Grandmother     Heart Disease Maternal Grandfather     Diabetes Paternal Grandmother     Heart Attack Paternal Grandfather        Social History     Socioeconomic History    Marital status: Single   Tobacco Use    Smoking status: Never     Passive exposure: Never    Smokeless tobacco: Never   Vaping Use    Vaping status: Never Used   Substance and Sexual Activity    Alcohol use: Never    Drug use: Never     Social Determinants of Health     Financial Resource Strain: Medium Risk (7/8/2024)    Financial Resource Strain     Difficulty of Paying Living Expenses: Hard     Med Affordability: No   Food Insecurity: No Food Insecurity (7/8/2024)    Food Insecurity     Food Insecurity: Never true   Transportation Needs: Unmet Transportation Needs (7/8/2024)    Transportation Needs      Lack of Transportation: Yes   Physical Activity: Unknown (9/10/2022)    Received from [facility] [email or web address] [facility], [facility]    Exercise Vital Sign     Days of Exercise per Week: 3 days   Stress: Stress Concern Present (7/8/2024)    Stress     Feeling of Stress : Yes   Social Connections: Low Risk  (7/4/2024)    Received from [facility]    Social Connections     How often do you see or talk to people that you care about and feel close to? (For example: talking to friends on the phone, visiting friends or family, going to [employer] or club meetings): 5 or more times a week   Housing Stability: Medium Risk (7/8/2024)    Housing Stability     Housing Instability: No     Crib or Bassinette: No       Review of Systems    Positive for stated complaint: seizure  Other systems are as noted in HPI.  Constitutional and vital signs reviewed.      All other systems reviewed and negative except as noted above.    PSFH elements reviewed from today and agreed except as otherwise stated in HPI.    Physical Exam     ED Triage Vitals [07/08/24 1153]   /54   Pulse 87   Resp 18   Temp 98 °F (36.7 °C)   Temp src    SpO2 100 %   O2 Device        Current:/59   Pulse 79   Temp 98 °F (36.7 °C)   Resp 22   Ht 157.5 cm (5' 2\")   Wt 88.5 kg   LMP  (LMP Unknown)   SpO2 99%   Breastfeeding Unknown   BMI 35.67 kg/m²   PULSE OX nl  GENERAL: awake alert  HEAD: normocephalic, atraumatic,   EYES: PERRLA, EOMI,  THROAT: mm dry,   NECK: supple, no meningeal signs  LUNGS: no resp distress, cta bilateral  CARDIO: RRR without murmur  GI: abdomen is soft and non tender, no masses, nl bowel sounds   EXTREMITIES: from, 5/5 strength in all 4 ext, no edema  NEURO: alert and oiented *3, 2-12 intact, no focal deficit noted  SKIN: good skin turgor, no  rashes, red dye or pain on feet and hands  PSYCH: calm, cooperative,    ED Course   Labs Reviewed - No data to display    MDM     Monitor Interpretation:  Nsr 78  Medical  Decision Making  Problems Addressed:  Seizure disorder (HCC): acute illness or injury    Amount and/or Complexity of Data Reviewed  Independent Historian: EMS  External Data Reviewed: labs and radiology.     Details: Has had several CT scans brain and MRI brain past year for similar presentations of seizure vs. Syncope, no evidence of tumor on abd CT as patient reported.  Discussion of management or test interpretation with external provider(s): Spoke with neuro will increase keppra, no driving fu has EEG scheduled.    Risk  Prescription drug management.          Disposition and Plan     Clinical Impression:  1. Seizure disorder (HCC)        Disposition:  Discharge    Follow-up:  Gregoria Santos,   [address] [address]  [phone]    Follow up        Medications Prescribed:  Discharge Medication List as of 7/8/2024  1:37 PM

## 2024-07-19 ENCOUNTER — TELEPHONE (OUTPATIENT)
Dept: FAMILY MEDICINE CLINIC | Facility: CLINIC | Age: 24
End: 2024-07-19

## 2024-07-19 ENCOUNTER — OFFICE VISIT (OUTPATIENT)
Dept: SURGERY | Facility: CLINIC | Age: 24
End: 2024-07-19
Payer: COMMERCIAL

## 2024-07-19 ENCOUNTER — LAB ENCOUNTER (OUTPATIENT)
Dept: LAB | Facility: HOSPITAL | Age: 24
End: 2024-07-19
Attending: NURSE PRACTITIONER
Payer: COMMERCIAL

## 2024-07-19 ENCOUNTER — OFFICE VISIT (OUTPATIENT)
Dept: FAMILY MEDICINE CLINIC | Facility: CLINIC | Age: 24
End: 2024-07-19

## 2024-07-19 VITALS
WEIGHT: 191 LBS | BODY MASS INDEX: 34.71 KG/M2 | HEART RATE: 79 BPM | DIASTOLIC BLOOD PRESSURE: 64 MMHG | SYSTOLIC BLOOD PRESSURE: 107 MMHG | HEIGHT: 62.25 IN

## 2024-07-19 VITALS
HEIGHT: 63.8 IN | OXYGEN SATURATION: 98 % | SYSTOLIC BLOOD PRESSURE: 102 MMHG | DIASTOLIC BLOOD PRESSURE: 80 MMHG | HEART RATE: 78 BPM | BODY MASS INDEX: 33.36 KG/M2 | WEIGHT: 193 LBS

## 2024-07-19 DIAGNOSIS — R63.2 INCREASED APPETITE: ICD-10-CM

## 2024-07-19 DIAGNOSIS — K59.00 CONSTIPATION, UNSPECIFIED CONSTIPATION TYPE: ICD-10-CM

## 2024-07-19 DIAGNOSIS — Z51.81 ENCOUNTER FOR THERAPEUTIC DRUG MONITORING: Primary | ICD-10-CM

## 2024-07-19 DIAGNOSIS — G40.309 GENERALIZED EPILEPSY (HCC): ICD-10-CM

## 2024-07-19 DIAGNOSIS — E87.6 HYPOKALEMIA: ICD-10-CM

## 2024-07-19 DIAGNOSIS — Z87.39 HISTORY OF JUVENILE RHEUMATOID ARTHRITIS: ICD-10-CM

## 2024-07-19 DIAGNOSIS — M32.9 LUPUS (HCC): ICD-10-CM

## 2024-07-19 DIAGNOSIS — D64.9 NORMOCYTIC ANEMIA: ICD-10-CM

## 2024-07-19 DIAGNOSIS — M54.9 CHRONIC BACK PAIN, UNSPECIFIED BACK LOCATION, UNSPECIFIED BACK PAIN LATERALITY: ICD-10-CM

## 2024-07-19 DIAGNOSIS — E66.9 OBESITY (BMI 30-39.9): ICD-10-CM

## 2024-07-19 DIAGNOSIS — E88.819 INSULIN RESISTANCE: ICD-10-CM

## 2024-07-19 DIAGNOSIS — O03.9 SPONTANEOUS ABORTION (HCC): Primary | ICD-10-CM

## 2024-07-19 DIAGNOSIS — G89.29 CHRONIC BACK PAIN, UNSPECIFIED BACK LOCATION, UNSPECIFIED BACK PAIN LATERALITY: ICD-10-CM

## 2024-07-19 DIAGNOSIS — R55 SYNCOPE AND COLLAPSE: ICD-10-CM

## 2024-07-19 DIAGNOSIS — N96 HISTORY OF MULTIPLE SPONTANEOUS ABORTIONS: ICD-10-CM

## 2024-07-19 DIAGNOSIS — D3A.8 NEUROENDOCRINE TUMOR (HCC): ICD-10-CM

## 2024-07-19 LAB
ANION GAP SERPL CALC-SCNC: 8 MMOL/L (ref 0–18)
BUN BLD-MCNC: 6 MG/DL (ref 9–23)
BUN/CREAT SERPL: 9.5 (ref 10–20)
CALCIUM BLD-MCNC: 9.3 MG/DL (ref 8.7–10.4)
CHLORIDE SERPL-SCNC: 109 MMOL/L (ref 98–112)
CO2 SERPL-SCNC: 26 MMOL/L (ref 21–32)
CREAT BLD-MCNC: 0.63 MG/DL
EGFRCR SERPLBLD CKD-EPI 2021: 128 ML/MIN/1.73M2 (ref 60–?)
FASTING STATUS PATIENT QL REPORTED: NO
GLUCOSE BLD-MCNC: 65 MG/DL (ref 70–99)
OSMOLALITY SERPL CALC.SUM OF ELEC: 292 MOSM/KG (ref 275–295)
POTASSIUM SERPL-SCNC: 3.6 MMOL/L (ref 3.5–5.1)
SODIUM SERPL-SCNC: 143 MMOL/L (ref 136–145)

## 2024-07-19 PROCEDURE — 99215 OFFICE O/P EST HI 40 MIN: CPT | Performed by: NURSE PRACTITIONER

## 2024-07-19 PROCEDURE — 3074F SYST BP LT 130 MM HG: CPT | Performed by: NURSE PRACTITIONER

## 2024-07-19 PROCEDURE — 3078F DIAST BP <80 MM HG: CPT | Performed by: NURSE PRACTITIONER

## 2024-07-19 PROCEDURE — 80048 BASIC METABOLIC PNL TOTAL CA: CPT

## 2024-07-19 PROCEDURE — 99213 OFFICE O/P EST LOW 20 MIN: CPT | Performed by: NURSE PRACTITIONER

## 2024-07-19 PROCEDURE — 3079F DIAST BP 80-89 MM HG: CPT | Performed by: NURSE PRACTITIONER

## 2024-07-19 PROCEDURE — 36415 COLL VENOUS BLD VENIPUNCTURE: CPT

## 2024-07-19 PROCEDURE — 3008F BODY MASS INDEX DOCD: CPT | Performed by: NURSE PRACTITIONER

## 2024-07-19 RX ORDER — METFORMIN HYDROCHLORIDE 500 MG/1
500 TABLET, EXTENDED RELEASE ORAL DAILY
Qty: 30 TABLET | Refills: 3 | Status: SHIPPED | OUTPATIENT
Start: 2024-07-19

## 2024-07-19 NOTE — TELEPHONE ENCOUNTER
Family Medical Leave Act received in Forms Dept, logged for processing.  Method of disclosure indicates FORM TO BE PICKED UP IN OFFICE

## 2024-07-19 NOTE — PROGRESS NOTES
[facility], [facility], [place]  [address] [facility] [address] [place] [id number]  Dept: [phone]       Patient:  Kaylie Vance  :      11/3/2000  MRN:      [id number]    Chief Complaint:    Chief Complaint   Patient presents with    Follow - Up    Weight Management    Obesity       SUBJECTIVE     History of Present Illness:  Kaylie is being seen today for a follow-up for weight management.     Stopped phentermine September due to death in the family.   Resumed 2023.   Last dose 2 months ago due to constipation.    lbs.   Has experienced weight gain.     Initial HPI:  21 yo female.   Presents to clinic for assistance with weight loss/maintenance.   Took phentermine 2020-2020. Online clinic.  Lost 45 lbs. Regained weight.   Now pescetarian 3 months.   Hx recurrent miscarriages. In pelvic floor therapy.    On sertraline.     Patient is considering medications and is not a candidate for bariatric surgery for weight loss.  Patient denies any history of eating disorder(s).    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 Event planning business.   Patient lives with self.    Patient's goal weight:  Biggest weight loss in the past:  45 lbs; 10 lbs   How weight loss was achieved: 45 lbs phentermine, diet & exercise changes, 10 lbs diet & exercise changes   Heaviest weight ever:  Previous use of medical weight loss medications:    Physical activity: Boxing 3 days/week     Sleep: inadequate hours/night    Sleep screening:     Past Medical History:   Past Medical History:    Anxiety    Depression    Fibromyalgia    Lupus (HCC)    Neuroendocrine tumor (HCC)    PONV (postoperative nausea and vomiting)    Seizure disorder (HCC)        Comorbidities:      OBJECTIVE     Vitals: /80 (BP Location: Right arm, Patient Position: Sitting, Cuff Size: adult)   Pulse 78   Ht 5' 3.8\" (1.621 m)   Wt 193 lb (87.5 kg)    LMP 01/28/2024 (Approximate)   SpO2 98%   BMI 33.34 kg/m²     Initial weight loss: -01   Total weight loss:  +06   Start weight: 186    Wt Readings from Last 6 Encounters:   07/19/24 193 lb (87.5 kg)   07/19/24 191 lb (86.6 kg)   07/08/24 195 lb (88.5 kg)   07/04/24 195 lb (88.5 kg)   06/28/24 195 lb (88.5 kg)   06/28/24 195 lb (88.5 kg)       Patient Medications:    Current Outpatient Medications   Medication Sig Dispense Refill    metFORMIN  MG Oral Tablet 24 Hr Take 1 tablet (500 mg total) by mouth daily. 30 tablet 3    albuterol 108 (90 Base) MCG/ACT Inhalation Aero Soln Inhale 2 puffs into the lungs every 4 (four) hours as needed for Wheezing or Shortness of Breath. (Patient not taking: Reported on 7/19/2024) 18 g 5    Spacer/Aero-Holding Chambers Does not apply Device Use with hfa inhaler as directed (Patient not taking: Reported on 7/19/2024) 1 each 0    Budesonide (PULMICORT FLEXHALER) 90 MCG/ACT Inhalation Aerosol Powder, Breath Activated Inhale 2 puffs into the lungs 2 (two) times daily. (Patient not taking: Reported on 7/4/2024) 3 each 5    levetiracetam 500 MG Oral Tab Take 1 tablet (500 mg total) by mouth 2 (two) times daily. 180 tablet 0    Ferrous Sulfate 325 (65 Fe) MG Oral Tab Take 1 tablet (325 mg total) by mouth daily with breakfast. 30 tablet 1    Potassium Chloride ER 10 MEQ Oral Tab CR Take 1 tablet (10 mEq total) by mouth daily. 30 tablet 0    Cholecalciferol 50 MCG (2000 UT) Oral Tab Take 1 tablet (2,000 Units total) by mouth As Directed.      Cyanocobalamin 2500 MCG Oral Tab Take by mouth As Directed. (Patient not taking: Reported on 7/19/2024)      folic acid 400 MCG Oral Tab Take 1 tablet (400 mcg total) by mouth As Directed.       Allergies:  Amoxicillin, Penicillins, Sulfa antibiotics, Tomato, Wandy protect, Dimethicone-zinc oxide, and Zinc oxide     Social History:  Reviewed    Surgical History:  History reviewed. No pertinent surgical history.  Family History:    Family  History   Problem Relation Age of Onset    Heart Attack Father     Anxiety Mother     Cancer Maternal Grandmother         breast and kidney cancer    Diabetes Maternal Grandmother     Heart Disease Maternal Grandfather     Diabetes Paternal Grandmother     Heart Attack Paternal Grandfather      Initial Intake:  After dinner behavior: + ice cream daily   Night eating: -  Portion sizes: -  Binge: -  Emotional: stress   Depression: + Score: 5 on sertraline   Grazing: -  Sweet tooth: +  Crunchy/salty: +  Etoh: Never   Soda Drinker: Yes                 If yes, how much?:  sprite occasional   Sports Drinks:  Yes               If yes, how much?:  Gatorade   Juice:  Yes                 If yes, how much?:  strawberry lemonade   Number of restaurant or fast food meals/week:  Rare meals/week, too expensive      Food Journal  Reviewed and Discussed:       Patient has a Food Journal?: no   Patient is reading nutrition labels?  yes  Average Caloric Intake:     Average CHO Intake:   Is patient exercising? yes  Type of exercise? 2 days/week   Dance class, boxing     Eating Habits  Patient states the following:  Eats 3 meal(s) per day  Length of time it takes to consume a meal:    # of snacks per day:  none  Type of snacks:    Amount of soda consumption per day:  None   Amount of water (in ounces) per day:  Improving   Toughest challenge:  staying consistent, health issues-siezures    3 meals/day  B: smoothie (mixed berries, banana, kale)   L: tuna/cod/shrimp, italian dressing   D: fish, rice, vegetables    S: apples, simply lemonade     Nutritional Goals  Eat 3-4 cups of fresh fruits or vegetables daily    Behavior Modifications Reviewed and Discussed  Eat breakfast, Eat 3 meals per day, Plan meals in advance, Read nutrition labels, Drink 64 oz of water per day, Maintain a daily food journal, Utlize portion control strategies to reduce calorie intake, Identify triggers for eating and manage cues, and Eat slowly and take 20 to 30  minutes to complete each meal    Exercise Goals Reviewed and Discussed    Aim for 150 minutes moderate level exercise weekly with 2-3 days strength training as tolerated     ROS:    Constitutional: negative  Respiratory: negative  Cardiovascular: negative  Gastrointestinal: positive for constipation  Integument/breast: negative  Hematologic/lymphatic: negative  Musculoskeletal:positive for back pain scoliosis   Neurological: negative  Behavioral/Psych: positive for depression  Endocrine: negative  All other systems were reviewed and are negative    Physical Exam:  General appearance: alert, appears stated age, cooperative and obese  Head: Normocephalic, without obvious abnormality, atraumatic  Neck: no adenopathy, no carotid bruit, no JVD, supple, symmetrical, trachea midline and thyroid not enlarged, symmetric, no tenderness/mass/nodules  Lungs: clear to auscultation bilaterally  Heart: S1, S2 normal, no murmur, click, rub or gallop, regular rate and rhythm  Abdomen: soft, non-tender; bowel sounds normal; no masses,  no organomegaly and abdomen obese   Extremities: intact, no edema   Pulses: 2+ and symmetric  Skin: intact   Neurologic: Grossly normal    ASSESSMENT     Encounter Diagnosis(ses):   Encounter Diagnoses   Name Primary?    Encounter for therapeutic drug monitoring Yes    Constipation, unspecified constipation type     Obesity (BMI 30-39.9)     Chronic back pain, unspecified back location, unspecified back pain laterality     Increased appetite     Insulin resistance      PLAN     Diagnoses and all orders for this visit:    Encounter for therapeutic drug monitoring    Constipation, unspecified constipation type    Obesity (BMI 30-39.9)  -     metFORMIN  MG Oral Tablet 24 Hr; Take 1 tablet (500 mg total) by mouth daily.    Chronic back pain, unspecified back location, unspecified back pain laterality    Increased appetite    Insulin resistance  -     metFORMIN  MG Oral Tablet 24 Hr; Take 1  tablet (500 mg total) by mouth daily.      OBESITY/WEIGHT GAIN:     Recommended patient continue intensive lifestyle and behavioral modifications at this time for weight loss.     Educated patient on lifestyle modifications: Whole Food/Plant Strong/Low Glycemic Index diet, moderate alcohol consumption, reduced sodium intake to no more than 2,400 mg/day, and at least 150 minutes of moderate physical activity per week.    Avoid processed, poor quality carbohydrates, refined grains, flour, sugar.     Goals for next month:  1. Keep a food log.   2. Drink 64 ounces of non-caloric beverages per day. No fruit juices or regular soda.  3. Aim for 150 minutes moderate exercise per week.    4. Increase fruit and vegetable servings to 5-6 per day.    5. Improve sleep and stress.      Reviewed labs:   9/12/22- CBC in range. Elevated . CMP otherwise in range.   3/29/23- Thyroid studies in range. Low hemoglobin.  10/10/23- . Hemoglobin low. CMP otherwise ok.   11/6/2023- FBS elevated. BMP otherwise in range. Low hemoglobin- supplement.   12/1/23- Vitamin D low 10.3- continue weekly supplement.   7/8/24- Low Hgb otherwise CBC in range.   4/5/24- Insulin elevated.  a1c 4.8.   Thyroid studies in range.     Denies hx cardiac disease or substance abuse.  Denies hx renal stones.     Denies personal or family hx medullary thyroid CA, endocrine neoplasia syndrome, pancreatitis hx, suicidal ideation. No renal impairment, severe GI disease, diabetes, pancreatitis risks noted.    Newly diagnosed seizures- avoid Contrave/Wellbutrin.   Last seizure one week ago.    No Zepbound coverage.    Has taken phentermine in the past.  Weight gain on phentermine 15 mg by mouth in the AM.  Avoid higher doses-severe constipation.   Has taken metformin in the past.     Restart metformin 500 mg in the AM with breakfast.   Start medication after surgical procedure 8/6/24.    Alternative: topiramate- will need neurology approval.     SQ  administration teaching provided to patient.   Discussed risks, benefits, and side effects of medication. Patient instructed to avoid pregnancy during use.     EKG done 07/2024.     s/p colonoscopy- neuroendocrine tumor- planning removal 8/6/24.       Completed pelvic floor therapy- recurrent miscarriage.     Meet with RD.     Goal: 150 lbs.      RTC 4-6 months.      REJI Chandler
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
## 2024-07-19 NOTE — PROGRESS NOTES
[facility], [facility], [place]  [address] [facility] [address] [place] [id number]  Dept: [phone]       Patient:  Kaylie Vance  :      11/3/2000  MRN:      [id number]    Chief Complaint:    Chief Complaint   Patient presents with    Follow - Up    Weight Management    Obesity       SUBJECTIVE     History of Present Illness:  Kaylie is being seen today for a follow-up for weight management.     Stopped phentermine September due to death in the family.   Resumed 2023.   Last dose 2 months ago due to constipation.    lbs.   Has experienced weight gain.     Initial HPI:  23 yo female.   Presents to clinic for assistance with weight loss/maintenance.   Took phentermine 2020-2020. Online clinic.  Lost 45 lbs. Regained weight.   Now pescetarian 3 months.   Hx recurrent miscarriages. In pelvic floor therapy.    On sertraline.     Patient is considering medications and is not a candidate for bariatric surgery for weight loss.  Patient denies any history of eating disorder(s).    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 Event planning business.   Patient lives with self.    Patient's goal weight:  Biggest weight loss in the past:  45 lbs; 10 lbs   How weight loss was achieved: 45 lbs phentermine, diet & exercise changes, 10 lbs diet & exercise changes   Heaviest weight ever:  Previous use of medical weight loss medications:    Physical activity: Boxing 3 days/week     Sleep: inadequate hours/night    Sleep screening:     Past Medical History:   Past Medical History:    Anxiety    Depression    Fibromyalgia    Lupus (HCC)    Neuroendocrine tumor (HCC)    PONV (postoperative nausea and vomiting)    Seizure disorder (HCC)        Comorbidities:      OBJECTIVE     Vitals: /80 (BP Location: Right arm, Patient Position: Sitting, Cuff Size: adult)   Pulse 78   Ht 5' 3.8\" (1.621 m)   Wt 193 lb (87.5 kg)    LMP 01/28/2024 (Approximate)   SpO2 98%   BMI 33.34 kg/m²     Initial weight loss: -01   Total weight loss:  +06   Start weight: 186    Wt Readings from Last 6 Encounters:   07/19/24 193 lb (87.5 kg)   07/19/24 191 lb (86.6 kg)   07/08/24 195 lb (88.5 kg)   07/04/24 195 lb (88.5 kg)   06/28/24 195 lb (88.5 kg)   06/28/24 195 lb (88.5 kg)       Patient Medications:    Current Outpatient Medications   Medication Sig Dispense Refill    metFORMIN  MG Oral Tablet 24 Hr Take 1 tablet (500 mg total) by mouth daily. 30 tablet 3    albuterol 108 (90 Base) MCG/ACT Inhalation Aero Soln Inhale 2 puffs into the lungs every 4 (four) hours as needed for Wheezing or Shortness of Breath. (Patient not taking: Reported on 7/19/2024) 18 g 5    Spacer/Aero-Holding Chambers Does not apply Device Use with hfa inhaler as directed (Patient not taking: Reported on 7/19/2024) 1 each 0    Budesonide (PULMICORT FLEXHALER) 90 MCG/ACT Inhalation Aerosol Powder, Breath Activated Inhale 2 puffs into the lungs 2 (two) times daily. (Patient not taking: Reported on 7/4/2024) 3 each 5    levetiracetam 500 MG Oral Tab Take 1 tablet (500 mg total) by mouth 2 (two) times daily. 180 tablet 0    Ferrous Sulfate 325 (65 Fe) MG Oral Tab Take 1 tablet (325 mg total) by mouth daily with breakfast. 30 tablet 1    Potassium Chloride ER 10 MEQ Oral Tab CR Take 1 tablet (10 mEq total) by mouth daily. 30 tablet 0    Cholecalciferol 50 MCG (2000 UT) Oral Tab Take 1 tablet (2,000 Units total) by mouth As Directed.      Cyanocobalamin 2500 MCG Oral Tab Take by mouth As Directed. (Patient not taking: Reported on 7/19/2024)      folic acid 400 MCG Oral Tab Take 1 tablet (400 mcg total) by mouth As Directed.       Allergies:  Amoxicillin, Penicillins, Sulfa antibiotics, Tomato, Wandy protect, Dimethicone-zinc oxide, and Zinc oxide     Social History:  Reviewed    Surgical History:  No past surgical history on file.  Family History:    Family History   Problem  Relation Age of Onset    Heart Attack Father     Anxiety Mother     Cancer Maternal Grandmother         breast and kidney cancer    Diabetes Maternal Grandmother     Heart Disease Maternal Grandfather     Diabetes Paternal Grandmother     Heart Attack Paternal Grandfather      Initial Intake:  After dinner behavior: + ice cream daily   Night eating: -  Portion sizes: -  Binge: -  Emotional: stress   Depression: + Score: 5 on sertraline   Grazing: -  Sweet tooth: +  Crunchy/salty: +  Etoh: Never   Soda Drinker: Yes                 If yes, how much?:  sprite occasional   Sports Drinks:  Yes               If yes, how much?:  Gatorade   Juice:  Yes                 If yes, how much?:  strawberry lemonade   Number of restaurant or fast food meals/week:  Rare meals/week, too expensive      Food Journal  Reviewed and Discussed:       Patient has a Food Journal?: no   Patient is reading nutrition labels?  yes  Average Caloric Intake:     Average CHO Intake:   Is patient exercising? yes  Type of exercise? 2 days/week   Dance class, boxing     Eating Habits  Patient states the following:  Eats 3 meal(s) per day  Length of time it takes to consume a meal:    # of snacks per day:  none  Type of snacks:    Amount of soda consumption per day:  None   Amount of water (in ounces) per day:  Improving   Toughest challenge:  staying consistent, health issues-siezures    3 meals/day  B: smoothie (mixed berries, banana, kale)   L: tuna/cod/shrimp, italian dressing   D: fish, rice, vegetables    S: apples, simply lemonade     Nutritional Goals  Eat 3-4 cups of fresh fruits or vegetables daily    Behavior Modifications Reviewed and Discussed  Eat breakfast, Eat 3 meals per day, Plan meals in advance, Read nutrition labels, Drink 64 oz of water per day, Maintain a daily food journal, Utlize portion control strategies to reduce calorie intake, Identify triggers for eating and manage cues, and Eat slowly and take 20 to 30 minutes to complete  each meal    Exercise Goals Reviewed and Discussed    Aim for 150 minutes moderate level exercise weekly with 2-3 days strength training as tolerated     ROS:    Constitutional: negative  Respiratory: negative  Cardiovascular: negative  Gastrointestinal: positive for constipation  Integument/breast: negative  Hematologic/lymphatic: negative  Musculoskeletal:positive for back pain scoliosis   Neurological: negative  Behavioral/Psych: positive for depression  Endocrine: negative  All other systems were reviewed and are negative    Physical Exam:  General appearance: alert, appears stated age, cooperative and obese  Head: Normocephalic, without obvious abnormality, atraumatic  Neck: no adenopathy, no carotid bruit, no JVD, supple, symmetrical, trachea midline and thyroid not enlarged, symmetric, no tenderness/mass/nodules  Lungs: clear to auscultation bilaterally  Heart: S1, S2 normal, no murmur, click, rub or gallop, regular rate and rhythm  Abdomen: soft, non-tender; bowel sounds normal; no masses,  no organomegaly and abdomen obese   Extremities: intact, no edema   Pulses: 2+ and symmetric  Skin: intact   Neurologic: Grossly normal    ASSESSMENT     Encounter Diagnosis(ses):   Encounter Diagnoses   Name Primary?    Encounter for therapeutic drug monitoring Yes    Constipation, unspecified constipation type     Obesity (BMI 30-39.9)     Chronic back pain, unspecified back location, unspecified back pain laterality     Increased appetite     Insulin resistance      PLAN     Diagnoses and all orders for this visit:    Encounter for therapeutic drug monitoring    Constipation, unspecified constipation type    Obesity (BMI 30-39.9)  -     metFORMIN  MG Oral Tablet 24 Hr; Take 1 tablet (500 mg total) by mouth daily.    Chronic back pain, unspecified back location, unspecified back pain laterality    Increased appetite    Insulin resistance  -     metFORMIN  MG Oral Tablet 24 Hr; Take 1 tablet (500 mg total) by  mouth daily.      OBESITY/WEIGHT GAIN:     Recommended patient continue intensive lifestyle and behavioral modifications at this time for weight loss.     Educated patient on lifestyle modifications: Whole Food/Plant Strong/Low Glycemic Index diet, moderate alcohol consumption, reduced sodium intake to no more than 2,400 mg/day, and at least 150 minutes of moderate physical activity per week.    Avoid processed, poor quality carbohydrates, refined grains, flour, sugar.     Goals for next month:  1. Keep a food log.   2. Drink 64 ounces of non-caloric beverages per day. No fruit juices or regular soda.  3. Aim for 150 minutes moderate exercise per week.    4. Increase fruit and vegetable servings to 5-6 per day.    5. Improve sleep and stress.      Reviewed labs:   9/12/22- CBC in range. Elevated . CMP otherwise in range.   3/29/23- Thyroid studies in range. Low hemoglobin.  10/10/23- . Hemoglobin low. CMP otherwise ok.   11/6/2023- FBS elevated. BMP otherwise in range. Low hemoglobin- supplement.   12/1/23- Vitamin D low 10.3- continue weekly supplement.   7/8/24- Low Hgb otherwise CBC in range.   4/5/24- Insulin elevated.  a1c 4.8.   Thyroid studies in range.     Denies hx cardiac disease or substance abuse.  Denies hx renal stones.     Denies personal or family hx medullary thyroid CA, endocrine neoplasia syndrome, pancreatitis hx, suicidal ideation. No renal impairment, severe GI disease, diabetes, pancreatitis risks noted.    Newly diagnosed seizures- avoid Contrave/Wellbutrin.   Last seizure one week ago.    No Zepbound coverage.    Has taken phentermine in the past.  Weight gain on phentermine 15 mg by mouth in the AM.  Avoid higher doses-severe constipation.   Has taken metformin in the past.     Restart metformin 500 mg in the AM with breakfast.   Start medication after surgical procedure 8/6/24.    Alternative: topiramate- will need neurology approval.     SQ administration teaching provided  to patient.   Discussed risks, benefits, and side effects of medication. Patient instructed to avoid pregnancy during use.     EKG done 07/2024.     s/p colonoscopy- neuroendocrine tumor- planning removal 8/6/24.       Completed pelvic floor therapy- recurrent miscarriage.     Meet with RD.     Goal: 150 lbs.      RTC 4-6 months.      REJI [place] Student

## 2024-07-20 PROBLEM — H92.09 OTALGIA: Status: RESOLVED | Noted: 2021-09-30 | Resolved: 2024-07-20

## 2024-07-20 PROBLEM — Z34.90 PREGNANCY (HCC): Status: RESOLVED | Noted: 2024-05-03 | Resolved: 2024-07-20

## 2024-07-20 NOTE — ASSESSMENT & PLAN NOTE
Appears that syncope is actually seizure activity-has follow up with neuro  Discussed care in avoiding high risk situations that may be life threatening if seizure occurs  Pt is not to drive.  No one on one involvement with children at this time
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
## 2024-07-22 ENCOUNTER — TELEPHONE (OUTPATIENT)
Dept: FAMILY MEDICINE CLINIC | Facility: CLINIC | Age: 24
End: 2024-07-22

## 2024-07-22 DIAGNOSIS — E87.6 HYPOKALEMIA: ICD-10-CM

## 2024-07-22 RX ORDER — POTASSIUM CHLORIDE 750 MG/1
10 TABLET, FILM COATED, EXTENDED RELEASE ORAL DAILY
Qty: 30 TABLET | Refills: 0 | Status: SHIPPED | OUTPATIENT
Start: 2024-07-22

## 2024-07-25 ENCOUNTER — OFFICE VISIT (OUTPATIENT)
Dept: NEUROLOGY | Facility: CLINIC | Age: 24
End: 2024-07-25
Payer: COMMERCIAL

## 2024-07-25 DIAGNOSIS — G40.309 GENERALIZED EPILEPSY (HCC): Primary | ICD-10-CM

## 2024-07-25 DIAGNOSIS — G40.919 BREAKTHROUGH SEIZURE (HCC): ICD-10-CM

## 2024-07-25 PROBLEM — O09.90 HIGH-RISK PREGNANCY (HCC): Status: ACTIVE | Noted: 2024-05-03

## 2024-07-25 PROCEDURE — 99214 OFFICE O/P EST MOD 30 MIN: CPT | Performed by: OTHER

## 2024-07-25 RX ORDER — LEVETIRACETAM 1000 MG/1
1000 TABLET ORAL 2 TIMES DAILY
Qty: 180 TABLET | Refills: 3 | Status: SHIPPED | OUTPATIENT
Start: 2024-07-25 | End: 2025-07-20
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
## 2024-07-25 NOTE — PATIENT INSTRUCTIONS
SEIZURES   Please do NOT drive until you are cleared by neurology    GENERAL INFORMATION:   A seizure, or convulsion, is uncontrolled jerking of the arms, legs, or face that lasts anywhere from a few seconds to minutes. Seizures can occur after a head injury, stroke, or brain tissue infection. In more than half of patients, the cause is not known. This is called epilepsy.     INSTRUCTIONS:   1. Please continue to take Keppra to prevent seizures, take it exactly as directed. Do not stop taking the medicine without talking to your doctor first, even if you have not had a seizure in a long time.   2. Avoid activities in which a seizure would cause danger to yourself or to others. Don't operate dangerous machinery, swim alone, or climb in high or dangerous places, such as on ladders, roofs, or girders. Do not drive unless your doctor says you may.   3. Wear an emergency medical identification bracelet with information about your seizure. If you have a seizure, people around you will know what is wrong and get appropriate help.   4. If you have any warning that you may have a seizure, lay down in a safe place where you can't hurt yourself.   5. Do not drink alcohol or take illegal drugs. These can make you more susceptible to having seizures.   6. Please avoid potential seizure triggers as much as possible: stress, sleep deprivation, alcohol, successive stimulant use, illegal drugs, illness/infections     CONTACT YOUR DOCTOR IF:   You have any problems that may be related to the medicine you are taking.   Teach your family, close friends, or co-workers what to do if you have a seizure:   1. Stay calm. Keep the person from falling onto harmful objects. Move hard or sharp objects out of the way.   2. Don't force anything into the person's mouth or try to open clenched jaws. Turn the person on his or her side when the violent movement stops or if he or she is vomiting.   3. When the seizure is over, the person may be  confused or drowsy. Reassure the person that he or she is all right. Help him or her to relax.   4. Call 911 and bring the patient back to the ED if:   a. The patient doesn't awaken shortly after the seizure   b. The patient has new problems such as difficulty seeing, speaking or moving   c. The patient was injured during the seizure   d. The patient has a temperature over 102 F (39C)   e. The patient vomited and now is having trouble breathing    Please continue to practice seizure precautions and first aid.   Please do not climb to high places, such as rooftops, up trees or mountain climbing. When near water, you should be supervised by an adult or person who is aware of risk of seizures, for example during tub baths, swimming, boating or fishing. A helmet should be worn when riding a bike.     First Aid for a generalized seizure:   -Remain calm and do not panic, call for assistance if needed.   -Lower the person safely to the ground and loosen any tight clothing.   -Place the person in a side-lying position so any saliva or vomit will easily drain out of the mouth. Actively seizing people are at a increased risk of choking on their saliva or vomit. Do not put any objects such as a tongue depressor or fingers into the mouth. Protect the persons head from injury while they are on their side.   -Time the seizure from start to finish so you know how long it lasted (most grand mal seizures are no more than 1 or 2 minutes long). If the seizure is continuing longer than 5 minutes, call the ambulance at 911 for transportation to the nearest Emergency Room.   -After a grand mal seizure, people are very sleepy and tired for several minutes or even a couple of hours. They may also complain of headache, nausea and may vomit.   Please remember:   Call your doctor if you feel that the severity or number of seizures has changed from baseline.   If you have several grand mal seizures without waking up in-between, please notify  your doctor.     ----------------------------------------------   Seizure safety during sleep:   Always take anti-epileptic medications as prescribed by your doctor.   Objects near the bed may cause injury if someone has a seizure is prone to falling out of bed. Move heavy furniture, floor lamps, night stands and other dangerous objects away from the bed.   Mattresses and pillows should be firm and not soft. All stuffed animals, toys and other objects should be removed from the bed. Blankets can be layered but should be thin, down comforters may be too soft.   Keep the bed as low to the ground as possible. Some patients with night time seizures may sleep with their mattress on the floor. Others may pad the floor with mats (such as exercise mats used in workout facilities) to pad the floor.   During sleep, keep the bedroom door cracked open so someone can hear if you are having a seizure. Never lock the bedroom door. Some people choose to use baby monitors to observe for seizures at night.

## 2024-07-26 NOTE — TELEPHONE ENCOUNTER
Short term disability forms rec'd via email with valid Release of Information. Logged for processing.

## 2024-08-01 ENCOUNTER — TELEPHONE (OUTPATIENT)
Facility: CLINIC | Age: 24
End: 2024-08-01

## 2024-08-01 NOTE — TELEPHONE ENCOUNTER
Patient calling states regards name of preparation that was given to her after the first one she was allergic to. Please call.

## 2024-08-01 NOTE — TELEPHONE ENCOUNTER
Patient called requesting a phone callback regarding these forms. Per patient she would like to clarify some information.

## 2024-08-01 NOTE — TELEPHONE ENCOUNTER
Tabitha,    Patient calling seeking disability and Family Medical Leave Act forms to be completed. Patient seeking disability due to seizures. Start date 07/08/24-09/01/24 with restrictions. Patient states when she seen you on 07/19/24 you filled out forms for her to Return to work with restrictions.     Patient is also seeking Family Medical Leave Act due to procedures she will be having with outside provider. Patient is scheduled to have colonoscopy, endoscopy and removal of tumor on 08/06/24. Start date on Family Medical Leave Act 08/06/24-2-3 weeks. Do you support patient's request.     ThanksLeni

## 2024-08-01 NOTE — TELEPHONE ENCOUNTER
Patient called forms dept. Patient calling wanting status on her disability forms. Informed patient forms were received 07/22/24 and current turn around time is 10-12 business days. Patient states when she dropped off paperwork she informed fd she needed forms by this week. Patient states she was given and extension until tomorrow. Informed patient we can do her forms stat but we cannot guarantee forms will be signed by tomorrow, we allow 24-48 hrs for providers to sign off.     Patient seeking disability due to seizures. Start date 07/08/24-09/01/24. Patient states Tabitha filled out forms stating patient can Return to work with restrictions until 09/01/24. Informed patient we did not have a copy and will need to send provider a message.     Patient also has Family Medical Leave Act forms to be completed. Patient seeking continuous Family Medical Leave Act for procedure she will be having with outside provider. Patient to have colonoscopy, endoscopy and removal of tumor 08/06/24. Start date on Family Medical Leave Act 08/06/24- 2-3wks. Patient states Tabitha aware of forms. Provider will be tasked.

## 2024-08-02 NOTE — TELEPHONE ENCOUNTER
Tabitha,    **2 forms needing signature**     *The ACKNOWLEDGE button has been moved to the top right ribbon*    Please sign off on form if you agree to: Disability due to seizures. Start date 07/08/24-9/01/24. Continuous Family Medical Leave Act due to tumor removal, colonoscopy, endoscopy. Start date 08/06/24- 2-3 wks.  (place your signature on the first page only)    -From your Inbasket, Highlight the patient and click Chart   -Double click the 07/22/2024 Forms Completion telephone encounter  -Scroll down to the Media section   -Click the blue Hyperlink: disability SELMA FloodDavid 08/01/24 and Family Medical Leave Act SELMA FloodDavid 08/01/24  -Click Acknowledge located in the top right ribbon/menu   -Drag the mouse into the blank space of the document and a + sign will appear. Left click to   electronically sign the document.     Thank you,    Leni

## 2024-08-06 NOTE — TELEPHONE ENCOUNTER
Tabitha,     **1 form needing signature**     *The ACKNOWLEDGE button has been moved to the top right ribbon*     Please sign off on form if you agree to: Disability due to seizures. Start date 07/08/24-9/01/24.    (place your signature on the first page only)     -From your Inbasket, Highlight the patient and click Chart   -Double click the 07/22/2024 Forms Completion telephone encounter  -Scroll down to the Media section   -Click the blue Hyperlink: disability SELMA David 08/01/24    -Click Acknowledge located in the top right ribbon/menu   -Drag the mouse into the blank space of the document and a + sign will appear. Left click to   electronically sign the document.     Thank you,     Leni

## 2024-08-07 NOTE — TELEPHONE ENCOUNTER
Tabitha,     ** I am sorry I do not see your signature. Can you please sign it again. Thank you.**    **1 form needing signature**     *The ACKNOWLEDGE button has been moved to the top right ribbon*     Please sign off on form if you agree to: Disability due to seizures. Start date 07/08/24-9/01/24.    (place your signature on the first page only)     -From your Inbasket, Highlight the patient and click Chart   -Double click the 07/22/2024 Forms Completion telephone encounter  -Scroll down to the Media section   -Click the blue Hyperlink: disability SELMA Hernandez 08/01/24    -Click Acknowledge located in the top right ribbon/menu   -Drag the mouse into the blank space of the document and a + sign will appear. Left click to   electronically sign the document.     Thank you,     Leni

## 2024-08-07 NOTE — TELEPHONE ENCOUNTER
Disability forms efaxed to [facility] [phone], right fax confirmation received. Called patient to inform disability faxed and Family Medical Leave Act ready for , phone rang and went to busy signal. Azooo message will be sent.

## 2024-08-12 ENCOUNTER — HOSPITAL ENCOUNTER (OUTPATIENT)
Dept: ELECTROPHYSIOLOGY | Facility: HOSPITAL | Age: 24
Discharge: HOME | End: 2024-08-12
Attending: Other
Payer: COMMERCIAL

## 2024-08-12 DIAGNOSIS — G40.309 GENERALIZED EPILEPSY (HCC): ICD-10-CM

## 2024-08-12 PROCEDURE — 95700 EEG CONT REC W/VID EEG TECH: CPT

## 2024-08-12 PROCEDURE — 95708 EEG WO VID EA 12-26HR UNMNTR: CPT

## 2024-08-13 ENCOUNTER — HOSPITAL ENCOUNTER (OUTPATIENT)
Dept: ELECTROPHYSIOLOGY | Facility: HOSPITAL | Age: 24
Discharge: HOME | End: 2024-08-13
Attending: Other
Payer: COMMERCIAL

## 2024-08-13 PROCEDURE — 95708 EEG WO VID EA 12-26HR UNMNTR: CPT

## 2024-08-14 ENCOUNTER — HOSPITAL ENCOUNTER (OUTPATIENT)
Dept: ELECTROPHYSIOLOGY | Facility: HOSPITAL | Age: 24
Discharge: HOME | End: 2024-08-14
Attending: Other
Payer: COMMERCIAL

## 2024-08-14 PROCEDURE — 95708 EEG WO VID EA 12-26HR UNMNTR: CPT

## 2024-08-15 ENCOUNTER — HOSPITAL ENCOUNTER (OUTPATIENT)
Dept: ELECTROPHYSIOLOGY | Facility: HOSPITAL | Age: 24
Discharge: HOME | End: 2024-08-15
Attending: Other
Payer: COMMERCIAL

## 2024-08-27 ENCOUNTER — TELEPHONE (OUTPATIENT)
Dept: FAMILY MEDICINE CLINIC | Facility: CLINIC | Age: 24
End: 2024-08-27

## 2024-08-27 NOTE — TELEPHONE ENCOUNTER
Pt dropped off return to work form for Tabitha to complete. Please advise when ready to be picked up.

## 2024-08-28 ENCOUNTER — TELEMEDICINE (OUTPATIENT)
Dept: FAMILY MEDICINE CLINIC | Facility: CLINIC | Age: 24
End: 2024-08-28
Payer: COMMERCIAL

## 2024-08-28 ENCOUNTER — PATIENT MESSAGE (OUTPATIENT)
Dept: FAMILY MEDICINE CLINIC | Facility: CLINIC | Age: 24
End: 2024-08-28

## 2024-08-28 DIAGNOSIS — D3A.8 NEUROENDOCRINE TUMOR (HCC): ICD-10-CM

## 2024-08-28 DIAGNOSIS — J45.40 MODERATE PERSISTENT ASTHMA WITHOUT COMPLICATION (HCC): ICD-10-CM

## 2024-08-28 DIAGNOSIS — G40.309 GENERALIZED EPILEPSY (HCC): Primary | ICD-10-CM

## 2024-08-28 PROBLEM — O09.90 HIGH-RISK PREGNANCY (HCC): Status: RESOLVED | Noted: 2024-05-03 | Resolved: 2024-08-28

## 2024-08-28 PROCEDURE — 99214 OFFICE O/P EST MOD 30 MIN: CPT | Performed by: NURSE PRACTITIONER

## 2024-08-28 NOTE — TELEPHONE ENCOUNTER
From: Kaylie Vance  To: Tabitha Hernandez  Sent: 8/28/2024 9:44 AM CDT  Subject: Todays appointment     Hello , I am just checking in to make sure my appointment didn’t get canceled I know you wanted me to come in person. But I couldn’t this week   So I want to have a video visit about the important things and follow up in person next week or so.

## 2024-08-28 NOTE — TELEPHONE ENCOUNTER
Patient and provider Tabitha MENDOZA discussed about the form at today's 8/28/24 video appointment.

## 2024-08-28 NOTE — ASSESSMENT & PLAN NOTE
Emg was within normal limits  Continue Keppra as prescribed  Follow up w neurology as she had seizure last night  No work x 2 weeks-make follow up appt

## 2024-08-28 NOTE — PROGRESS NOTES
HPI  Video Visit  Pt here for follow up hosptialization.   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 Was in the hospital for 6 days    Had 2 seizures in hospital. Had one seizure at home last night. Keppra was increased to 1000 mg twice a day  Had EEG done-results were negative.     Has follow up appointment w endocrine at [facility] due to pituitary tumor that was found on ct scan brain. 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    Family medical history-paternal aunt had pituitary tumor, pat GGM-pituitary tumor, colon ca  Review of Systems   Constitutional:  Positive for activity change. Negative for appetite change.   Respiratory:  Positive for chest tightness and shortness of breath. Negative for cough and wheezing.    Neurological:  Positive for seizures.       There were no vitals filed for this visit.  There is no height or weight on file to calculate BMI.  Wt Readings from Last 6 Encounters:   07/19/24 193 lb (87.5 kg)   07/19/24 191 lb (86.6 kg)   07/08/24 195 lb (88.5 kg)   07/04/24 195 lb (88.5 kg)   06/28/24 195 lb (88.5 kg)   06/28/24 195 lb (88.5 kg)        Health Maintenance   Topic Date Due    Asthma Action Plan  Never done    Asthma Control Test  Never done    Pneumococcal Vaccine: Birth to 64yrs (1 of 2 - PCV) 11/03/2006    Zoster Vaccines (1 of 2) Never done    DTaP,Tdap,and Td Vaccines (7 - Td or Tdap) 01/18/2022    HPV Vaccines (2 - Risk 3-dose series) 03/14/2023    COVID-19 Vaccine (2 - 2023-24 season) 09/01/2023    Annual Depression Screening  01/01/2024    Chlamydia Screening  08/23/2024    Annual Physical  08/23/2024    Influenza Vaccine (1) 10/01/2024    Pap Smear  08/23/2026       Patient's last menstrual period was 01/28/2024 (approximate).    Past Medical History:    Anxiety    Depression    Fibromyalgia    Lupus (HCC)    Neuroendocrine tumor (HCC)     PONV (postoperative nausea and vomiting)    Seizure disorder (HCC)       .No past surgical history on file.    Family History   Problem Relation Age of Onset    Heart Attack Father     Anxiety Mother     Cancer Maternal Grandmother         breast and kidney cancer    Diabetes Maternal Grandmother     Heart Disease Maternal Grandfather     Diabetes Paternal Grandmother     Heart Attack Paternal Grandfather        Social History     Socioeconomic History    Marital status: Single     Spouse name: Not on file    Number of children: Not on file    Years of education: Not on file    Highest education level: Not on file   Occupational History    Not on file   Tobacco Use    Smoking status: Never     Passive exposure: Never    Smokeless tobacco: Never   Vaping Use    Vaping status: Never Used   Substance and Sexual Activity    Alcohol use: Never    Drug use: Never    Sexual activity: Not on file   Other Topics Concern    Not on file   Social History Narrative    Not on file     Social Determinants of Health     Financial Resource Strain: Medium Risk (8/6/2024)    [facility]    Overall Financial Resource Strain (CARDIA)     Difficulty of Paying Living Expenses: Somewhat hard   Food Insecurity: No Food Insecurity (8/6/2024)    Received from [facility]    Hunger Vital Sign     Worried About Running Out of Food in the Last Year: Never true     Ran Out of Food in the Last Year: Never true   Transportation Needs: No Transportation Needs (8/6/2024)    Received from [facility]    PRAPARE - Transportation     Lack of Transportation (Medical): No     Lack of Transportation (Non-Medical): No   Recent Concern: Transportation Needs - Unmet Transportation Needs (7/8/2024)    Transportation Needs     Lack of Transportation: Yes     Car Seat: Not on file   Physical Activity: Insufficiently Active (8/6/2024)    Received from [facility]    Exercise  Vital Sign     Days of Exercise per Week: 3 days     Minutes of Exercise per Session: 30 min   Stress: No Stress Concern Present (8/6/2024)    Received from [facility]    Citizen of [place] of Occupational Health - Occupational Stress Questionnaire     Feeling of Stress : Not at all   Recent Concern: Stress - Stress Concern Present (7/8/2024)    Stress     Feeling of Stress : Yes   Social Connections: Low Risk  (7/4/2024)    Received from [facility]    Social Connections     How often do you see or talk to people that you care about and feel close to? (For example: talking to friends on the phone, visiting friends or family, going to Mosque or club meetings): 5 or more times a week   Housing Stability: Unknown (8/6/2024)    Received from [facility]    Housing Stability Vital Sign     Unable to Pay for Housing in the Last Year: No     Number of Places Lived in the Last Year: Not on file     In the last 12 months, was there a time when you did not have a steady place to sleep or slept in a shelter (including now)?: No   Recent Concern: Housing Stability - Medium Risk (7/8/2024)    Housing Stability     Housing Instability: No     Housing Instability Emergency: Not on file     Crib or Bassinette: No       Current Outpatient Medications   Medication Sig Dispense Refill    levETIRAcetam 1000 MG Oral Tab Take 1 tablet (1,000 mg total) by mouth 2 (two) times daily. 180 tablet 3    Potassium Chloride ER 10 MEQ Oral Tab CR Take 1 tablet (10 mEq total) by mouth daily. 30 tablet 0    metFORMIN  MG Oral Tablet 24 Hr Take 1 tablet (500 mg total) by mouth daily. 30 tablet 3    albuterol 108 (90 Base) MCG/ACT Inhalation Aero Soln Inhale 2 puffs into the lungs every 4 (four) hours as needed for Wheezing or Shortness of Breath. (Patient not taking: Reported on 7/19/2024) 18 g 5    Spacer/Aero-Holding Chambers Does not apply Device Use with hfa inhaler as directed (Patient not  taking: Reported on 7/19/2024) 1 each 0    Budesonide (PULMICORT FLEXHALER) 90 MCG/ACT Inhalation Aerosol Powder, Breath Activated Inhale 2 puffs into the lungs 2 (two) times daily. (Patient not taking: Reported on 7/4/2024) 3 each 5    Ferrous Sulfate 325 (65 Fe) MG Oral Tab Take 1 tablet (325 mg total) by mouth daily with breakfast. 30 tablet 1    Cholecalciferol 50 MCG (2000 UT) Oral Tab Take 1 tablet (2,000 Units total) by mouth As Directed.      Cyanocobalamin 2500 MCG Oral Tab Take by mouth As Directed. (Patient not taking: Reported on 7/19/2024)      folic acid 400 MCG Oral Tab Take 1 tablet (400 mcg total) by mouth As Directed.         Allergies:  Allergies   Allergen Reactions    Amoxicillin HIVES    Amoxicillin-Pot Clavulanate DIARRHEA, RASH, HIVES and NAUSEA AND VOMITING    Penicillins DIARRHEA, HIVES, NAUSEA ONLY, RASH, FEVER and NAUSEA AND VOMITING     Other reaction(s): GI Symptoms, Hives   Other reaction(s): GI Symptoms   Other reaction(s): Hives    Sulfa Antibiotics DIARRHEA, HIVES, RASH, UNKNOWN, FEVER and NAUSEA AND VOMITING     Other reaction(s): Unknown    Tomato HIVES, NAUSEA ONLY and NAUSEA AND VOMITING    Wandy Protect RASH    Dimethicone-Zinc Oxide RASH    Zinc Oxide DIARRHEA and RASH       Physical Exam  Nursing note reviewed.   Constitutional:       General: She is not in acute distress.  Pulmonary:      Effort: Pulmonary effort is normal. No respiratory distress.   Neurological:      Mental Status: She is alert and oriented to person, place, and time.       CT HEAD WO, 8/8/2024 2:19 PM     HISTORY: Reason: fall and head strike  History: fall and head strike     TECHNIQUE: Multiple axial CT images of the brain were obtained without contrast, with   coronal and sagittal reformatted images.     COMPARISON: None.     FINDINGS: The ventricles and sulci are within normal limits.  There is no midline shift or   mass effect.  There is no intracranial hemorrhage. There is an apparent area of  relative   hyperdensity along the posterior aspect of the anterior pituitary gland, although this may   at least in part be artifactual in etiology from the surrounding bone and associated beam   hardening artifact. There are no other areas of questioned abnormal attenuation.  There is   no extraaxial fluid collection. Minimal mucosal thickening in the maxillary sinuses   bilaterally with scattered trace mucosal thickening in ethmoid air cells. The visualized   portions of the mastoids/middle ears are grossly clear. There may be subtle stranding   within the subcutaneous fat posterior parietal occipital scalp. There is no calvarial   fracture.     IMPRESSION:     1. No acute intracranial abnormality.     2.  Possible mild posterior scalp soft tissue swelling without calvarial fracture.     3.  Questioned relative hyperattenuation along the posterior aspect of the anterior   pituitary gland, which at least in part may be artifactual in etiology. Alternatively,   this could represent proteinaceous or hemorrhagic lesion within the pituitary. Please   correlate with serum hormone levels, and nonurgent MRI pituitary may be obtained for   further evaluation.     Report Electronically Signed: 8/8/2024 2:26 PM Keira Alcocer MD     CTA PE CHEST W     CLINICAL INFORMATION: Female, 23 years. Reason: acute chest pain/dyspnea post procedure.     TECHNIQUE:   Pulmonary embolism contrast enhanced chest CTA.  90 mL of Omnipaque 350   contrast material were administered intravenously. Multiplanar reconstructions, including   MIP, MINIP and Slab images were created and reviewed.? 3D imaging (coronal maximum   intensity projection?images) was generated from the source data on the acquisition   scanner.     COMPARISON: None     FINDINGS:     PULMONARY ARTERIES: No definite acute pulmonary embolism. Of the subsegmental branches are   mildly degraded by adjacent parenchymal opacity. Main pulmonary artery is within normal   limits. No  acute heart strain.     LUNGS AND PLEURA: Trachea and central airways are patent. Mild bronchial wall thickening.   Hypoinflation. Mild dependent and lower lobe predominant opacities, new from previous   abdominal CT dated 2/16/2024.   Mosaic attenuation which reflect small airways or small vessel disease.   No pleural effusion or pneumothorax.   No suspicious pulmonary nodules or masses.     MEDIASTINUM AND LISA: The thyroid gland is within normal limits in size.   No more mediastinal or hilar lymphadenopathy.   The thoracic aorta is normal in course and caliber.   Heart size is within normal limits without pericardial effusion/thickening.     CORONARY ARTERY CALCIFICATION: None.     Assessment and Plan:  Problem List Items Addressed This Visit       Generalized epilepsy (HCC) - Primary     Emg was within normal limits  Continue Keppra as prescribed  Follow up w neurology as she had seizure last night  No work x 2 weeks-make follow up appt         Moderate persistent asthma without complication (HCC)     Continue present management  Follow up pulm         Neuroendocrine tumor (HCC)     Had tumor removed 8/6/2024                        Discussed plan of care with pt and pt is in agreement.All questions answered. Pt to call with questions or concerns.    Encouraged to sign up for My Chart if not already registered.     Note to patient and family:  The 21st Century Cures Act makes medical notes available to patients in the interest of transparency.  However, please be advised that this is a medical document.  It is intended as a peer to peer communication.  It is written in medical language and may contain abbreviations or verbiage that are technical and unfamiliar.  It may appear blunt or direct.  Medical documents are intended to carry relevant information, facts as evident, and the clinical opinion of the practitioner.

## 2024-08-30 ENCOUNTER — HOSPITAL ENCOUNTER (OUTPATIENT)
Age: 24
Discharge: ANOTHER HOSPITAL | End: 2024-08-30
Payer: COMMERCIAL

## 2024-08-30 VITALS
RESPIRATION RATE: 18 BRPM | SYSTOLIC BLOOD PRESSURE: 120 MMHG | OXYGEN SATURATION: 100 % | TEMPERATURE: 97 F | HEART RATE: 80 BPM | DIASTOLIC BLOOD PRESSURE: 51 MMHG

## 2024-08-30 DIAGNOSIS — R19.7 DIARRHEA, UNSPECIFIED TYPE: Primary | ICD-10-CM

## 2024-08-30 PROCEDURE — 99215 OFFICE O/P EST HI 40 MIN: CPT | Performed by: NURSE PRACTITIONER

## 2024-08-31 DIAGNOSIS — J45.40 MODERATE PERSISTENT ASTHMA WITHOUT COMPLICATION (HCC): ICD-10-CM

## 2024-08-31 NOTE — ED INITIAL ASSESSMENT (HPI)
Here for eval of abd pain / diarrhea that started Monday. Recent colonoscopy on 8/6 w/ resection of mass. Discharged from hospital a week later for complications w/ anaesthesia. Resumed bms approx a week and a half later. Concerned that the bm pta was bloody.   Denies dizziness, n/v, fevers.

## 2024-08-31 NOTE — ED PROVIDER NOTES
Patient Seen in: [facility]      History     Chief Complaint   Patient presents with    Abdomen/Flank Pain     Stated Complaint: diarrhea,abdominal pain    Subjective:   HPI    23-year-old female presents to immediate care with diarrhea.  Patient has a complex past medical history.  She had a colonoscopy done on August 6, 2024.  There were complications from anesthesia so she was admitted.  Patient states she did have a malignant tumor removed during the colonoscopy procedure.  She also has a seizure disorder and had a seizure shortly after her hospitalization.  She was discharged home on August 12.  She did not have regular bowel movements the week after and then this past Monday she had episode of diarrhea.  She has had no vomiting.  She has had some abdominal cramping.  This evening just prior to arrival to immediate care she had an episode of diarrhea with bloody stool.  She is afebrile she appears in no acute distress.    Objective:   No pertinent past medical history.            No pertinent past surgical history.              No pertinent social history.            Review of Systems    Positive for stated Chief Complaint: Abdomen/Flank Pain    Other systems are as noted in HPI.  Constitutional and vital signs reviewed.      All other systems reviewed and negative except as noted above.    Physical Exam     ED Triage Vitals [08/30/24 1853]   /51   Pulse 80   Resp 18   Temp 97.3 °F (36.3 °C)   Temp src Temporal   SpO2 100 %   O2 Device None (Room air)       Current Vitals:   Vital Signs  BP: 120/51  Pulse: 80  Resp: 18  Temp: 97.3 °F (36.3 °C)  Temp src: Temporal    Oxygen Therapy  SpO2: 100 %  O2 Device: None (Room air)            Physical Exam  Vitals reviewed.   Constitutional:       General: She is not in acute distress.  Cardiovascular:      Rate and Rhythm: Normal rate and regular rhythm.   Pulmonary:      Effort: Pulmonary effort is normal.      Breath sounds: Normal breath sounds.    Abdominal:      Tenderness: There is no abdominal tenderness.   Skin:     General: Skin is warm and dry.   Neurological:      General: No focal deficit present.      Mental Status: She is alert.   Psychiatric:         Mood and Affect: Mood normal.         Behavior: Behavior normal.               ED Course   Labs Reviewed - No data to display                   MDM                                         Medical Decision Making  23-year-old female presents with diarrhea.  Differential diagnosis includes gastroenteritis, infectious diarrhea, diarrhea post colonoscopy, small bowel obstruction.  Patient had a colonoscopy done on August 6, 2024 for which they removed a malignant tumor.  She had complications postprocedure that caused admission x 1 week.  The week after she was home she did not have a bowel movement but then this past Monday she started with diarrhea.  Tonight she had an episode of diarrhea with bloody stool.  Treatment and evaluation options were discussed with the patient.  She was informed of our limitations in immediate care.  She was instructed to go to the emergency department for further evaluation and treatment.  She will choose to go to [facility] as most of her doctors are with [facility].  Patient agrees and will go in private vehicle.        Disposition and Plan     Clinical Impression:  1. Diarrhea, unspecified type         Disposition:  Ic to ed  8/30/2024  7:05 pm    Follow-up:  No follow-up provider specified.        Medications Prescribed:  Discharge Medication List as of 8/30/2024  7:07 PM

## 2024-09-03 RX ORDER — BUDESONIDE 90 UG/1
2 AEROSOL, POWDER RESPIRATORY (INHALATION) 2 TIMES DAILY
Qty: 3 EACH | Refills: 3 | Status: SHIPPED | OUTPATIENT
Start: 2024-09-03

## 2024-09-03 NOTE — TELEPHONE ENCOUNTER
Please review; protocol failed/ has no protocol    Requested Prescriptions   Pending Prescriptions Disp Refills    Budesonide (PULMICORT FLEXHALER) 90 MCG/ACT Inhalation Aerosol Powder, Breath Activated 3 each 5     Sig: Inhale 2 puffs into the lungs 2 (two) times daily.       Asthma & COPD Medication Protocol Failed - 2024 12:32 AM        Failed - Asthma Action Score greater than or equal to 20        Failed - AAP/ACT given in last 12 months     No data recorded  No data recorded  No data recorded  No data recorded          Passed - Appointment in past 6 or next 3 months      Recent Outpatient Visits              6 days ago Generalized epilepsy ([facility])    [facility], Tabitha Welsh APRN    Telemedicine    1 month ago Generalized epilepsy ([facility])    [facility], [facility] Sana Khan,     Office Visit    1 month ago Encounter for therapeutic drug monitoring    [facility], Dolores Olivares APRN    Office Visit    1 month ago Spontaneous  ([facility])    [facility], Tabitha Welsh APRN    Office Visit    2 months ago Seasonal allergies    [facility] APRN    Office Visit          Future Appointments         Provider Department Appt Notes    In 1 week [facility]    In 1 month Gregoria Santos DO [facility] [place] 3 month follow up                       Recent Outpatient Visits              6 days ago Generalized epilepsy ([facility])    [facility], Tabitha Welsh APRN    Telemedicine    1 month ago Generalized epilepsy ([facility])    [facility], [place] [facility] [place] Sana Khan,     Office Visit     1 month ago Encounter for therapeutic drug monitoring    [facility], [facility], Dolores Olivares APRN    Office Visit    1 month ago Spontaneous  (HCC)    [facility] Tabitha Hernandez APRN    Office Visit    2 months ago Seasonal allergies    [facility]    [facility]          Future Appointments         Provider Department Appt Notes    In 1 week Tabith[facility] Hernandez APRN [facility]    In 1 month Gregoria Santos DO [facility], [place] [facility] Waldron 3 month follow up

## 2024-09-04 DIAGNOSIS — E66.9 OBESITY (BMI 30-39.9): ICD-10-CM

## 2024-09-04 NOTE — Clinical Note
Dear Pia Pan,  Thank you for sending Brittaney Jacobo to see me for physiatry consultation. I appreciate your confidence in me to care for your patients. Please feel free call me with any questions at [phone] or contact me through [facility]   Sincerely, Jocelin Enriquez MD Board Certified, Physical Medicine and Rehabilitation Specializing in [address] [facility], Spine Medicine and [address] [facility]
No Vaccines Administered.

## 2024-09-05 RX ORDER — PHENTERMINE HYDROCHLORIDE 37.5 MG/1
37.5 TABLET ORAL
Qty: 30 TABLET | Refills: 0 | OUTPATIENT
Start: 2024-09-05

## 2024-09-10 ENCOUNTER — GENETICS ENCOUNTER (OUTPATIENT)
Dept: GENETICS | Facility: HOSPITAL | Age: 24
End: 2024-09-10
Attending: NURSE PRACTITIONER
Payer: COMMERCIAL

## 2024-09-10 ENCOUNTER — NURSE ONLY (OUTPATIENT)
Dept: HEMATOLOGY/ONCOLOGY | Facility: HOSPITAL | Age: 24
End: 2024-09-10
Attending: NURSE PRACTITIONER
Payer: COMMERCIAL

## 2024-09-10 DIAGNOSIS — D49.7 PITUITARY TUMOR: ICD-10-CM

## 2024-09-10 DIAGNOSIS — D3A.8 NEUROENDOCRINE TUMOR (HCC): Primary | ICD-10-CM

## 2024-09-10 DIAGNOSIS — Z80.3 FAMILY HISTORY OF MALIGNANT NEOPLASM OF BREAST: ICD-10-CM

## 2024-09-10 DIAGNOSIS — Z80.0 FHX: COLON CANCER: ICD-10-CM

## 2024-09-10 PROCEDURE — 96040 HC GENETIC COUNSELING EA 30 MIN: CPT | Performed by: GENETIC COUNSELOR, MS

## 2024-09-10 PROCEDURE — 36415 COLL VENOUS BLD VENIPUNCTURE: CPT

## 2024-09-11 ENCOUNTER — OFFICE VISIT (OUTPATIENT)
Dept: ENDOCRINOLOGY CLINIC | Facility: CLINIC | Age: 24
End: 2024-09-11

## 2024-09-11 ENCOUNTER — TELEPHONE (OUTPATIENT)
Dept: FAMILY MEDICINE CLINIC | Facility: CLINIC | Age: 24
End: 2024-09-11

## 2024-09-11 VITALS
WEIGHT: 195 LBS | HEIGHT: 62 IN | SYSTOLIC BLOOD PRESSURE: 90 MMHG | DIASTOLIC BLOOD PRESSURE: 54 MMHG | BODY MASS INDEX: 35.88 KG/M2 | HEART RATE: 82 BPM

## 2024-09-11 DIAGNOSIS — D35.2 PITUITARY ADENOMA (HCC): Primary | ICD-10-CM

## 2024-09-11 DIAGNOSIS — Z13.9 SCREENING DUE: ICD-10-CM

## 2024-09-11 DIAGNOSIS — D3A.8 NEUROENDOCRINE TUMOR (HCC): ICD-10-CM

## 2024-09-11 PROBLEM — D49.7 PITUITARY TUMOR: Status: ACTIVE | Noted: 2024-09-11

## 2024-09-11 PROBLEM — Z80.3 FAMILY HISTORY OF MALIGNANT NEOPLASM OF BREAST: Status: ACTIVE | Noted: 2024-09-11

## 2024-09-11 PROBLEM — Z80.0 FHX: COLON CANCER: Status: ACTIVE | Noted: 2024-09-11

## 2024-09-11 PROCEDURE — 3008F BODY MASS INDEX DOCD: CPT | Performed by: INTERNAL MEDICINE

## 2024-09-11 PROCEDURE — 3078F DIAST BP <80 MM HG: CPT | Performed by: INTERNAL MEDICINE

## 2024-09-11 PROCEDURE — 3074F SYST BP LT 130 MM HG: CPT | Performed by: INTERNAL MEDICINE

## 2024-09-11 PROCEDURE — 99244 OFF/OP CNSLTJ NEW/EST MOD 40: CPT | Performed by: INTERNAL MEDICINE

## 2024-09-11 NOTE — PROGRESS NOTES
Reason for visit: Ms. Vance is a 23-year-old woman referred for genetic counseling due to her personal history of a neuroendocrine tumor (NET) and her family history of cancer.  She was seen for genetic counseling and to discuss the option of genetic testing.  Ms. 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  Referring MD: REJI Joseph  Relevant family history:  Ms. Vance shares that she has several family members who have been diagnosed with malignancies at early ages.  Her maternal grandmother was diagnosed with breast cancer at age 50 and with thyroid cancer at age 60 and her paternal grandfather was diagnosed with colon cancer at age 35.  Her paternal grandmother was recently diagnosed with breast cancer at age 67.  She also describes that a paternal aunt and her paternal grandfather were all diagnosed with pituitary tumors, and she has recently been found to have a pituitary tumor herself and is awaiting further evaluation/treatment of this.  She does not believe that any family members have pursued genetic testing to date.  She is of  and [place] descent on her maternal side of her family and of  descent on her paternal side of her family and is unaware of Ashkenazi Methodist heritage and denies any consanguinity.    Medical history: Ms. Vance explains a complicated medical history and a number of current complaints.  She has a history of recurrent pregnancy loss and recently lost a baby at 23 weeks gestation.  She has been dealing with seizures of unknown etiology and also has a diagnosis of lupus and fibromyalgia.  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  As she has recently been pregnant, her endocrinologist is waiting for her hormone levels to return to normal before further evaluation and  treatment of this.  She has not started routine breast imaging.  She denies any history of breast biopsies and denies any current breast-related complaints.  She was 14 at menarche and is nulliparous.  She has her ovaries and her uterus intact and is pe-menopausal.  She has had a colonoscopy and reports a personal history of 6 colonic polyps to date.  She denies any thyroid issues, uterine fibroids or current dermatological concerns.  She denies any use of hormone replacement therapy or oral contraceptive use.  She denies any tobacco use or regular consumption of alcoholic beverages.  Summary:   We discussed hereditary cancer with Ms. Vance because of her family history.  Most cancer is not hereditary; however, hereditary cancer is suspected under certain conditions, such as when certain cancers occurs prior to the age of 50 (or premenopausal), when there are multiple affected family members, when breast cancer occurs in combination with other cancers, especially ovarian cancer, and when cancer appears to be passing from generation to generation.  We did review that NET can be associated with several different hereditary cancer predisposition syndromes.    We discussed dominant inheritance, the pattern in which most hereditary cancer conditions are inherited.  If an individual has such a cancer predisposing gene mutation, there is a 50% chance that any offspring will not inherit the mutation and a 50% chance that he or she will inherit it.  Inheriting the mutation does not automatically mean that one will develop cancer, rather that there is an increased chance for developing certain types of cancer.  Cancer predisposing gene mutations can exist in males and females and can be passed on to both male and female offspring.  The pros and cons of cancer predisposing gene mutation testing were reviewed with Ms. Vance.  Genetic test results can have significant impact on medical management, planning, screening,  surgical decision-making, cancer risk assessment for the patient and relatives, and psychological/emotional well-being.  Mutations in the genes BRCA1 and BRCA2 account for most (but not all) cases of hereditary breast cancer.  Mutations in other genes have also been associated with an increased risk for breast cancer - but mutations in these other genes are statistically less often seen in hereditary breast cancer.  Different genes are associated with gastrointestinal NET, including MEN1 and VHL.  Early-onset colon cancers may be associated with the Reynodls syndrome genes (MLH1, MSH2, MSH6, PMS2 and EPCAM).  We briefly discussed the benefits and limitations of focused testing versus multi-gene panels that include these more focused genes.  Panels are an appropriate option for individuals whose history is suggestive of more than one syndrome, and they improve detection rate for identifying the underlying cause of a hereditary cancer.  Limitations of panels include an unknown percentage of variants of unknown significance, as well as an uncertainty of level of risk associated with certain unknown-penetrance genes, and therefore lack of clear guidelines for risk management of carriers of some of these mutations.  There are panels that assess for mutations in only “high risk” and clinically actionable cancer genes as well as larger panels that assess for mutations in high, moderate and unknown-penetrance cancer genes.  Genetic testing could yield one of three results: no mutation detected, deleterious mutation detected, or variant of uncertain significance.  The implications of these potential results were reviewed with Ms. Vance.  The optimal testing strategy is to test an affected family member first.  We discussed the limitations of interpreting test results of an unaffected individual.  Specifically, a negative result in an unaffected individual is uninformative unless a known mutation has been identified in an  affected relative.  Another option is testing Ms. Vance rather than an affected relative, and we reviewed the limitations of this testing, including concerns about discrimination by life insurers, long term healthcare or disability, which are not covered by statutes yet.   Given her family history of early-onset breast cancer and colon cancer and given her personal history of an early-onset GI NET, she does meet NCCN guidelines for HBOC testing and consideration for other hereditary cancer syndromes, including neuroendocrine and colon syndromes.    After discussing the multiple testing options, Ms. Vance decided that she would like to pursue a multi-gene panel which includes both DNA and RNA ([facility] Multi-Cancer Panel+RNA, 70 genes).  The blood was drawn today and sent to [facility].  Turn-around-time is approximately two to three weeks for the testing.  Our office will call Ms. Vance as soon as results are received; post-test counseling can be scheduled at that time.  Plan:  Ordered [facility] Multi-Cancer Panel+RNA (70 genes) through [email or web address].  The Genetics office will call Ms. Vance when results are received.  Post-test counseling can be scheduled at that time.  Recommendations for Ms. Vance and family members will depend on above test results.  Thank you for referring Ms. Vance for genetic counseling; please do not hesitate to contact our office if you have any questions or concerns, [phone].  Send to: REJI Joseph  Time spent with patient: 60 minutes

## 2024-09-11 NOTE — H&P
New Patient Evaluation - History and Physical    CONSULT - Reason for Visit:  Pituitary Adenoma.  Requesting Physician: Self-Referral.    CHIEF COMPLAINT:    Chief Complaint   Patient presents with    Consult     Pituitary gland tumor         HISTORY OF PRESENT ILLNESS:   Kaylie Vance is a 23 year old female who presents with the possibility of a pituitary adenoma that was seen on CT scan of the head, during work-up for seizures at an outside institution.     She has also been diagnosed with a neuroendocrine/ carcinoid tumor. This was removed by surgery recently. It had stained for synaptophysin, chromogranin.     Her paternal grandfather's sister also had a pituitary adenoma, and her paternal aunt also has a pituitary adenoma. Her paternal grandfather had colon cancer and he passed away from this.     PAST MEDICAL HISTORY:   Past Medical History:    Anxiety    Depression    Fibromyalgia    Lupus (HCC)    Neuroendocrine tumor (HCC)    PONV (postoperative nausea and vomiting)    Seizure disorder (HCC)       PAST SURGICAL HISTORY:   No past surgical history on file.    SOCIAL HISTORY:    Social History     Socioeconomic History    Marital status: Single   Tobacco Use    Smoking status: Never     Passive exposure: Never    Smokeless tobacco: Never   Vaping Use    Vaping status: Never Used   Substance and Sexual Activity    Alcohol use: Never    Drug use: Never     Social Determinants of Health     Financial Resource Strain: Medium Risk (8/6/2024)    Received from [facility]    Overall Financial Resource Strain (CARDIA)     Difficulty of Paying Living Expenses: Somewhat hard   Food Insecurity: No Food Insecurity (8/6/2024)    Received from [facility]    Hunger Vital Sign     Worried About Running Out of Food in the Last Year: Never true     Ran Out of Food in the Last Year: Never true   Transportation Needs: No Transportation Needs (8/6/2024)    Received from [facility]    PRAPARE - Transportation     Lack of Transportation (Medical): No     Lack of Transportation (Non-Medical): No   Recent Concern: Transportation Needs - Unmet Transportation Needs (7/8/2024)    Transportation Needs     Lack of Transportation: Yes   Physical Activity: Insufficiently Active (8/6/2024)    Received from [facility]    Exercise Vital Sign     Days of Exercise per Week: 3 days     Minutes of Exercise per Session: 30 min   Stress: No Stress Concern Present (8/6/2024)    Received from [facility]    [employer] [place] [employer] - Occupational Stress Questionnaire     Feeling of Stress : Not at all   Recent Concern: Stress - Stress Concern Present (7/8/2024)    Stress     Feeling of Stress : Yes   Social Connections: Low Risk  (7/4/2024)    Received from [facility]    Social Connections     How often do you see or talk to people that you care about and feel close to? (For example: talking to friends on the phone, visiting friends or family, going to [employer] or club meetings): 5 or more times a week   Housing Stability: Unknown (8/6/2024)    Received from [facility]    Housing Stability Vital Sign     Unable to Pay for Housing in the Last Year: No     In the last 12 months, was there a time when you did not have a steady place to sleep or slept in a shelter (including now)?: No   Recent Concern: Housing Stability - Medium Risk (7/8/2024)    Housing Stability     Housing Instability: No     Crib or [place]inette: No       FAMILY HISTORY:   Family History   Problem Relation Age of Onset    Heart Attack Father     Anxiety Mother     Cancer Maternal Grandmother 50        breast; thyroid @ 60    Diabetes Maternal Grandmother     Heart Disease Maternal Grandfather     Cancer Paternal Grandmother 67        breast    Diabetes Paternal Grandmother     Cancer Paternal Grandfather 35        colon ca    Heart Attack Paternal  Grandfather     Other (pituitary tumor) Paternal Grandfather     Other (Other) Paternal Aunt         pituitary tumor       CURRENT MEDICATIONS:    Current Outpatient Medications   Medication Sig Dispense Refill    Budesonide (PULMICORT FLEXHALER) 90 MCG/ACT Inhalation Aerosol Powder, Breath Activated Inhale 2 puffs into the lungs 2 (two) times daily. 3 each 3    levETIRAcetam 1000 MG Oral Tab Take 1 tablet (1,000 mg total) by mouth 2 (two) times daily. 180 tablet 3    Potassium Chloride ER 10 MEQ Oral Tab CR Take 1 tablet (10 mEq total) by mouth daily. 30 tablet 0    metFORMIN  MG Oral Tablet 24 Hr Take 1 tablet (500 mg total) by mouth daily. 30 tablet 3    albuterol 108 (90 Base) MCG/ACT Inhalation Aero Soln Inhale 2 puffs into the lungs every 4 (four) hours as needed for Wheezing or Shortness of Breath. (Patient not taking: Reported on 7/19/2024) 18 g 5    Spacer/Aero-Holding Chambers Does not apply Device Use with hfa inhaler as directed (Patient not taking: Reported on 7/19/2024) 1 each 0    Ferrous Sulfate 325 (65 Fe) MG Oral Tab Take 1 tablet (325 mg total) by mouth daily with breakfast. 30 tablet 1    Cholecalciferol 50 MCG (2000 UT) Oral Tab Take 1 tablet (2,000 Units total) by mouth As Directed.      Cyanocobalamin 2500 MCG Oral Tab Take by mouth As Directed. (Patient not taking: Reported on 7/19/2024)      folic acid 400 MCG Oral Tab Take 1 tablet (400 mcg total) by mouth As Directed.         ALLERGIES:  Allergies   Allergen Reactions    Amoxicillin HIVES    Amoxicillin-Pot Clavulanate HIVES, DIARRHEA, RASH and NAUSEA AND VOMITING     Augmentin    Penicillins DIARRHEA, HIVES, NAUSEA ONLY, RASH, FEVER and NAUSEA AND VOMITING     Other reaction(s): GI Symptoms, Hives   Other reaction(s): GI Symptoms   Other reaction(s): Hives    Sulfa Antibiotics DIARRHEA, HIVES, RASH, UNKNOWN, FEVER and NAUSEA AND VOMITING     Other reaction(s): Unknown    Tomato HIVES, NAUSEA ONLY and NAUSEA AND VOMITING    Wandy  Protect RASH    Dimethicone-Zinc Oxide RASH    Zinc Oxide DIARRHEA and RASH         ASSESSMENTS:     REVIEW OF SYSTEMS:  Constitutional: Negative for: weight change, fever, fatigue, cold/heat intolerance  Eyes: Negative for:  Visual changes, proptosis, blurring  ENT: Negative for:  dysphagia, neck swelling, dysphonia  Respiratory: Negative for:  dyspnea, cough  Cardiovascular: Negative for:  chest pain, palpitations, orthopnea  GI: Negative for:  abdominal pain, nausea, vomiting, diarrhea, constipation, bleeding  Neurology: Negative for: headache, numbness, weakness  Genito-Urinary: Negative for: dysuria, frequency  Psychiatric: Negative for:  depression, anxiety  Hematology/Lymphatics: Negative for: bruising, lower extremity edema  Endocrine: Negative for: polyuria, polydypsia  Skin: Negative for: rash, blister, cellulitis,      PHYSICAL EXAM:   Vitals:    09/11/24 1733   Weight: 195 lb (88.5 kg)   Height: 5' 2\" (1.575 m)     BMI: Body mass index is 35.67 kg/m².     CONSTITUTIONAL:  awake, alert, cooperative, no apparent distress, and appears stated age  PSYCH: normal affect  EYES:  No proptosis, no ptosis, conjunctiva normal  SKIN:  no bruising or bleeding, no rashes and no lesions  EXTREMITIES: no edema      DATA:   Reviewed    ASSESSMENT AND PLAN: This is a 23 year-old woman here for evaluation and management of a possible pituitary adenoma that was discovered during work-up for seizures. I would like to perform a pituitary MRI as well as perform a hormonal evaluation. In addition, she has family history of pituitary adenoma and personal history of NET. I would like to also evaluate her for primary hyperparathyroidism. I would also like to refer her to an ophthalmologist so that she can have visual field testing done.     She will come back in 6 weeks to review all the blood tests as well as the pituitary MRI.     Prior to this encounter, I spent over 20 minutes with preparing for the visit, reviewing documents  from other specialties as well as from PCP, and going over test results and imaging studies. During the face to face encounter, I spent an additional 30 minutes which were determined for history-taking, physical examination, and orientation regarding our services. Greater than 50% of the time was spent in counseling, anticipatory guidance, and coordination of care. Patient concerns were answered to the best of my knowledge.           9/11/2024  Grecia Payne MD

## 2024-09-11 NOTE — TELEPHONE ENCOUNTER
Current Outpatient Medications:     Budesonide (PULMICORT FLEXHALER) 90 MCG/ACT Inhalation Aerosol Powder, Breath Activated, Inhale 2 puffs into the lungs 2 (two) times daily., Disp: 3 each, Rfl: 3      PRODUCT IS NOT CARRIED  UNAVAILABLE

## 2024-09-13 ENCOUNTER — OFFICE VISIT (OUTPATIENT)
Dept: FAMILY MEDICINE CLINIC | Facility: CLINIC | Age: 24
End: 2024-09-13

## 2024-09-13 VITALS
WEIGHT: 195 LBS | SYSTOLIC BLOOD PRESSURE: 105 MMHG | DIASTOLIC BLOOD PRESSURE: 71 MMHG | BODY MASS INDEX: 35.88 KG/M2 | HEART RATE: 96 BPM | HEIGHT: 62 IN

## 2024-09-13 DIAGNOSIS — J45.41 MODERATE PERSISTENT ASTHMA WITH ACUTE EXACERBATION (HCC): ICD-10-CM

## 2024-09-13 DIAGNOSIS — G40.309 GENERALIZED EPILEPSY (HCC): ICD-10-CM

## 2024-09-13 DIAGNOSIS — D35.2 PITUITARY ADENOMA (HCC): Primary | ICD-10-CM

## 2024-09-13 PROCEDURE — 3078F DIAST BP <80 MM HG: CPT | Performed by: NURSE PRACTITIONER

## 2024-09-13 PROCEDURE — 3074F SYST BP LT 130 MM HG: CPT | Performed by: NURSE PRACTITIONER

## 2024-09-13 PROCEDURE — 96372 THER/PROPH/DIAG INJ SC/IM: CPT | Performed by: NURSE PRACTITIONER

## 2024-09-13 PROCEDURE — 99214 OFFICE O/P EST MOD 30 MIN: CPT | Performed by: NURSE PRACTITIONER

## 2024-09-13 PROCEDURE — 3008F BODY MASS INDEX DOCD: CPT | Performed by: NURSE PRACTITIONER

## 2024-09-13 RX ORDER — METHYLPREDNISOLONE ACETATE 80 MG/ML
80 INJECTION, SUSPENSION INTRA-ARTICULAR; INTRALESIONAL; INTRAMUSCULAR; SOFT TISSUE ONCE
Status: COMPLETED | OUTPATIENT
Start: 2024-09-13 | End: 2024-09-13

## 2024-09-13 RX ORDER — MONTELUKAST SODIUM 10 MG/1
10 TABLET ORAL DAILY
Qty: 90 TABLET | Refills: 3 | Status: SHIPPED | OUTPATIENT
Start: 2024-09-13 | End: 2024-09-13 | Stop reason: SDUPTHER

## 2024-09-13 RX ORDER — ALBUTEROL SULFATE 90 UG/1
2 AEROSOL, METERED RESPIRATORY (INHALATION) EVERY 4 HOURS PRN
Qty: 18 G | Refills: 5 | Status: SHIPPED | OUTPATIENT
Start: 2024-09-13

## 2024-09-13 RX ORDER — MONTELUKAST SODIUM 10 MG/1
10 TABLET ORAL DAILY
Qty: 90 TABLET | Refills: 3 | Status: SHIPPED | OUTPATIENT
Start: 2024-09-13 | End: 2025-09-08

## 2024-09-13 RX ORDER — FLUTICASONE PROPIONATE AND SALMETEROL 250; 50 UG/1; UG/1
1 POWDER RESPIRATORY (INHALATION) 2 TIMES DAILY
Qty: 1 EACH | Refills: 0 | Status: SHIPPED | OUTPATIENT
Start: 2024-09-13

## 2024-09-13 RX ADMIN — METHYLPREDNISOLONE ACETATE 80 MG: 80 INJECTION, SUSPENSION INTRA-ARTICULAR; INTRALESIONAL; INTRAMUSCULAR; SOFT TISSUE at 15:30:00

## 2024-09-13 NOTE — PROGRESS NOTES
HPI  Pt here for follow up seizures, surgery to remove neuroendocrine tumor.   Pt states he has been feeling really weak. Feels like she needs a lot of help at home.   Is coughing a lot. Can't get albuterol.  Having some urinary incontinence.     Having a lot of body shaking. Has had 4 seizures in the past 2 weeks.     Was seen by genetic counselor on Tuesday. Pat aunt has pituitary tumor. Pat great aunt had pituitary tumor. Pat GF and father- of colon ca.     On Wed saw endocrinology.  Has tumor on pituitary.  Needs to see ophthalmology, have an mri and needs labs. Having blurry vision that seems to be worsening.     Is not taking allergy medicine.     Had one episode of bloody stool after procedure-no bleeding since.      Most of her specialists are out of [facility]  Review of Systems   Constitutional:  Positive for activity change and fatigue.   HENT:  Positive for congestion and rhinorrhea.    Respiratory:  Positive for cough, chest tightness, shortness of breath and wheezing.    Cardiovascular:  Negative for chest pain.   Gastrointestinal:  Positive for blood in stool (bloody stool once after tumor removal-has not f/u with surgeron). Negative for abdominal pain.   Genitourinary:         + stress urinary incontinece   Neurological:  Positive for seizures and weakness.       Vitals:    24 1502   BP: 105/71   Pulse: 96   Weight: 195 lb (88.5 kg)   Height: 5' 2\" (1.575 m)     Body mass index is 35.67 kg/m².  Wt Readings from Last 6 Encounters:   24 195 lb (88.5 kg)   24 195 lb (88.5 kg)   24 193 lb (87.5 kg)   24 191 lb (86.6 kg)   24 195 lb (88.5 kg)   24 195 lb (88.5 kg)        Health Maintenance   Topic Date Due    Asthma Control Test  Never done    Pneumococcal Vaccine: Birth to 64yrs (1 of 2 - PCV) 2006    Zoster Vaccines (1 of 2) Never done    DTaP,Tdap,and Td Vaccines (7 - Td or Tdap) 2022    HPV Vaccines (2 - Risk 3-dose series) 2023     Annual Depression Screening  01/01/2024    Chlamydia Screening  08/23/2024    Annual Physical  08/23/2024    COVID-19 Vaccine (2 - 2023-24 season) 09/01/2024    Influenza Vaccine (1) 10/01/2024    Pap Smear  08/23/2026       Patient's last menstrual period was 01/28/2024 (approximate).    Past Medical History:    Anxiety    Depression    Fibromyalgia    Lupus (HCC)    Neuroendocrine tumor (HCC)    PONV (postoperative nausea and vomiting)    Seizure disorder (HCC)       .No past surgical history on file.    Family History   Problem Relation Age of Onset    Heart Attack Father     Anxiety Mother     Cancer Maternal Grandmother 50        breast; thyroid @ 60    Diabetes Maternal Grandmother     Heart Disease Maternal Grandfather     Cancer Paternal Grandmother 67        breast    Diabetes Paternal Grandmother     Cancer Paternal Grandfather 35        colon ca    Heart Attack Paternal Grandfather     Other (pituitary tumor) Paternal Grandfather     Other (Other) Paternal Aunt         pituitary tumor       Social History     Socioeconomic History    Marital status: Single     Spouse name: Not on file    Number of children: Not on file    Years of education: Not on file    Highest education level: Not on file   Occupational History    Not on file   Tobacco Use    Smoking status: Never     Passive exposure: Never    Smokeless tobacco: Never   Vaping Use    Vaping status: Never Used   Substance and Sexual Activity    Alcohol use: Never    Drug use: Never    Sexual activity: Not on file   Other Topics Concern    Not on file   Social History Narrative    Not on file     Social Determinants of Health     Financial Resource Strain: Medium Risk (8/6/2024)    Received from [facility]    Overall Financial Resource Strain (CARDIA)     Difficulty of Paying Living Expenses: Somewhat hard   Food Insecurity: No Food Insecurity (8/6/2024)    Received from [facility]    Hunger Vital Sign      Worried About Running Out of Food in the Last Year: Never true     Ran Out of Food in the Last Year: Never true   Transportation Needs: No Transportation Needs (8/6/2024)    Received from [facility]    PRAPARE - Transportation     Lack of Transportation (Medical): No     Lack of Transportation (Non-Medical): No   Recent Concern: Transportation Needs - Unmet Transportation Needs (7/8/2024)    Transportation Needs     Lack of Transportation: Yes     Car Seat: Not on file   Physical Activity: Insufficiently Active (8/6/2024)    Received from [facility]    Exercise Vital Sign     Days of Exercise per Week: 3 days     Minutes of Exercise per Session: 30 min   Stress: No Stress Concern Present (8/6/2024)    Received from [facility]    Citizen of [place] [employer] - Occupational Stress Questionnaire     Feeling of Stress : Not at all   Recent Concern: Stress - Stress Concern Present (7/8/2024)    Stress     Feeling of Stress : Yes   Social Connections: Low Risk  (7/4/2024)    Received from [facility]    Social Connections     How often do you see or talk to people that you care about and feel close to? (For example: talking to friends on the phone, visiting friends or family, going to Druze or club meetings): 5 or more times a week   Housing Stability: Unknown (8/6/2024)    Received from [facility]    Housing Stability Vital Sign     Unable to Pay for Housing in the Last Year: No     Number of Places Lived in the Last Year: Not on file     In the last 12 months, was there a time when you did not have a steady place to sleep or slept in a shelter (including now)?: No   Recent Concern: Housing Stability - Medium Risk (7/8/2024)    Housing Stability     Housing Instability: No     Housing Instability Emergency: Not on file     Evonne or Mariela: No       Current Outpatient Medications   Medication Sig Dispense Refill     fluticasone-salmeterol 250-50 MCG/ACT Inhalation Aerosol Powder, Breath Activated Inhale 1 puff into the lungs 2 (two) times daily. 1 each 0    albuterol 108 (90 Base) MCG/ACT Inhalation Aero Soln Inhale 2 puffs into the lungs every 4 (four) hours as needed for Wheezing or Shortness of Breath. Please dispense covered albuterol. 18 g 5    montelukast (SINGULAIR) 10 MG Oral Tab Take 1 tablet (10 mg total) by mouth daily. 90 tablet 3    levETIRAcetam 1000 MG Oral Tab Take 1 tablet (1,000 mg total) by mouth 2 (two) times daily. 180 tablet 3    metFORMIN  MG Oral Tablet 24 Hr Take 1 tablet (500 mg total) by mouth daily. 30 tablet 3    Ferrous Sulfate 325 (65 Fe) MG Oral Tab Take 1 tablet (325 mg total) by mouth daily with breakfast. 30 tablet 1    Cholecalciferol 50 MCG (2000 UT) Oral Tab Take 1 tablet (2,000 Units total) by mouth As Directed.      Cyanocobalamin 2500 MCG Oral Tab Take by mouth As Directed.      folic acid 400 MCG Oral Tab Take 1 tablet (400 mcg total) by mouth As Directed.      Potassium Chloride ER 10 MEQ Oral Tab CR Take 1 tablet (10 mEq total) by mouth daily. (Patient not taking: Reported on 9/13/2024) 30 tablet 0    Spacer/Aero-Holding Chambers Does not apply Device Use with hfa inhaler as directed (Patient not taking: Reported on 7/19/2024) 1 each 0       Allergies:  Allergies   Allergen Reactions    Amoxicillin HIVES    Amoxicillin-Pot Clavulanate HIVES, DIARRHEA, RASH and NAUSEA AND VOMITING     Augmentin    Penicillins DIARRHEA, HIVES, NAUSEA ONLY, RASH, FEVER and NAUSEA AND VOMITING     Other reaction(s): GI Symptoms, Hives   Other reaction(s): GI Symptoms   Other reaction(s): Hives    Sulfa Antibiotics DIARRHEA, HIVES, RASH, UNKNOWN, FEVER and NAUSEA AND VOMITING     Other reaction(s): Unknown    Tomato HIVES, NAUSEA ONLY and NAUSEA AND VOMITING    Wandy Protect RASH    Dimethicone-Zinc Oxide RASH    Zinc Oxide DIARRHEA and RASH       Physical Exam  Vitals and nursing note reviewed.    Constitutional:       Appearance: She is obese.   HENT:      Head: Normocephalic.      Right Ear: Tympanic membrane, ear canal and external ear normal.      Left Ear: Tympanic membrane, ear canal and external ear normal.      Nose: Congestion and rhinorrhea present.      Comments: Pale, boggy turbinates bilaterally; clear rhinorrhea present       Mouth/Throat:      Mouth: Mucous membranes are moist.      Pharynx: Oropharynx is clear. No oropharyngeal exudate or posterior oropharyngeal erythema.   Eyes:      Conjunctiva/sclera: Conjunctivae normal.   Cardiovascular:      Rate and Rhythm: Normal rate and regular rhythm.      Heart sounds: Normal heart sounds.   Pulmonary:      Effort: Pulmonary effort is normal. No respiratory distress.      Breath sounds: Normal breath sounds.   Abdominal:      General: Bowel sounds are normal. There is no distension.      Palpations: Abdomen is soft.      Tenderness: There is no abdominal tenderness.   Skin:     General: Skin is warm and dry.   Neurological:      Mental Status: She is alert and oriented to person, place, and time.   Psychiatric:         Mood and Affect: Mood normal.         Behavior: Behavior normal.         Assessment and Plan:  Problem List Items Addressed This Visit       Generalized epilepsy (HCC)     Continue present management  Follow up w neuro  Has follow up w endo for pituitary tumor         Moderate persistent asthma with acute exacerbation ([facility])     Treat allergy symptoms  Add advair and singulair  Refill albuterol  Please call if symptoms worsen or are not resolving.           Relevant Medications    fluticasone-salmeterol 250-50 MCG/ACT Inhalation Aerosol Powder, Breath Activated    albuterol 108 (90 Base) MCG/ACT Inhalation Aero Soln    montelukast (SINGULAIR) 10 MG Oral Tab    Pituitary adenoma (HCC) - Primary                   Discussed plan of care with pt and pt is in agreement.All questions answered. Pt to call with questions or  concerns.    Encouraged to sign up for My Chart if not already registered.     Note to patient and family:  The 21st Century Cures Act makes medical notes available to patients in the interest of transparency.  However, please be advised that this is a medical document.  It is intended as a peer to peer communication.  It is written in medical language and may contain abbreviations or verbiage that are technical and unfamiliar.  It may appear blunt or direct.  Medical documents are intended to carry relevant information, facts as evident, and the clinical opinion of the practitioner.

## 2024-09-15 PROBLEM — J45.40 MODERATE PERSISTENT ASTHMA WITHOUT COMPLICATION (HCC): Status: RESOLVED | Noted: 2024-06-19 | Resolved: 2024-09-15

## 2024-09-15 PROBLEM — J45.41 MODERATE PERSISTENT ASTHMA WITH ACUTE EXACERBATION (HCC): Status: ACTIVE | Noted: 2024-09-15

## 2024-09-15 NOTE — ASSESSMENT & PLAN NOTE
Treat allergy symptoms  Add advair and singulair  Refill albuterol  Please call if symptoms worsen or are not resolving.

## 2024-09-17 ENCOUNTER — OFFICE VISIT (OUTPATIENT)
Dept: FAMILY MEDICINE CLINIC | Facility: CLINIC | Age: 24
End: 2024-09-17
Payer: COMMERCIAL

## 2024-09-17 VITALS
RESPIRATION RATE: 18 BRPM | SYSTOLIC BLOOD PRESSURE: 110 MMHG | TEMPERATURE: 97 F | BODY MASS INDEX: 35.88 KG/M2 | HEART RATE: 82 BPM | HEIGHT: 62 IN | OXYGEN SATURATION: 98 % | WEIGHT: 195 LBS | DIASTOLIC BLOOD PRESSURE: 50 MMHG

## 2024-09-17 DIAGNOSIS — N92.6 IRREGULAR PERIODS: Primary | ICD-10-CM

## 2024-09-17 DIAGNOSIS — K08.89 TOOTH PAIN: ICD-10-CM

## 2024-09-17 LAB
CONTROL LINE PRESENT WITH A CLEAR BACKGROUND (YES/NO): YES YES/NO
KIT LOT #: NORMAL NUMERIC

## 2024-09-17 PROCEDURE — 3008F BODY MASS INDEX DOCD: CPT | Performed by: NURSE PRACTITIONER

## 2024-09-17 PROCEDURE — 99213 OFFICE O/P EST LOW 20 MIN: CPT | Performed by: NURSE PRACTITIONER

## 2024-09-17 PROCEDURE — 81025 URINE PREGNANCY TEST: CPT | Performed by: NURSE PRACTITIONER

## 2024-09-17 PROCEDURE — 3078F DIAST BP <80 MM HG: CPT | Performed by: NURSE PRACTITIONER

## 2024-09-17 PROCEDURE — 3074F SYST BP LT 130 MM HG: CPT | Performed by: NURSE PRACTITIONER

## 2024-09-17 RX ORDER — DOXYCYCLINE HYCLATE 100 MG
100 TABLET ORAL 2 TIMES DAILY
Qty: 14 TABLET | Refills: 0 | Status: SHIPPED | OUTPATIENT
Start: 2024-09-17 | End: 2024-09-24

## 2024-09-17 NOTE — PROGRESS NOTES
Subjective:   Patient ID: Kaylie Vance is a 23 year old female.    Pt has significant medical history including RA, lupus, and seizure disorder. Pt has recently had surgery for neuroendocrine tumor. Pt was also found to have a pituitary adenoma that is being monitored. Pt self reports history of recent twin pregnancy miscarriage, states she received care through [facility], unable to chart review for prenatal care or positive pregnancy testing at this time. Pt states that she has not had a period since July, 2024, when she states she miscarried. Pt denies vaginal bleeding at this time but reports episodes of rectal bleeding following her neuroendocrine tumor resection, on chart review, the tumor was located approx 20 cm from rectum. Pt states that she did go to the ER for bloody stool but that the ER lost her blood work and she left with out a work or treatment.     Pt is here for tooth pain and states that the area of the TMJ on the right side is tender to touch and causing some ear pain. Pt denies pain with chewing. Pt denies gum swelling, bleeding, or purulent drainage. Pt states that she has an appt scheduled with her dentist at the beginning of October. Denies fever, chills, and flu-like illness.      History/Other:   Review of Systems   Constitutional:  Negative for chills and fever.   HENT:  Positive for dental problem, ear pain, rhinorrhea, sinus pressure and sore throat. Negative for congestion, drooling, ear discharge, facial swelling, hearing loss and trouble swallowing.    Eyes:  Negative for discharge and redness.   Respiratory:  Negative for cough.    Gastrointestinal:  Negative for abdominal pain.   Skin:  Negative for rash.   Neurological:  Negative for headaches.   All other systems reviewed and are negative.    Current Outpatient Medications   Medication Sig Dispense Refill    Doxycycline Hyclate 100 MG Oral Tab Take 1 tablet (100 mg total) by mouth 2  (two) times daily for 7 days. 14 tablet 0    fluticasone-salmeterol 250-50 MCG/ACT Inhalation Aerosol Powder, Breath Activated Inhale 1 puff into the lungs 2 (two) times daily. 1 each 0    albuterol 108 (90 Base) MCG/ACT Inhalation Aero Soln Inhale 2 puffs into the lungs every 4 (four) hours as needed for Wheezing or Shortness of Breath. Please dispense covered albuterol. 18 g 5    montelukast (SINGULAIR) 10 MG Oral Tab Take 1 tablet (10 mg total) by mouth daily. 90 tablet 3    levETIRAcetam 1000 MG Oral Tab Take 1 tablet (1,000 mg total) by mouth 2 (two) times daily. 180 tablet 3    Potassium Chloride ER 10 MEQ Oral Tab CR Take 1 tablet (10 mEq total) by mouth daily. (Patient not taking: Reported on 9/13/2024) 30 tablet 0    metFORMIN  MG Oral Tablet 24 Hr Take 1 tablet (500 mg total) by mouth daily. 30 tablet 3    Spacer/Aero-Holding Chambers Does not apply Device Use with hfa inhaler as directed (Patient not taking: Reported on 7/19/2024) 1 each 0    Ferrous Sulfate 325 (65 Fe) MG Oral Tab Take 1 tablet (325 mg total) by mouth daily with breakfast. 30 tablet 1    Cholecalciferol 50 MCG (2000 UT) Oral Tab Take 1 tablet (2,000 Units total) by mouth As Directed.      Cyanocobalamin 2500 MCG Oral Tab Take by mouth As Directed.      folic acid 400 MCG Oral Tab Take 1 tablet (400 mcg total) by mouth As Directed.       Allergies:  Allergies   Allergen Reactions    Amoxicillin HIVES    Amoxicillin-Pot Clavulanate HIVES, DIARRHEA, RASH and NAUSEA AND VOMITING     Augmentin    Penicillins DIARRHEA, HIVES, NAUSEA ONLY, RASH, FEVER and NAUSEA AND VOMITING     Other reaction(s): GI Symptoms, Hives   Other reaction(s): GI Symptoms   Other reaction(s): Hives    Sulfa Antibiotics DIARRHEA, HIVES, RASH, UNKNOWN, FEVER and NAUSEA AND VOMITING     Other reaction(s): Unknown    Tomato HIVES, NAUSEA ONLY and NAUSEA AND VOMITING    Wandy Protect RASH    Dimethicone-Zinc Oxide RASH    Zinc Oxide DIARRHEA and RASH       Objective:    Physical Exam  Vitals and nursing note reviewed.   Constitutional:       Appearance: Normal appearance. She is not ill-appearing.   HENT:      Head: Normocephalic and atraumatic.      Right Ear: Hearing, tympanic membrane, ear canal and external ear normal. No drainage. There is no impacted cerumen. Tympanic membrane is not injected, perforated, erythematous or bulging.      Left Ear: Hearing, tympanic membrane, ear canal and external ear normal. No drainage. There is no impacted cerumen. Tympanic membrane is not injected, perforated, erythematous or bulging.      Nose: Nose normal. No mucosal edema, congestion or rhinorrhea.      Right Sinus: No maxillary sinus tenderness or frontal sinus tenderness.      Left Sinus: No maxillary sinus tenderness or frontal sinus tenderness.      Mouth/Throat:      Lips: No lesions.      Mouth: Mucous membranes are moist. No oral lesions.      Dentition: Abnormal dentition. Dental caries present. No gingival swelling, dental abscesses or gum lesions.      Palate: No lesions.      Pharynx: Oropharynx is clear. Uvula midline. No oropharyngeal exudate or posterior oropharyngeal erythema.      Tonsils: No tonsillar exudate or tonsillar abscesses. 1+ on the right. 1+ on the left.        Comments: Area where pt describes tooth pain, no gingival swelling noted, no drainage noted. Pt does have disrupted dentition, with various past fillings and dental work.   Eyes:      General: No scleral icterus.        Right eye: No discharge.         Left eye: No discharge.      Conjunctiva/sclera: Conjunctivae normal.   Cardiovascular:      Rate and Rhythm: Normal rate and regular rhythm.      Heart sounds: Normal heart sounds. No murmur heard.  Pulmonary:      Effort: Pulmonary effort is normal.      Breath sounds: Normal breath sounds. No wheezing.   Lymphadenopathy:      Cervical: No cervical adenopathy.   Skin:     General: Skin is warm and dry.   Neurological:      Mental Status: She is alert  and oriented to person, place, and time.   Psychiatric:         Mood and Affect: Mood normal.         Behavior: Behavior normal.         Assessment & Plan:   1. Irregular periods    2. Tooth pain        Orders Placed This Encounter   Procedures    Urine Preg Test     - We reviewed the limitations of the walk in clinic and the need for higher level of care for full work up for various issues. Pt declines and prefers to be treated for her dental pain. Pt states that tylenol does not help her pain and that she cannot take Advil due to past history of gastric ulcers. We discussed that dental infections can cause pain and offered to treat with antibiotic to attempt to reduce pain while waiting for dentist appointment.   Pt accepted. Due to pt's allergies, doxycycline is the preferred antibiotic to treat dental pain at this time. Urine pregnancy test completed due to missed menses, negative. Reviewed medication side effects and red flag signs of infection such as systemic fever, chills, and flulike symptoms and the need to report to the ER for further work up. Pt verbalized understanding.   - Reviewed that pain in the area may be caused but other ailments such as joint pain, eustachian tube dysfunction, neuralgia. Etc. And follow up with PCP is recommended.     Meds This Visit:  Requested Prescriptions     Signed Prescriptions Disp Refills    Doxycycline Hyclate 100 MG Oral Tab 14 tablet 0     Sig: Take 1 tablet (100 mg total) by mouth 2 (two) times daily for 7 days.       Imaging & Referrals:  None

## 2024-09-21 DIAGNOSIS — E88.819 INSULIN RESISTANCE: ICD-10-CM

## 2024-09-21 DIAGNOSIS — E66.9 OBESITY (BMI 30-39.9): ICD-10-CM

## 2024-09-23 RX ORDER — METFORMIN HCL 500 MG
500 TABLET, EXTENDED RELEASE 24 HR ORAL DAILY
Qty: 90 TABLET | Refills: 0 | Status: SHIPPED | OUTPATIENT
Start: 2024-09-23

## 2024-09-27 ENCOUNTER — TELEPHONE (OUTPATIENT)
Dept: GENETICS | Facility: HOSPITAL | Age: 24
End: 2024-09-27

## 2024-09-27 NOTE — TELEPHONE ENCOUNTER
VIRAJ for Kaylie with NEGATIVE genetic testing results. She opted for 70 gene panel+RNA through [facility] ([facility]Cancer Panel+RNA). Released results to her through lab's portal and will mail her a copy as well. Encouraged her to reach out to me with any questions and shared my contact information.

## 2024-10-07 ENCOUNTER — TELEPHONE (OUTPATIENT)
Dept: NEUROLOGY | Facility: CLINIC | Age: 24
End: 2024-10-07

## 2024-10-09 ENCOUNTER — TELEPHONE (OUTPATIENT)
Dept: ENDOCRINOLOGY CLINIC | Facility: CLINIC | Age: 24
End: 2024-10-09

## 2024-10-09 NOTE — TELEPHONE ENCOUNTER
Serena from [facility] is following up on a form faxed for surgical clearance please follow up

## 2024-10-10 NOTE — TELEPHONE ENCOUNTER
Received form from dental office and will over once provider has reviewed and filled out paper work. Pt is due for procedure 11/05/2024.

## 2024-10-15 ENCOUNTER — OFFICE VISIT (OUTPATIENT)
Dept: FAMILY MEDICINE CLINIC | Facility: CLINIC | Age: 24
End: 2024-10-15

## 2024-10-15 ENCOUNTER — ORDER TRANSCRIPTION (OUTPATIENT)
Dept: PHYSICAL THERAPY | Facility: HOSPITAL | Age: 24
End: 2024-10-15

## 2024-10-15 VITALS
HEIGHT: 62 IN | BODY MASS INDEX: 36.44 KG/M2 | WEIGHT: 198 LBS | SYSTOLIC BLOOD PRESSURE: 115 MMHG | DIASTOLIC BLOOD PRESSURE: 75 MMHG | HEART RATE: 77 BPM

## 2024-10-15 DIAGNOSIS — R26.89 BALANCE DISORDER: ICD-10-CM

## 2024-10-15 DIAGNOSIS — D3A.8 NEUROENDOCRINE TUMOR (HCC): ICD-10-CM

## 2024-10-15 DIAGNOSIS — R42 DIZZINESS: Primary | ICD-10-CM

## 2024-10-15 DIAGNOSIS — D35.2 PITUITARY ADENOMA (HCC): ICD-10-CM

## 2024-10-15 DIAGNOSIS — M08.80 JUVENILE IDIOPATHIC ARTHRITIS (HCC): ICD-10-CM

## 2024-10-15 DIAGNOSIS — M54.42 CHRONIC LEFT-SIDED LOW BACK PAIN WITH LEFT-SIDED SCIATICA: ICD-10-CM

## 2024-10-15 DIAGNOSIS — R55 SYNCOPE AND COLLAPSE: ICD-10-CM

## 2024-10-15 DIAGNOSIS — F07.81 POST CONCUSSION SYNDROME: ICD-10-CM

## 2024-10-15 DIAGNOSIS — R76.8 RHEUMATOID FACTOR POSITIVE: ICD-10-CM

## 2024-10-15 DIAGNOSIS — G89.29 CHRONIC LEFT-SIDED LOW BACK PAIN WITH LEFT-SIDED SCIATICA: ICD-10-CM

## 2024-10-15 DIAGNOSIS — R05.3 CHRONIC COUGH: Primary | ICD-10-CM

## 2024-10-15 DIAGNOSIS — N96 HISTORY OF MULTIPLE SPONTANEOUS ABORTIONS: ICD-10-CM

## 2024-10-15 PROCEDURE — 3074F SYST BP LT 130 MM HG: CPT | Performed by: NURSE PRACTITIONER

## 2024-10-15 PROCEDURE — 3008F BODY MASS INDEX DOCD: CPT | Performed by: NURSE PRACTITIONER

## 2024-10-15 PROCEDURE — 3078F DIAST BP <80 MM HG: CPT | Performed by: NURSE PRACTITIONER

## 2024-10-15 PROCEDURE — 99215 OFFICE O/P EST HI 40 MIN: CPT | Performed by: NURSE PRACTITIONER
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
## 2024-10-16 PROBLEM — K59.00 CONSTIPATION: Status: RESOLVED | Noted: 2023-07-06 | Resolved: 2024-10-16

## 2024-10-16 PROBLEM — R05.3 CHRONIC COUGH: Status: ACTIVE | Noted: 2024-10-16

## 2024-10-16 NOTE — ASSESSMENT & PLAN NOTE
Refer rheum [facility] -all specialists are out of [facility]  Discussed possible eval at [place] due to multiple and significant past medical history dx  cpm

## 2024-10-16 NOTE — ASSESSMENT & PLAN NOTE
Pt is still having episodes of syncope/seizures  Continue present management  Follow up neuro for further eval and treatment  Last EEG was negative.

## 2024-10-16 NOTE — ASSESSMENT & PLAN NOTE
Genetic testing was negative   Needs follow up w ob gyne  Encourage birth control use to prevent pregnancy until seen

## 2024-10-16 NOTE — ASSESSMENT & PLAN NOTE
Has appointment w pulm  Continue present management  PFT w albuterol  Control allergy symptoms  Please call if symptoms worsen or are not resolving.

## 2024-10-17 NOTE — TELEPHONE ENCOUNTER
The patient's dental office called to check on the status of the form for the surgical clearance. Please fax the form to [phone].

## 2024-10-24 ENCOUNTER — HOSPITAL ENCOUNTER (OUTPATIENT)
Dept: MRI IMAGING | Facility: HOSPITAL | Age: 24
Discharge: HOME | End: 2024-10-24
Attending: INTERNAL MEDICINE
Payer: COMMERCIAL

## 2024-10-24 ENCOUNTER — OFFICE VISIT (OUTPATIENT)
Age: 24
End: 2024-10-24
Payer: COMMERCIAL

## 2024-10-24 ENCOUNTER — APPOINTMENT (OUTPATIENT)
Facility: HOSPITAL | Age: 24
End: 2024-10-24
Attending: INTERNAL MEDICINE
Payer: COMMERCIAL

## 2024-10-24 ENCOUNTER — LAB ENCOUNTER (OUTPATIENT)
Dept: LAB | Facility: HOSPITAL | Age: 24
End: 2024-10-24
Attending: INTERNAL MEDICINE
Payer: COMMERCIAL

## 2024-10-24 VITALS
HEIGHT: 62 IN | RESPIRATION RATE: 16 BRPM | WEIGHT: 198 LBS | DIASTOLIC BLOOD PRESSURE: 74 MMHG | OXYGEN SATURATION: 99 % | HEART RATE: 88 BPM | BODY MASS INDEX: 36.44 KG/M2 | SYSTOLIC BLOOD PRESSURE: 118 MMHG

## 2024-10-24 DIAGNOSIS — D35.2 PITUITARY ADENOMA (HCC): ICD-10-CM

## 2024-10-24 DIAGNOSIS — J45.41 MODERATE PERSISTENT ASTHMA WITH ACUTE EXACERBATION (HCC): ICD-10-CM

## 2024-10-24 DIAGNOSIS — J45.41 MODERATE PERSISTENT ASTHMA WITH ACUTE EXACERBATION (HCC): Primary | ICD-10-CM

## 2024-10-24 DIAGNOSIS — Z13.9 SCREENING DUE: ICD-10-CM

## 2024-10-24 LAB
ALBUMIN SERPL-MCNC: 4.4 G/DL (ref 3.2–4.8)
ALBUMIN/GLOB SERPL: 1.4 {RATIO} (ref 1–2)
ALP LIVER SERPL-CCNC: 98 U/L
ALT SERPL-CCNC: <7 U/L
ANION GAP SERPL CALC-SCNC: 7 MMOL/L (ref 0–18)
AST SERPL-CCNC: 10 U/L (ref ?–34)
BASOPHILS # BLD AUTO: 0.03 X10(3) UL (ref 0–0.2)
BASOPHILS NFR BLD AUTO: 0.5 %
BILIRUB SERPL-MCNC: 1.1 MG/DL (ref 0.3–1.2)
BUN BLD-MCNC: 7 MG/DL (ref 9–23)
CALCIUM BLD-MCNC: 9.6 MG/DL (ref 8.7–10.4)
CHLORIDE SERPL-SCNC: 104 MMOL/L (ref 98–112)
CHOLEST SERPL-MCNC: 125 MG/DL (ref ?–200)
CO2 SERPL-SCNC: 28 MMOL/L (ref 21–32)
CORTIS SERPL-MCNC: 9.6 UG/DL
CREAT BLD-MCNC: 0.65 MG/DL
EGFRCR SERPLBLD CKD-EPI 2021: 127 ML/MIN/1.73M2 (ref 60–?)
EOSINOPHIL # BLD AUTO: 0.12 X10(3) UL (ref 0–0.7)
EOSINOPHIL NFR BLD AUTO: 2.2 %
ERYTHROCYTE [DISTWIDTH] IN BLOOD BY AUTOMATED COUNT: 15.1 %
ESTRADIOL SERPL-MCNC: 86.3 PG/ML
FASTING PATIENT LIPID ANSWER: YES
FASTING STATUS PATIENT QL REPORTED: YES
FSH SERPL-ACNC: 5.1 MIU/ML
GLOBULIN PLAS-MCNC: 3.1 G/DL (ref 2–3.5)
GLUCOSE BLD-MCNC: 118 MG/DL (ref 70–99)
HCT VFR BLD AUTO: 34.2 %
HDLC SERPL-MCNC: 41 MG/DL (ref 40–59)
HGB BLD-MCNC: 11.2 G/DL
IMM GRANULOCYTES # BLD AUTO: 0.01 X10(3) UL (ref 0–1)
IMM GRANULOCYTES NFR BLD: 0.2 %
LDLC SERPL CALC-MCNC: 67 MG/DL (ref ?–100)
LH SERPL-ACNC: 7.8 MIU/ML
LYMPHOCYTES # BLD AUTO: 1.49 X10(3) UL (ref 1–4)
LYMPHOCYTES NFR BLD AUTO: 27.1 %
MCH RBC QN AUTO: 27.2 PG (ref 26–34)
MCHC RBC AUTO-ENTMCNC: 32.7 G/DL (ref 31–37)
MCV RBC AUTO: 83 FL
MONOCYTES # BLD AUTO: 0.13 X10(3) UL (ref 0.1–1)
MONOCYTES NFR BLD AUTO: 2.4 %
NEUTROPHILS # BLD AUTO: 3.72 X10 (3) UL (ref 1.5–7.7)
NEUTROPHILS # BLD AUTO: 3.72 X10(3) UL (ref 1.5–7.7)
NEUTROPHILS NFR BLD AUTO: 67.6 %
NONHDLC SERPL-MCNC: 84 MG/DL (ref ?–130)
OSMOLALITY SERPL CALC.SUM OF ELEC: 287 MOSM/KG (ref 275–295)
PHOSPHATE SERPL-MCNC: 3.4 MG/DL (ref 2.4–5.1)
PLATELET # BLD AUTO: 249 10(3)UL (ref 150–450)
POTASSIUM SERPL-SCNC: 3.4 MMOL/L (ref 3.5–5.1)
PROLACTIN SERPL-MCNC: 5.8 NG/ML
PROT SERPL-MCNC: 7.5 G/DL (ref 5.7–8.2)
PTH-INTACT SERPL-MCNC: 35.9 PG/ML (ref 18.5–88)
RBC # BLD AUTO: 4.12 X10(6)UL
SODIUM SERPL-SCNC: 139 MMOL/L (ref 136–145)
T4 FREE SERPL-MCNC: 1.3 NG/DL (ref 0.8–1.7)
TRIGL SERPL-MCNC: 90 MG/DL (ref 30–149)
TSI SER-ACNC: 0.95 MIU/ML (ref 0.55–4.78)
VIT B12 SERPL-MCNC: 336 PG/ML (ref 211–911)
VIT D+METAB SERPL-MCNC: 11.4 NG/ML (ref 30–100)
VLDLC SERPL CALC-MCNC: 14 MG/DL (ref 0–30)
WBC # BLD AUTO: 5.5 X10(3) UL (ref 4–11)

## 2024-10-24 PROCEDURE — 86003 ALLG SPEC IGE CRUDE XTRC EA: CPT

## 2024-10-24 PROCEDURE — 3074F SYST BP LT 130 MM HG: CPT | Performed by: INTERNAL MEDICINE

## 2024-10-24 PROCEDURE — 82785 ASSAY OF IGE: CPT

## 2024-10-24 PROCEDURE — 84146 ASSAY OF PROLACTIN: CPT | Performed by: INTERNAL MEDICINE

## 2024-10-24 PROCEDURE — 82330 ASSAY OF CALCIUM: CPT

## 2024-10-24 PROCEDURE — 82024 ASSAY OF ACTH: CPT | Performed by: INTERNAL MEDICINE

## 2024-10-24 PROCEDURE — 99204 OFFICE O/P NEW MOD 45 MIN: CPT | Performed by: INTERNAL MEDICINE

## 2024-10-24 PROCEDURE — 80061 LIPID PANEL: CPT

## 2024-10-24 PROCEDURE — 84439 ASSAY OF FREE THYROXINE: CPT | Performed by: INTERNAL MEDICINE

## 2024-10-24 PROCEDURE — 85025 COMPLETE CBC W/AUTO DIFF WBC: CPT

## 2024-10-24 PROCEDURE — 82306 VITAMIN D 25 HYDROXY: CPT

## 2024-10-24 PROCEDURE — 3008F BODY MASS INDEX DOCD: CPT | Performed by: INTERNAL MEDICINE

## 2024-10-24 PROCEDURE — A9575 INJ GADOTERATE MEGLUMI 0.1ML: HCPCS | Performed by: INTERNAL MEDICINE

## 2024-10-24 PROCEDURE — 84100 ASSAY OF PHOSPHORUS: CPT

## 2024-10-24 PROCEDURE — 36415 COLL VENOUS BLD VENIPUNCTURE: CPT

## 2024-10-24 PROCEDURE — 82533 TOTAL CORTISOL: CPT | Performed by: INTERNAL MEDICINE

## 2024-10-24 PROCEDURE — 83001 ASSAY OF GONADOTROPIN (FSH): CPT | Performed by: INTERNAL MEDICINE

## 2024-10-24 PROCEDURE — 3078F DIAST BP <80 MM HG: CPT | Performed by: INTERNAL MEDICINE

## 2024-10-24 PROCEDURE — 83002 ASSAY OF GONADOTROPIN (LH): CPT | Performed by: INTERNAL MEDICINE

## 2024-10-24 PROCEDURE — 82607 VITAMIN B-12: CPT

## 2024-10-24 PROCEDURE — 83970 ASSAY OF PARATHORMONE: CPT

## 2024-10-24 PROCEDURE — 84305 ASSAY OF SOMATOMEDIN: CPT | Performed by: INTERNAL MEDICINE

## 2024-10-24 PROCEDURE — 70553 MRI BRAIN STEM W/O & W/DYE: CPT | Performed by: INTERNAL MEDICINE

## 2024-10-24 PROCEDURE — 82670 ASSAY OF TOTAL ESTRADIOL: CPT | Performed by: INTERNAL MEDICINE

## 2024-10-24 PROCEDURE — 84443 ASSAY THYROID STIM HORMONE: CPT | Performed by: INTERNAL MEDICINE

## 2024-10-24 PROCEDURE — 80053 COMPREHEN METABOLIC PANEL: CPT

## 2024-10-24 RX ORDER — BUDESONIDE 0.5 MG/2ML
0.5 INHALANT ORAL DAILY
Qty: 120 ML | Refills: 5 | Status: SHIPPED | OUTPATIENT
Start: 2024-10-24

## 2024-10-24 RX ORDER — GADOTERATE MEGLUMINE 376.9 MG/ML
20 INJECTION INTRAVENOUS
Status: COMPLETED | OUTPATIENT
Start: 2024-10-24 | End: 2024-10-24

## 2024-10-24 RX ADMIN — GADOTERATE MEGLUMINE 20 ML: 376.9 INJECTION INTRAVENOUS at 12:40:00

## 2024-10-24 NOTE — PROGRESS NOTES
[facility] General Pulmonary Consult Note    Chief Complaint:  Chief Complaint   Patient presents with    New Patient     Pt c/o cough and dyspnea with minimal exertion and wheezing        History of Present Illness:  Kaylie Vance is a 23 year old female who presents today for evaluation of shortness of breath and wheezing.  Symptoms started this past march had worsening dyspnea which progressed to wheezing.  Given albuterol with minimal improvement of symptoms.  No history of asthma as a child.  Was recently diagnosed with neuroendocrine tumor of colon, was recently treated.  Has possibly pituitary adenoma being worked up at [facility].  Works as special .      Past Medical History:   Past Medical History:    Allergic rhinitis    Anemia    Anxiety    BRCA gene mutation negative in female    Negative for 70 gene panel+RNA. Results in media tab    Depression    Fibromyalgia    Headache    Lupus    Neuroendocrine tumor (HCC)    Obesity    PONV (postoperative nausea and vomiting)    Seizure disorder (HCC)    UTI (urinary tract infection)        Past Surgical History:   Past Surgical History:   Procedure Laterality Date    Colonoscopy         Family Medical History:   Family History   Problem Relation Age of Onset    Heart Attack Father     Anxiety Mother     Anemia Mother     Cancer Maternal Grandmother 50        breast; thyroid @ 60    Diabetes Maternal Grandmother     Heart Disease Maternal Grandfather     Cancer Paternal Grandmother 67        breast    Diabetes Paternal Grandmother     Cancer Paternal Grandfather 35        colon ca    Heart Attack Paternal Grandfather     Other (pituitary tumor) Paternal Grandfather     Other (Other) Paternal Aunt         pituitary tumor        Social History:   Social History     Socioeconomic History    Marital status: Single     Spouse name: Not on file    Number of children: Not on file    Years of education: Not on file    Highest education level: Not on file   Occupational  History    Not on file   Tobacco Use    Smoking status: Never     Passive exposure: Never    Smokeless tobacco: Never   Vaping Use    Vaping status: Never Used   Substance and Sexual Activity    Alcohol use: Never    Drug use: Never    Sexual activity: Not on file   Other Topics Concern    Not on file   Social History Narrative    Not on file     Social Drivers of Health     Financial Resource Strain: Medium Risk (8/6/2024)    Received from [facility]    Overall Financial Resource Strain (CARDIA)     Difficulty of Paying Living Expenses: Somewhat hard   Food Insecurity: No Food Insecurity (8/6/2024)    Received from [facility]    Hunger Vital Sign     Worried About Running Out of Food in the Last Year: Never true     Ran Out of Food in the Last Year: Never true   Transportation Needs: No Transportation Needs (8/6/2024)    Received from [facility]    PRAPARE - Transportation     Lack of Transportation (Medical): No     Lack of Transportation (Non-Medical): No   Recent Concern: Transportation Needs - Unmet Transportation Needs (7/8/2024)    Transportation Needs     Lack of Transportation: Yes     Car Seat: Not on file   Physical Activity: Insufficiently Active (8/6/2024)    Received from [facility]    Exercise Vital Sign     Days of Exercise per Week: 3 days     Minutes of Exercise per Session: 30 min   Stress: No Stress Concern Present (8/6/2024)    Received from [facility]    [employer] - Occupational Stress Questionnaire     Feeling of Stress : Not at all   Recent Concern: Stress - Stress Concern Present (7/8/2024)    Stress     Feeling of Stress : Yes   Social Connections: Low Risk  (7/4/2024)    Received from [facility]    Social Connections     How often do you see or talk to people that you care about and feel close to? (For example: talking to friends on the phone,  visiting friends or family, going to [employer] or club meetings): 5 or more times a week   Housing Stability: Unknown (8/6/2024)    Received from [facility]    Housing Stability Vital Sign     Unable to Pay for Housing in the Last Year: No     Number of Places Lived in the Last Year: Not on file     Unstable Housing in the Last Year: No   Recent Concern: Housing Stability - Medium Risk (7/8/2024)    Housing Stability     Housing Instability: No     Housing Instability Emergency: Not on file     Crib or Bassinette: No        Allergies: Amoxicillin, Amoxicillin-pot clavulanate, Penicillins, Sulfa antibiotics, Tomato, Wandy protect, Dimethicone-zinc oxide, and Zinc oxide     Medications:   Current Outpatient Medications   Medication Sig Dispense Refill    budesonide 0.5 MG/2ML Inhalation Suspension Take 2 mL (0.5 mg total) by nebulization daily. 120 mL 5    metFORMIN  MG Oral Tablet 24 Hr Take 1 tablet (500 mg total) by mouth daily. 90 tablet 0    fluticasone-salmeterol 250-50 MCG/ACT Inhalation Aerosol Powder, Breath Activated Inhale 1 puff into the lungs 2 (two) times daily. 1 each 0    albuterol 108 (90 Base) MCG/ACT Inhalation Aero Soln Inhale 2 puffs into the lungs every 4 (four) hours as needed for Wheezing or Shortness of Breath. Please dispense covered albuterol. 18 g 5    montelukast (SINGULAIR) 10 MG Oral Tab Take 1 tablet (10 mg total) by mouth daily. 90 tablet 3    levETIRAcetam 1000 MG Oral Tab Take 1 tablet (1,000 mg total) by mouth 2 (two) times daily. 180 tablet 3    Ferrous Sulfate 325 (65 Fe) MG Oral Tab Take 1 tablet (325 mg total) by mouth daily with breakfast. 30 tablet 1    Cholecalciferol 50 MCG (2000 UT) Oral Tab Take 1 tablet (2,000 Units total) by mouth As Directed.      Cyanocobalamin 2500 MCG Oral Tab Take by mouth As Directed.      folic acid 400 MCG Oral Tab Take 1 tablet (400 mcg total) by mouth As Directed.      Potassium Chloride ER 10 MEQ Oral Tab CR Take 1 tablet  (10 mEq total) by mouth daily. (Patient not taking: Reported on 9/13/2024) 30 tablet 0    Spacer/Aero-Holding Chambers Does not apply Device Use with hfa inhaler as directed (Patient not taking: Reported on 7/19/2024) 1 each 0       Review of Systems: Review of Systems    Physical Exam:  /74 (BP Location: Right arm, Patient Position: Sitting, Cuff Size: adult)   Pulse 88   Resp 16   Ht 5' 2\" (1.575 m)   Wt 198 lb (89.8 kg)   LMP  (LMP Unknown)   SpO2 99%   BMI 36.21 kg/m²      Constitutional: alert, cooperative. No acute distress.  HEENT: Head NC/AT. Nares normal. Septum midline. Mucosa normal. No drainage or sinus tenderness.. Mallampati 2+  Cardio: Regular rate and rhythm. Normal S1 and S2. No murmurs.   Respiratory: Thorax symmetrical with no labored breathing. clear to auscultation bilaterally  GI: NABS. Abd soft, non-tender.  Extremities: No clubbing or cyanosis. No BLE edema.    Neurologic: A&Ox3. No gross motor deficits.  Skin: Warm, dry  Psych: Calm, cooperative. Pleasant affect.    Results:  Personally reviewed  WBC: 3.5, done on 7/8/2024.  HGB: 10, done on 7/8/2024.  PLT: 238, done on 7/8/2024.     Glucose: 65, done on 7/19/2024.  Cr: 0.63, done on 7/19/2024.  Last eGFR was 128 on 7/19/2024.  CA: 9.3, done on 7/19/2024.  Na: 143, done on 7/19/2024.  K: 3.6, done on 7/19/2024.  Cl: 109, done on 7/19/2024.  CO2: 26, done on 7/19/2024.  Last ALB was 4.4% done on 2/16/2024.     No results found.     Assessment/Plan:  #1. Possible moderate persistent asthma  Would continue advair for now  Start budesonide neb  Check asthma allergen screen  Check PFTs  Its possible that remnant neuroendocrine tumor could be causing wheezing      Return in about 6 weeks (around 12/5/2024).    Juan Santoyo MD  10/24/2024   Yes

## 2024-10-25 ENCOUNTER — TELEPHONE (OUTPATIENT)
Dept: ENDOCRINOLOGY CLINIC | Facility: CLINIC | Age: 24
End: 2024-10-25

## 2024-10-25 DIAGNOSIS — D35.2 PITUITARY ADENOMA (HCC): Primary | ICD-10-CM

## 2024-10-25 LAB
A ALTERNATA IGE QN: <0.1 KUA/L (ref ?–0.1)
A FUMIGATUS IGE QN: <0.1 KUA/L (ref ?–0.1)
ACTH: 19 PG/ML
ALLERGEN BRAZIL NUT: <0.1 KUA/L (ref ?–0.1)
ALMOND IGE QN: <0.1 KUA/L (ref ?–0.1)
AMER SYCAMORE IGE QN: <0.1 KUA/L (ref ?–0.1)
BERMUDA GRASS IGE QN: <0.1 KUA/L (ref ?–0.1)
BOXELDER IGE QN: <0.1 KUA/L (ref ?–0.1)
C HERBARUM IGE QN: <0.1 KUA/L (ref ?–0.1)
CALIF WALNUT IGE QN: <0.1 KUA/L (ref ?–0.1)
CASHEW NUT IGE QN: <0.1 KUA/L (ref ?–0.1)
CAT DANDER IGE QN: <0.1 KUA/L (ref ?–0.1)
CLAM IGE QN: <0.1 KUA/L (ref ?–0.1)
CMN PIGWEED IGE QN: <0.1 KUA/L (ref ?–0.1)
CODFISH IGE QN: <0.1 KUA/L (ref ?–0.1)
COMMON RAGWEED IGE QN: <0.1 KUA/L (ref ?–0.1)
CORN IGE QN: <0.1 KUA/L (ref ?–0.1)
COTTONWOOD IGE QN: <0.1 KUA/L (ref ?–0.1)
COW MILK IGE QN: <0.1 KUA/L (ref ?–0.1)
D FARINAE IGE QN: <0.1 KUA/L (ref ?–0.1)
D PTERONYSS IGE QN: <0.1 KUA/L (ref ?–0.1)
DOG DANDER IGE QN: <0.1 KUA/L (ref ?–0.1)
EGG WHITE IGE QN: <0.1 KUA/L (ref ?–0.1)
GLUTEN IGE QN: <0.1 KUA/L (ref ?–0.1)
HAZELNUT IGE QN: <0.1 KUA/L (ref ?–0.1)
IGE SERPL-ACNC: 8.68 KU/L (ref 2–214)
IGE SERPL-ACNC: 9.3 KU/L (ref 2–214)
LC CALCIUM, IONIZED: 5 MG/DL
M RACEMOSUS IGE QN: <0.1 KUA/L (ref ?–0.1)
MARSH ELDER IGE QN: <0.1 KUA/L (ref ?–0.1)
MOUSE EPITH IGE QN: <0.1 KUA/L (ref ?–0.1)
MT JUNIPER IGE QN: <0.1 KUA/L (ref ?–0.1)
P NOTATUM IGE QN: <0.1 KUA/L (ref ?–0.1)
PEANUT IGE QN: <0.1 KUA/L (ref ?–0.1)
PECAN/HICK TREE IGE QN: <0.1 KUA/L (ref ?–0.1)
ROACH IGE QN: <0.1 KUA/L (ref ?–0.1)
SALMON IGE QN: <0.1 KUA/L (ref ?–0.1)
SALTWORT IGE QN: <0.1 KUA/L (ref ?–0.1)
SCALLOP IGE QN: <0.1 KUA/L (ref ?–0.1)
SESAME SEED IGE QN: <0.1 KUA/L (ref ?–0.1)
SHRIMP IGE QN: <0.1 KUA/L (ref ?–0.1)
SILVER BIRCH IGE QN: <0.1 KUA/L (ref ?–0.1)
SOYBEAN IGE QN: <0.1 KUA/L (ref ?–0.1)
TIMOTHY IGE QN: <0.1 KUA/L (ref ?–0.1)
WALNUT IGE QN: <0.1 KUA/L (ref ?–0.1)
WHEAT IGE QN: <0.1 KUA/L (ref ?–0.1)
WHITE ASH IGE QN: <0.1 KUA/L (ref ?–0.1)
WHITE ELM IGE QN: <0.1 KUA/L (ref ?–0.1)
WHITE MULBERRY IGE QN: <0.1 KUA/L (ref ?–0.1)
WHITE OAK IGE QN: <0.1 KUA/L (ref ?–0.1)

## 2024-10-25 NOTE — TELEPHONE ENCOUNTER
Message will be forwarded to provider and will let pt know of her decision.     See message below, and please advise thanks

## 2024-10-26 LAB
IGF I: 325 NG/ML
IGF-1, Z SCORE: 1.6 S.D.

## 2024-10-29 NOTE — TELEPHONE ENCOUNTER
Hi!    Please ask patient if she was fasting for the blood tests?     Pituitary evaluation is within normal limits, except that she must repeat the AM cortisol since she did not complete these before 9AM. Thank you!

## 2024-10-30 ENCOUNTER — TELEMEDICINE (OUTPATIENT)
Dept: FAMILY MEDICINE CLINIC | Facility: CLINIC | Age: 24
End: 2024-10-30
Payer: COMMERCIAL

## 2024-10-30 DIAGNOSIS — Z53.21 PATIENT LEFT WITHOUT BEING SEEN: Primary | ICD-10-CM

## 2024-10-30 NOTE — TELEPHONE ENCOUNTER
Called pt and provided the below update. Pt states she was fasting for cortisol but completed them before doing an MRI around 11 am. Pt agreeable to redoing cortisol and will repeat fasting and before 9 am. Pt scheduled for follow up 11/5.

## 2024-11-01 ENCOUNTER — LAB ENCOUNTER (OUTPATIENT)
Dept: LAB | Facility: HOSPITAL | Age: 24
End: 2024-11-01
Attending: INTERNAL MEDICINE

## 2024-11-01 DIAGNOSIS — D35.2 PITUITARY ADENOMA (HCC): ICD-10-CM

## 2024-11-01 LAB — CORTIS SERPL-MCNC: 20 UG/DL

## 2024-11-01 PROCEDURE — 36415 COLL VENOUS BLD VENIPUNCTURE: CPT

## 2024-11-01 PROCEDURE — 82533 TOTAL CORTISOL: CPT

## 2024-11-04 ENCOUNTER — APPOINTMENT (OUTPATIENT)
Dept: GENERAL RADIOLOGY | Facility: HOSPITAL | Age: 24
End: 2024-11-04
Attending: EMERGENCY MEDICINE

## 2024-11-04 ENCOUNTER — HOSPITAL ENCOUNTER (EMERGENCY)
Facility: HOSPITAL | Age: 24
Discharge: HOME | End: 2024-11-04
Attending: EMERGENCY MEDICINE

## 2024-11-04 VITALS
RESPIRATION RATE: 20 BRPM | BODY MASS INDEX: 34.96 KG/M2 | SYSTOLIC BLOOD PRESSURE: 109 MMHG | WEIGHT: 190 LBS | OXYGEN SATURATION: 98 % | TEMPERATURE: 97 F | HEART RATE: 70 BPM | DIASTOLIC BLOOD PRESSURE: 68 MMHG | HEIGHT: 62 IN

## 2024-11-04 DIAGNOSIS — R06.02 SOB (SHORTNESS OF BREATH): Primary | ICD-10-CM

## 2024-11-04 PROCEDURE — 99283 EMERGENCY DEPT VISIT LOW MDM: CPT

## 2024-11-04 PROCEDURE — 93010 ELECTROCARDIOGRAM REPORT: CPT

## 2024-11-04 PROCEDURE — 93005 ELECTROCARDIOGRAM TRACING: CPT

## 2024-11-04 PROCEDURE — 99284 EMERGENCY DEPT VISIT MOD MDM: CPT

## 2024-11-04 PROCEDURE — 71045 X-RAY EXAM CHEST 1 VIEW: CPT | Performed by: EMERGENCY MEDICINE

## 2024-11-05 ENCOUNTER — OFFICE VISIT (OUTPATIENT)
Dept: ENDOCRINOLOGY CLINIC | Facility: CLINIC | Age: 24
End: 2024-11-05

## 2024-11-05 VITALS
BODY MASS INDEX: 36.8 KG/M2 | HEIGHT: 62 IN | HEART RATE: 74 BPM | SYSTOLIC BLOOD PRESSURE: 98 MMHG | DIASTOLIC BLOOD PRESSURE: 63 MMHG | WEIGHT: 200 LBS

## 2024-11-05 DIAGNOSIS — D35.2 PITUITARY ADENOMA (HCC): Primary | ICD-10-CM

## 2024-11-05 DIAGNOSIS — D3A.8 NEUROENDOCRINE TUMOR (HCC): ICD-10-CM

## 2024-11-05 LAB
ATRIAL RATE: 77 BPM
P AXIS: 33 DEGREES
P-R INTERVAL: 148 MS
Q-T INTERVAL: 358 MS
QRS DURATION: 74 MS
QTC CALCULATION (BEZET): 405 MS
R AXIS: 22 DEGREES
T AXIS: 19 DEGREES
VENTRICULAR RATE: 77 BPM

## 2024-11-05 NOTE — ED INITIAL ASSESSMENT (HPI)
To ED with c/o difficulty breathing that worsen today. Patient states she has a neuroendocrine tumor on her lung and a partial collapse lung. Patient states taking her inhaler and had no relief.

## 2024-11-05 NOTE — PROGRESS NOTES
Follow-up - Reason for Visit:  Pituitary Adenoma.  Requesting Physician: Self-Referral.    CHIEF COMPLAINT:    Chief Complaint   Patient presents with    Follow - Up     Mri results, labs         HISTORY OF PRESENT ILLNESS:   Kaylie Vance is a 24 year old female who presents with the possibility of a pituitary adenoma that was seen on CT scan of the head, during work-up for seizures at an outside institution.     She has also been diagnosed with a neuroendocrine/ carcinoid tumor. This was removed by surgery recently. It was a grade 3 NET located at the colon. It had stained for synaptophysin, chromogranin. Confirmed by pathology.     Her paternal grandfather's sister also had a pituitary adenoma, and her paternal aunt also has a pituitary adenoma. Her paternal grandfather had colon cancer and he passed away from this.     At the last visit, we decided that we would perform a pituitary evaluation, checking for hormonal testing, as well as pituitary MRI.    She is here to go over the test results.     PAST MEDICAL HISTORY:   Past Medical History:    Allergic rhinitis    Anemia    Anxiety    BRCA gene mutation negative in female    Negative for 70 gene panel+RNA. Results in media tab    Depression    Fibromyalgia    Headache    Lupus    Neuroendocrine tumor (HCC)    Obesity    PONV (postoperative nausea and vomiting)    Seizure disorder (HCC)    UTI (urinary tract infection)       PAST SURGICAL HISTORY:   Past Surgical History:   Procedure Laterality Date    Colonoscopy         SOCIAL HISTORY:    Social History     Socioeconomic History    Marital status: Single   Tobacco Use    Smoking status: Never     Passive exposure: Never    Smokeless tobacco: Never   Vaping Use    Vaping status: Never Used   Substance and Sexual Activity    Alcohol use: Never    Drug use: Never     Social Drivers of Health     Financial Resource Strain: Medium Risk (8/6/2024)    Received from [facility]    Overall  Financial Resource Strain (CARDIA)     Difficulty of Paying Living Expenses: Somewhat hard   Food Insecurity: No Food Insecurity (8/6/2024)    Received from [facility]    Hunger Vital Sign     Worried About Running Out of Food in the Last Year: Never true     Ran Out of Food in the Last Year: Never true   Transportation Needs: No Transportation Needs (8/6/2024)    Received from [facility]    PRAPARE - Transportation     Lack of Transportation (Medical): No     Lack of Transportation (Non-Medical): No   Recent Concern: Transportation Needs - Unmet Transportation Needs (7/8/2024)    Transportation Needs     Lack of Transportation: Yes   Physical Activity: Insufficiently Active (8/6/2024)    Received from [facility]    Exercise Vital Sign     Days of Exercise per Week: 3 days     Minutes of Exercise per Session: 30 min   Stress: No Stress Concern Present (8/6/2024)    Received from [facility]    [employer] [place] [employer] - Occupational Stress Questionnaire     Feeling of Stress : Not at all   Recent Concern: Stress - Stress Concern Present (7/8/2024)    Stress     Feeling of Stress : Yes   Social Connections: Low Risk  (7/4/2024)    Received from [facility]    Social Connections     How often do you see or talk to people that you care about and feel close to? (For example: talking to friends on the phone, visiting friends or family, going to [employer] or club meetings): 5 or more times a week   Housing Stability: Unknown (8/6/2024)    Received from [facility]    Housing Stability Vital Sign     Unable to Pay for Housing in the Last Year: No     Unstable Housing in the Last Year: No   Recent Concern: Housing Stability - Medium Risk (7/8/2024)    Housing Stability     Housing Instability: No     Crib or [place]in[place]: No       FAMILY HISTORY:   Family History   Problem Relation Age of Onset    Heart  Attack Father     Anxiety Mother     Anemia Mother     Cancer Maternal Grandmother 50        breast; thyroid @ 60    Diabetes Maternal Grandmother     Heart Disease Maternal Grandfather     Cancer Paternal Grandmother 67        breast    Diabetes Paternal Grandmother     Cancer Paternal Grandfather 35        colon ca    Heart Attack Paternal Grandfather     Other (pituitary tumor) Paternal Grandfather     Other (Other) Paternal Aunt         pituitary tumor       CURRENT MEDICATIONS:    Current Outpatient Medications   Medication Sig Dispense Refill    budesonide 0.5 MG/2ML Inhalation Suspension Take 2 mL (0.5 mg total) by nebulization daily. 120 mL 5    metFORMIN  MG Oral Tablet 24 Hr Take 1 tablet (500 mg total) by mouth daily. 90 tablet 0    fluticasone-salmeterol 250-50 MCG/ACT Inhalation Aerosol Powder, Breath Activated Inhale 1 puff into the lungs 2 (two) times daily. 1 each 0    albuterol 108 (90 Base) MCG/ACT Inhalation Aero Soln Inhale 2 puffs into the lungs every 4 (four) hours as needed for Wheezing or Shortness of Breath. Please dispense covered albuterol. 18 g 5    montelukast (SINGULAIR) 10 MG Oral Tab Take 1 tablet (10 mg total) by mouth daily. 90 tablet 3    levETIRAcetam 1000 MG Oral Tab Take 1 tablet (1,000 mg total) by mouth 2 (two) times daily. 180 tablet 3    Potassium Chloride ER 10 MEQ Oral Tab CR Take 1 tablet (10 mEq total) by mouth daily. (Patient not taking: Reported on 9/13/2024) 30 tablet 0    Spacer/Aero-Holding Chambers Does not apply Device Use with hfa inhaler as directed (Patient not taking: Reported on 7/19/2024) 1 each 0    Ferrous Sulfate 325 (65 Fe) MG Oral Tab Take 1 tablet (325 mg total) by mouth daily with breakfast. 30 tablet 1    Cholecalciferol 50 MCG (2000 UT) Oral Tab Take 1 tablet (2,000 Units total) by mouth As Directed.      Cyanocobalamin 2500 MCG Oral Tab Take by mouth As Directed.      folic acid 400 MCG Oral Tab Take 1 tablet (400 mcg total) by mouth As  Directed.         ALLERGIES:  Allergies   Allergen Reactions    Amoxicillin HIVES    Amoxicillin-Pot Clavulanate HIVES, DIARRHEA, RASH and NAUSEA AND VOMITING     Augmentin    Penicillins DIARRHEA, HIVES, NAUSEA ONLY, RASH, FEVER and NAUSEA AND VOMITING     Other reaction(s): GI Symptoms, Hives   Other reaction(s): GI Symptoms   Other reaction(s): Hives    Sulfa Antibiotics DIARRHEA, HIVES, RASH, UNKNOWN, FEVER and NAUSEA AND VOMITING     Other reaction(s): Unknown    Tomato HIVES, NAUSEA ONLY and NAUSEA AND VOMITING    Wandy Protect RASH    Dimethicone-Zinc Oxide RASH    Zinc Oxide DIARRHEA and RASH         ASSESSMENTS:     REVIEW OF SYSTEMS:  Constitutional: Negative for: weight change, fever, fatigue, cold/heat intolerance  Eyes: Negative for:  Visual changes, proptosis, blurring  ENT: Negative for:  dysphagia, neck swelling, dysphonia  Respiratory: Negative for:  dyspnea, cough  Cardiovascular: Negative for:  chest pain, palpitations, orthopnea  GI: Negative for:  abdominal pain, nausea, vomiting, diarrhea, constipation, bleeding  Neurology: Negative for: headache, numbness, weakness  Genito-Urinary: Negative for: dysuria, frequency  Psychiatric: Negative for:  depression, anxiety  Hematology/Lymphatics: Negative for: bruising, lower extremity edema  Endocrine: Negative for: polyuria, polydypsia  Skin: Negative for: rash, blister, cellulitis,      PHYSICAL EXAM:   Vitals:    11/05/24 1057   BP: 98/63   Pulse: 74   Weight: 200 lb (90.7 kg)   Height: 5' 2\" (1.575 m)     BMI: Body mass index is 36.58 kg/m².     CONSTITUTIONAL:  awake, alert, cooperative, no apparent distress, and appears stated age  PSYCH: normal affect  EYES:  No proptosis, no ptosis, conjunctiva normal  SKIN:  no bruising or bleeding, no rashes and no lesions  EXTREMITIES: no edema      DATA:   Reviewed    ASSESSMENT AND PLAN: This is a 24 year-old woman here for follow-up of management of a pituitary adenoma. We have performed a complete hormonal  evaluation and it has come back negative. She has a pituitary macroadenoma.     She will come back in 1 year to follow-up on pituitary MRI as well as hormonal evaluation.    Prior to this encounter, I spent over 15 minutes with preparing for the visit, including reviewing documents from other specialties as well as from PCP and going over test results and imaging studies. During the face to face encounter, I spent an additional 15 minutes which were determined for follow-up. Greater than 50% of the time was spent in counseling, anticipatory guidance, and coordination of care. Patient concerns were answered to the best of my knowledge.       11/05/24  Grecia Payne MD

## 2024-11-05 NOTE — ED PROVIDER NOTES
Patient Seen in: [facility] Emergency Department    History     Chief Complaint   Patient presents with    Difficulty Breathing       HPI    The patient presents to the ED complaining of difficulty breathing.  She states that she was at class today when she began to get short of breath and states that she was hyperventilating.  She states that she now feels better but wanted to get checked out.  History of a neuroendocrine tumor on her lung and states that her lung is \"partially collapsed\".  Denies any chest pain or pain with breathing.    History reviewed.   Past Medical History:    Allergic rhinitis    Anemia    Anxiety    BRCA gene mutation negative in female    Negative for 70 gene panel+RNA. Results in media tab    Depression    Fibromyalgia    Headache    Lupus    Neuroendocrine tumor (HCC)    Obesity    PONV (postoperative nausea and vomiting)    Seizure disorder (HCC)    UTI (urinary tract infection)       History reviewed.   Past Surgical History:   Procedure Laterality Date    Colonoscopy           Medications :  Prescriptions Prior to Admission[1]     Family History   Problem Relation Age of Onset    Heart Attack Father     Anxiety Mother     Anemia Mother     Cancer Maternal Grandmother 50        breast; thyroid @ 60    Diabetes Maternal Grandmother     Heart Disease Maternal Grandfather     Cancer Paternal Grandmother 67        breast    Diabetes Paternal Grandmother     Cancer Paternal Grandfather 35        colon ca    Heart Attack Paternal Grandfather     Other (pituitary tumor) Paternal Grandfather     Other (Other) Paternal Aunt         pituitary tumor       Smoking Status:   Social History     Socioeconomic History    Marital status: Single   Tobacco Use    Smoking status: Never     Passive exposure: Never    Smokeless tobacco: Never   Vaping Use    Vaping status: Never Used   Substance and Sexual Activity    Alcohol use: Never    Drug use: Never       Constitutional and vital signs  reviewed.      Social History and Family History elements reviewed from today, pertinent positives to the presenting problem noted.    Physical Exam     ED Triage Vitals   BP 11/04/24 2003 94/63   Pulse 11/04/24 2001 84   Resp 11/04/24 2001 20   Temp 11/04/24 2001 97.4 °F (36.3 °C)   Temp src 11/04/24 2001 Temporal   SpO2 11/04/24 2001 99 %   O2 Device 11/04/24 2001 None (Room air)       All measures to prevent infection transmission during my interaction with the patient were taken. Handwashing was performed prior to and after the exam.  Stethoscope and any equipment used during my examination was cleaned with super sani-cloth germicidal wipes following the exam.     Physical Exam  Vitals and nursing note reviewed.   Constitutional:       General: She is not in acute distress.     Appearance: She is obese. She is not ill-appearing or toxic-appearing.   HENT:      Head: Normocephalic and atraumatic.   Eyes:      General:         Right eye: No discharge.         Left eye: No discharge.      Conjunctiva/sclera: Conjunctivae normal.   Neck:      Trachea: No tracheal deviation.   Cardiovascular:      Rate and Rhythm: Normal rate and regular rhythm.   Pulmonary:      Effort: Pulmonary effort is normal. No respiratory distress.      Breath sounds: Normal breath sounds. No stridor.   Abdominal:      General: There is no distension.      Palpations: Abdomen is soft.   Musculoskeletal:         General: No deformity.   Skin:     General: Skin is warm and dry.   Neurological:      Mental Status: She is alert and oriented to person, place, and time.   Psychiatric:         Mood and Affect: Mood normal.         Behavior: Behavior normal.         ED Course      Labs Reviewed - No data to display  EKG    Rate, intervals and axes as noted on EKG Report.  Rate: Normal, 77 bpm  Rhythm: Sinus Rhythm  Reading: Normal intervals, lateral Q waves, abnormal EKG           As Interpreted by me    Imaging Results Available and Reviewed while  in ED: Chest Radiograph  COMPARISON: Prior dated 5/6/2023    IMPRESSION: Underpenetrated technique likely accounts for subtle haziness in the lower lungs.  Allowing for this, no convincing evidence for pneumonia or edema.  No pleural effusion or pneumothorax.  Heart size and mediastinal contours appear normal.  Bony structures are intact.    ED Medications Administered: Medications - No data to display      MDM     Vitals:    11/04/24 2003 11/04/24 2101 11/04/24 2131 11/04/24 2201   BP: 94/63 103/62 98/52 109/68   Pulse:  78 82 70   Resp:       Temp:       TempSrc:       SpO2:  100% 98% 98%   Weight:       Height:         *I personally reviewed and interpreted all ED vitals.    Pulse Ox: 98%, Room air, Normal     Monitor Interpretation:   normal sinus rhythm as interpreted by me.  The cardiac monitor was ordered to monitor heart rate.    Differential Diagnosis/ Diagnostic Considerations: Anxiety attack, pneumothorax, pneumonia, other    Complicating Factors: The patient already has does not have any pertinent problems on file. to contribute to the complexity of this ED evaluation.    Medical Decision Making  The patient presents to the ED with now improved shortness of breath.  Possible anxiety symptoms.  Patient nondistressed on examination, O2 saturation normal and chest x-ray without concerning findings.  Patient reassured and stable for discharge with outpatient follow-up    Problems Addressed:  SOB (shortness of breath): acute illness or injury    Amount and/or Complexity of Data Reviewed  Radiology: ordered and independent interpretation performed. Decision-making details documented in ED Course.     Details: I personally reviewed the patient's chest x-ray image and noted no pneumothorax  ECG/medicine tests: ordered and independent interpretation performed. Decision-making details documented in ED Course.        Condition upon leaving the department: Stable    Disposition and Plan     Clinical Impression:  1.  SOB (shortness of breath)        Disposition:  Discharge    Follow-up:  Tabitha Hernandez, REJI  [address] [place] [id number]  [phone]    Schedule an appointment as soon as possible for a visit in 3 day(s)        Medications Prescribed:  Discharge Medication List as of 11/4/2024 11:31 PM                           [1] (Not in a hospital admission)

## 2024-11-06 ENCOUNTER — TELEPHONE (OUTPATIENT)
Dept: NEUROLOGY | Facility: CLINIC | Age: 24
End: 2024-11-06

## 2024-11-07 ENCOUNTER — TELEPHONE (OUTPATIENT)
Dept: SPEECH THERAPY | Facility: HOSPITAL | Age: 24
End: 2024-11-07

## 2024-11-21 ENCOUNTER — OFFICE VISIT (OUTPATIENT)
Dept: SURGERY | Facility: CLINIC | Age: 24
End: 2024-11-21
Payer: COMMERCIAL

## 2024-11-21 VITALS
SYSTOLIC BLOOD PRESSURE: 122 MMHG | HEIGHT: 63.8 IN | BODY MASS INDEX: 34.74 KG/M2 | OXYGEN SATURATION: 99 % | HEART RATE: 91 BPM | DIASTOLIC BLOOD PRESSURE: 80 MMHG | WEIGHT: 201 LBS

## 2024-11-21 DIAGNOSIS — R63.2 INCREASED APPETITE: ICD-10-CM

## 2024-11-21 DIAGNOSIS — G89.29 CHRONIC BACK PAIN, UNSPECIFIED BACK LOCATION, UNSPECIFIED BACK PAIN LATERALITY: ICD-10-CM

## 2024-11-21 DIAGNOSIS — Z51.81 ENCOUNTER FOR THERAPEUTIC DRUG MONITORING: Primary | ICD-10-CM

## 2024-11-21 DIAGNOSIS — M54.9 CHRONIC BACK PAIN, UNSPECIFIED BACK LOCATION, UNSPECIFIED BACK PAIN LATERALITY: ICD-10-CM

## 2024-11-21 DIAGNOSIS — E88.819 INSULIN RESISTANCE: ICD-10-CM

## 2024-11-21 DIAGNOSIS — E66.9 OBESITY (BMI 30-39.9): ICD-10-CM

## 2024-11-21 RX ORDER — SEMAGLUTIDE 0.25 MG/.5ML
0.25 INJECTION, SOLUTION SUBCUTANEOUS WEEKLY
Qty: 4 EACH | Refills: 1 | Status: SHIPPED | OUTPATIENT
Start: 2024-11-21 | End: 2024-11-21 | Stop reason: CLARIF

## 2024-11-21 RX ORDER — SEMAGLUTIDE 0.25 MG/.5ML
0.25 INJECTION, SOLUTION SUBCUTANEOUS WEEKLY
Qty: 4 EACH | Refills: 1 | Status: SHIPPED | OUTPATIENT
Start: 2024-11-21

## 2024-11-21 NOTE — PROGRESS NOTES
[facility], [facility], [place]  [address] [facility] [address] [place] [id number]  Dept: [phone]       Patient:  Kaylie Vance  :      11/3/2000  MRN:      [id number]    Chief Complaint:    Chief Complaint   Patient presents with    Follow - Up    Weight Management       SUBJECTIVE     History of Present Illness:  Kaylie is being seen today for a follow-up for weight management.     Dx pituitary adenoma since last visit. Will monitor with endocrinologist.     Initial HPI:  21 yo female.   Presents to clinic for assistance with weight loss/maintenance.   Took phentermine 2020-2020. Online clinic.  Lost 45 lbs. Regained weight.   Now pescetarian 3 months.   Hx recurrent miscarriages. In pelvic floor therapy.    On sertraline.     Patient is considering medications and is not a candidate for bariatric surgery for weight loss.  Patient denies any history of eating disorder(s).    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 Event planning business.   Patient lives with self.    Patient's goal weight:  Biggest weight loss in the past:  45 lbs; 10 lbs   How weight loss was achieved: 45 lbs phentermine, diet & exercise changes, 10 lbs diet & exercise changes   Heaviest weight ever:  Previous use of medical weight loss medications:    Physical activity: Boxing 3 days/week     Sleep: inadequate hours/night    Sleep screening:     Past Medical History:   Past Medical History:    Allergic rhinitis    Anemia    Anxiety    BRCA gene mutation negative in female    Negative for 70 gene panel+RNA. Results in media tab    Depression    Fibromyalgia    Headache    Lupus    Neuroendocrine tumor (HCC)    Obesity    PONV (postoperative nausea and vomiting)    Seizure disorder (HCC)    UTI (urinary tract infection)        Comorbidities:      OBJECTIVE     Vitals: /80   Pulse 91   Ht 5' 3.8\" (1.621 m)   Wt 201 lb (91.2 kg)    LMP 10/13/2024   SpO2 99%   BMI 34.72 kg/m²     Initial weight loss: +08   Total weight loss:  +14   Start weight: 186    Wt Readings from Last 6 Encounters:   11/21/24 201 lb (91.2 kg)   11/05/24 200 lb (90.7 kg)   11/04/24 190 lb (86.2 kg)   10/24/24 198 lb (89.8 kg)   10/15/24 198 lb (89.8 kg)   09/17/24 195 lb (88.5 kg)       Patient Medications:    Current Outpatient Medications   Medication Sig Dispense Refill    CUSTOM MEDICATION Semaglutide/ cyanocobalamin  0.25 mg-   Concentration: 1 /0.5 mg/ ML  Amount: 1 Ml vial  Instructions: Inject 25 units/ 0.25 ML subcutaneous weekly 1 each 3    semaglutide-weight management (WEGOVY) 0.25 MG/0.5ML Subcutaneous Solution Auto-injector Inject 0.5 mL (0.25 mg total) into the skin once a week. 4 each 1    budesonide 0.5 MG/2ML Inhalation Suspension Take 2 mL (0.5 mg total) by nebulization daily. 120 mL 5    metFORMIN  MG Oral Tablet 24 Hr Take 1 tablet (500 mg total) by mouth daily. 90 tablet 0    fluticasone-salmeterol 250-50 MCG/ACT Inhalation Aerosol Powder, Breath Activated Inhale 1 puff into the lungs 2 (two) times daily. 1 each 0    albuterol 108 (90 Base) MCG/ACT Inhalation Aero Soln Inhale 2 puffs into the lungs every 4 (four) hours as needed for Wheezing or Shortness of Breath. Please dispense covered albuterol. 18 g 5    montelukast (SINGULAIR) 10 MG Oral Tab Take 1 tablet (10 mg total) by mouth daily. 90 tablet 3    levETIRAcetam 1000 MG Oral Tab Take 1 tablet (1,000 mg total) by mouth 2 (two) times daily. 180 tablet 3    Potassium Chloride ER 10 MEQ Oral Tab CR Take 1 tablet (10 mEq total) by mouth daily. (Patient not taking: Reported on 9/13/2024) 30 tablet 0    Spacer/Aero-Holding Chambers Does not apply Device Use with hfa inhaler as directed (Patient not taking: Reported on 7/19/2024) 1 each 0    Ferrous Sulfate 325 (65 Fe) MG Oral Tab Take 1 tablet (325 mg total) by mouth daily with breakfast. 30 tablet 1    Cholecalciferol 50 MCG (2000 UT) Oral  Tab Take 1 tablet (2,000 Units total) by mouth As Directed.      Cyanocobalamin 2500 MCG Oral Tab Take by mouth As Directed.      folic acid 400 MCG Oral Tab Take 1 tablet (400 mcg total) by mouth As Directed.       Allergies:  Amoxicillin, Amoxicillin-pot clavulanate, Penicillins, Sulfa antibiotics, Tomato, Wandy protect, Dimethicone-zinc oxide, and Zinc oxide     Social History:  Reviewed    Surgical History:    Past Surgical History:   Procedure Laterality Date    Colonoscopy       Family History:    Family History   Problem Relation Age of Onset    Heart Attack Father     Anxiety Mother     Anemia Mother     Cancer Maternal Grandmother 50        breast; thyroid @ 60    Diabetes Maternal Grandmother     Heart Disease Maternal Grandfather     Cancer Paternal Grandmother 67        breast    Diabetes Paternal Grandmother     Cancer Paternal Grandfather 35        colon ca    Heart Attack Paternal Grandfather     Other (pituitary tumor) Paternal Grandfather     Other (Other) Paternal Aunt         pituitary tumor     Initial Intake:  After dinner behavior: + ice cream daily   Night eating: -  Portion sizes: -  Binge: -  Emotional: stress   Depression: + Score: 5 on sertraline   Grazing: -  Sweet tooth: +  Crunchy/salty: +  Etoh: Never   Soda Drinker: Yes                 If yes, how much?:  sprite occasional   Sports Drinks:  Yes               If yes, how much?:  Gatorade   Juice:  Yes                 If yes, how much?:  strawberry lemonade   Number of restaurant or fast food meals/week:  Rare meals/week, too expensive      Food Journal  Reviewed and Discussed:       Patient has a Food Journal?: no   Patient is reading nutrition labels?  yes  Average Caloric Intake:     Average CHO Intake:   Is patient exercising? yes  Type of exercise?      Eating Habits  Patient states the following:  Eats 3 meal(s) per day  Length of time it takes to consume a meal:    # of snacks per day:  none  Type of snacks:    Amount of soda  consumption per day:  None   Amount of water (in ounces) per day:  Improving   Toughest challenge:  staying consistent, health issues-siezures    Nutritional Goals  Eat 3-4 cups of fresh fruits or vegetables daily    Behavior Modifications Reviewed and Discussed  Eat breakfast, Eat 3 meals per day, Plan meals in advance, Read nutrition labels, Drink 64 oz of water per day, Maintain a daily food journal, Utlize portion control strategies to reduce calorie intake, Identify triggers for eating and manage cues, and Eat slowly and take 20 to 30 minutes to complete each meal    Exercise Goals Reviewed and Discussed    Aim for 150 minutes moderate level exercise weekly with 2-3 days strength training as tolerated     ROS:    Constitutional: negative  Respiratory: negative  Cardiovascular: negative  Gastrointestinal: positive for constipation  Integument/breast: negative  Hematologic/lymphatic: negative  Musculoskeletal:positive for back pain scoliosis   Neurological: negative  Behavioral/Psych: positive for depression  Endocrine: negative  All other systems were reviewed and are negative    Physical Exam:  General appearance: alert, appears stated age, cooperative and obese  Head: Normocephalic, without obvious abnormality, atraumatic  Neck: no adenopathy, no carotid bruit, no JVD, supple, symmetrical, trachea midline and thyroid not enlarged, symmetric, no tenderness/mass/nodules  Lungs: clear to auscultation bilaterally  Heart: S1, S2 normal, no murmur, click, rub or gallop, regular rate and rhythm  Abdomen: soft, non-tender; bowel sounds normal; no masses,  no organomegaly and abdomen obese   Extremities: intact, no edema   Pulses: 2+ and symmetric  Skin: intact   Neurologic: Grossly normal    ASSESSMENT     Encounter Diagnosis(ses):   Encounter Diagnoses   Name Primary?    Encounter for therapeutic drug monitoring Yes    Insulin resistance     Obesity (BMI 30-39.9)     Chronic back pain, unspecified back location,  unspecified back pain laterality     Increased appetite        PLAN     Diagnoses and all orders for this visit:    Encounter for therapeutic drug monitoring    Insulin resistance    Obesity (BMI 30-39.9)  -     Discontinue: semaglutide-weight management (WEGOVY) 0.25 MG/0.5ML Subcutaneous Solution Auto-injector; Inject 0.5 mL (0.25 mg total) into the skin once a week.  -     CUSTOM MEDICATION; Semaglutide/ cyanocobalamin  0.25 mg-   Concentration: 1 /0.5 mg/ ML  Amount: 1 Ml vial  Instructions: Inject 25 units/ 0.25 ML subcutaneous weekly  -     semaglutide-weight management (WEGOVY) 0.25 MG/0.5ML Subcutaneous Solution Auto-injector; Inject 0.5 mL (0.25 mg total) into the skin once a week.    Chronic back pain, unspecified back location, unspecified back pain laterality    Increased appetite        OBESITY/WEIGHT GAIN:     Recommended patient continue intensive lifestyle and behavioral modifications at this time for weight loss.     Reviewed lifestyle modifications: Whole Food/Plant Strong/Low Glycemic Index diet, moderate alcohol consumption, reduced sodium intake to no more than 2,400 mg/day, and at least 150 minutes of moderate physical activity per week.    Avoid processed, poor quality carbohydrates, refined grains, flour, sugar.     Goals for next month:  1. Keep a food log.   2. Drink 64 ounces of non-caloric beverages per day. No fruit juices or regular soda.  3. Aim for 150 minutes moderate exercise per week.    4. Increase fruit and vegetable servings to 5-6 per day.    5. Improve sleep and stress.      Reviewed labs:   9/12/22- CBC in range. Elevated . CMP otherwise in range.   3/29/23- Thyroid studies in range. Low hemoglobin.  10/10/23- . Hemoglobin low. CMP otherwise ok.   11/6/2023- FBS elevated. BMP otherwise in range. Low hemoglobin- supplement.   12/1/23- Vitamin D low 10.3- continue weekly supplement.   7/8/24- Low Hgb otherwise CBC in range.   4/5/24- Insulin elevated.  a1c 4.8.    Thyroid studies in range.     Denies hx cardiac disease or substance abuse.  Denies hx renal stones.     Denies personal or family hx medullary thyroid CA, endocrine neoplasia syndrome, pancreatitis hx, suicidal ideation. No renal impairment, severe GI disease, diabetes, pancreatitis risks noted.    Newly diagnosed seizures- avoid Contrave/Wellbutrin.   Dx macroadenoma of pituitary gland.   Will monitor with endo.    Has taken phentermine in the past.  Weight gain on phentermine 15 mg by mouth in the AM.  Avoid higher doses-severe constipation.     Has taken metformin in the past.      No Zepbound coverage.  Will check Wegovy coverage.  Interested in compounded option.     Start compounded semaglutide weekly (0.25 mg weekly).   Increase dose monthly as needed/tolerated.    Patient states understanding that compound semaglutide is a custom-made medication that mimics the GLP-1 hormone. It is used to treat type 2 diabetes/obesity and lower the risk of heart or blood vessel disease. It works by increasing insulin release, lowering glucagon release, delaying gastric emptying and reducing appetite. Compound semaglutide is prescribed when an FDA-approved medication, dose, or dosage form is unavailable (ie. Nationwide shortage or no obesity coverage for GLP-1 meds). Cost of these medications is  dollars monthly based on dose.     SQ administration teaching provided to patient.   Discussed risks, benefits, and side effects of medication. Avoid pregnancy during use. Contraindications for medication discussed at length. Patient states understanding.     Alternative: topiramate- will need neurology approval.     EKG done 07/2024.     s/p colonoscopy noting neuroendocrine tumor.  S/p Sigmoidoscopic endoscopic mucosal resection; rectosigmoid scar in 8/2024 noting a focus of residual well-differentiated neuroendocrine tumor, low grade- the resection margins (deep and lateral/peripheral) negative for neuroendocrine tumor  with Dr. Reyes Huitron  RECS:  - Colon surveillance in 3-5 years which can be done with [facility]      Completed pelvic floor therapy- recurrent miscarriage.     Meet with RD.     Goal: 150 lbs.      RTC 6 weeks virtual, 3-4 months in person.      REJI Chandler

## 2024-11-22 ENCOUNTER — TELEPHONE (OUTPATIENT)
Dept: SURGERY | Facility: CLINIC | Age: 24
End: 2024-11-22

## 2024-11-22 NOTE — PATIENT INSTRUCTIONS
Compound semaglutide is a custom-made medication that mimics the GLP-1 hormone. It is used to treat type 2 diabetes and lower the risk of heart or blood vessel disease. It works by increasing insulin release, lowering glucagon release, delaying gastric emptying and reducing appetite. Compound semaglutide is prescribed when an FDA-approved medication, dose, or dosage form is unavailable (ie. Nationwide shortage or no obesity coverage for GLP-1 meds). Patients are aware of the difference between these medications.  Cost of these medications is  dollars monthly based on dose.     [facility]  [address]  [place] [address]     Phone: [phone]     Fax: [phone]     Hours  Pharmacy: Mon - Fri 7:30AM - 7:30PM     Call Center: Mon - Fri 7:00AM - 7:00PM

## 2024-11-26 PROBLEM — D35.2 PITUITARY MACROADENOMA (HCC): Status: ACTIVE | Noted: 2024-09-11

## 2024-11-26 PROBLEM — I99.8 FLUCTUATING BLOOD PRESSURE: Status: ACTIVE | Noted: 2024-11-26

## 2024-11-26 PROBLEM — T21.02XA BURN OF ABDOMEN WALL: Status: RESOLVED | Noted: 2024-01-02 | Resolved: 2024-11-26

## 2024-11-27 ENCOUNTER — TELEPHONE (OUTPATIENT)
Dept: FAMILY MEDICINE CLINIC | Facility: CLINIC | Age: 24
End: 2024-11-27

## 2024-11-27 NOTE — TELEPHONE ENCOUNTER
Nebulizer order submitted to [employer] via [employer]. Patient notified via [employer].     Adult Nebulizer Reusable Package  Nebulizer Compressor for Reusable Package with Mouthpiece Only -   Nebulizer Set, Reusable -   Mouthpiece Only, N/A  Quantity  1  Supplier  [employer]

## 2024-12-01 ENCOUNTER — HOSPITAL ENCOUNTER (OUTPATIENT)
Age: 24
Discharge: HOME | End: 2024-12-01
Payer: COMMERCIAL

## 2024-12-01 VITALS
RESPIRATION RATE: 20 BRPM | OXYGEN SATURATION: 100 % | SYSTOLIC BLOOD PRESSURE: 116 MMHG | DIASTOLIC BLOOD PRESSURE: 61 MMHG | HEART RATE: 75 BPM | TEMPERATURE: 99 F

## 2024-12-01 DIAGNOSIS — K08.89 PAIN, DENTAL: Primary | ICD-10-CM

## 2024-12-01 DIAGNOSIS — K03.81 CRACKED TOOTH: ICD-10-CM

## 2024-12-01 RX ORDER — CLINDAMYCIN HYDROCHLORIDE 300 MG/1
300 CAPSULE ORAL 3 TIMES DAILY
Qty: 30 CAPSULE | Refills: 0 | Status: SHIPPED | OUTPATIENT
Start: 2024-12-01 | End: 2024-12-11

## 2024-12-01 RX ORDER — SACCHAROMYCES BOULARDII 250 MG
250 CAPSULE ORAL 2 TIMES DAILY
Qty: 28 CAPSULE | Refills: 0 | Status: SHIPPED | OUTPATIENT
Start: 2024-12-01 | End: 2024-12-15

## 2024-12-01 NOTE — DISCHARGE INSTRUCTIONS
Make sure to stay well hydrated with clear fluids. You can use Tylenol or Motrin every 6 hours to control fever or discomfort. You can use both Tylenol and Motrin, but alternate them so that you're getting one every 3 hours. Warm salt water gargles, throat lozenges, and warmed beverages can be additionally helpful for a sore throat. Follow up with your dentist or dental clinic provided in the next 2-3 days. Seek additional care in the ER for fever that is not controlled with Tylenol and Motrin, difficulty breathing or shortness of breath, difficulty swallowing, or any new/worsening symptoms.

## 2024-12-01 NOTE — ED INITIAL ASSESSMENT (HPI)
Patient is here with tooth pain from her right upper tooth.  She states the tooth is cracked and it started to bother her Friday evening.
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
## 2024-12-05 NOTE — TELEPHONE ENCOUNTER
Per [facility]: Good morning; because we have reached out multiple times to schedule with no success, we are cancelling this order in our system. Thank you.   Called patient and mother, per MEJIA and no answer. Unable to leave voicemail.  Dunamu message sent.

## 2024-12-10 ENCOUNTER — TELEPHONE (OUTPATIENT)
Dept: NEUROLOGY | Facility: CLINIC | Age: 24
End: 2024-12-10

## 2024-12-17 RX ORDER — LEVETIRACETAM 1000 MG/1
500 TABLET ORAL 2 TIMES DAILY
Qty: 90 TABLET | Refills: 0 | Status: SHIPPED | OUTPATIENT
Start: 2024-12-17
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
## 2024-12-18 ENCOUNTER — TELEMEDICINE (OUTPATIENT)
Dept: FAMILY MEDICINE CLINIC | Facility: CLINIC | Age: 24
End: 2024-12-18
Payer: COMMERCIAL

## 2024-12-18 DIAGNOSIS — E87.6 HYPOKALEMIA: ICD-10-CM

## 2024-12-18 DIAGNOSIS — N92.6 IRREGULAR MENSTRUAL CYCLE: ICD-10-CM

## 2024-12-18 DIAGNOSIS — D64.9 NORMOCYTIC ANEMIA: ICD-10-CM

## 2024-12-18 DIAGNOSIS — I99.8 FLUCTUATING BLOOD PRESSURE: ICD-10-CM

## 2024-12-18 DIAGNOSIS — E88.819 INSULIN RESISTANCE: ICD-10-CM

## 2024-12-18 DIAGNOSIS — E16.1 HYPERINSULINEMIA: ICD-10-CM

## 2024-12-18 DIAGNOSIS — E66.9 OBESITY (BMI 30-39.9): ICD-10-CM

## 2024-12-18 DIAGNOSIS — R05.3 CHRONIC COUGH: Primary | ICD-10-CM

## 2024-12-18 DIAGNOSIS — G40.309 GENERALIZED EPILEPSY (HCC): ICD-10-CM

## 2024-12-18 DIAGNOSIS — M08.80 JUVENILE IDIOPATHIC ARTHRITIS (HCC): ICD-10-CM

## 2024-12-18 DIAGNOSIS — D3A.8 NEUROENDOCRINE TUMOR (HCC): ICD-10-CM

## 2024-12-18 DIAGNOSIS — J45.41 MODERATE PERSISTENT ASTHMA WITH ACUTE EXACERBATION (HCC): ICD-10-CM

## 2024-12-18 DIAGNOSIS — N96 HISTORY OF MULTIPLE SPONTANEOUS ABORTIONS: ICD-10-CM

## 2024-12-18 DIAGNOSIS — N94.10 DYSPAREUNIA IN FEMALE: ICD-10-CM

## 2024-12-18 PROBLEM — M54.9 CHRONIC BACK PAIN: Status: RESOLVED | Noted: 2023-07-06 | Resolved: 2024-12-18

## 2024-12-18 PROBLEM — F43.23 ADJUSTMENT DISORDER WITH MIXED ANXIETY AND DEPRESSED MOOD: Status: RESOLVED | Noted: 2019-06-13 | Resolved: 2024-12-18

## 2024-12-18 PROBLEM — G89.29 CHRONIC BACK PAIN: Status: RESOLVED | Noted: 2023-07-06 | Resolved: 2024-12-18

## 2024-12-18 PROCEDURE — 99213 OFFICE O/P EST LOW 20 MIN: CPT | Performed by: NURSE PRACTITIONER
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
## 2025-01-03 ENCOUNTER — HOSPITAL ENCOUNTER (OUTPATIENT)
Dept: RESPIRATORY THERAPY | Facility: HOSPITAL | Age: 25
Discharge: HOME | End: 2025-01-03
Attending: INTERNAL MEDICINE
Payer: COMMERCIAL

## 2025-01-03 DIAGNOSIS — J45.41 MODERATE PERSISTENT ASTHMA WITH ACUTE EXACERBATION (HCC): ICD-10-CM

## 2025-01-03 DIAGNOSIS — E66.9 OBESITY (BMI 30-39.9): ICD-10-CM

## 2025-01-03 PROCEDURE — 94726 PLETHYSMOGRAPHY LUNG VOLUMES: CPT

## 2025-01-03 PROCEDURE — 94060 EVALUATION OF WHEEZING: CPT

## 2025-01-03 PROCEDURE — 94729 DIFFUSING CAPACITY: CPT

## 2025-01-07 NOTE — PROCEDURES
[facility]  part of [facility]    PFT Interpretation    Kaylie Vance     11/3/2000 MRN [id number]   Height  62 inh  Age 24 year old   Weight  201 lbs  Sex Female         Spirometry:   FEV1 2.29 L which is 80%  FVC 2.59 L which is 80%  FEV1/FVC ratio 88%    No significant bronchodilator response      FVL:   Normal      Lung Volume:   TLC 3.55 L which is 76%  VC 2.59 L she is 72%  RV/TLC ratio is normal      DLCO:   121% which is normal      Impression:   Mild restrictive ventilatory imitation otherwise normal PFTs  No significant bronchodilator response  Normal DLCO        Thank you for allowing me to participate in the care of your patient.    Kaylee Nick MD  2025  7:51 PM   yes

## 2025-01-07 NOTE — ADDENDUM NOTE
Encounter addended by: Kaylee Nick MD on: 1/6/2025 7:53 PM   Actions taken: Clinical Note Signed, Charge Capture section accepted

## 2025-01-21 ENCOUNTER — TELEPHONE (OUTPATIENT)
Dept: NEUROLOGY | Facility: CLINIC | Age: 25
End: 2025-01-21

## 2025-02-02 ENCOUNTER — HOSPITAL ENCOUNTER (OUTPATIENT)
Age: 25
Discharge: ANOTHER HOSPITAL | End: 2025-02-02
Payer: COMMERCIAL

## 2025-02-02 VITALS
SYSTOLIC BLOOD PRESSURE: 114 MMHG | RESPIRATION RATE: 18 BRPM | TEMPERATURE: 99 F | DIASTOLIC BLOOD PRESSURE: 51 MMHG | HEART RATE: 84 BPM | OXYGEN SATURATION: 100 %

## 2025-02-02 DIAGNOSIS — K92.1 BLOOD IN STOOL: ICD-10-CM

## 2025-02-02 DIAGNOSIS — R10.9 ABDOMINAL PAIN OF UNKNOWN ETIOLOGY: Primary | ICD-10-CM

## 2025-02-02 PROCEDURE — 99213 OFFICE O/P EST LOW 20 MIN: CPT | Performed by: NURSE PRACTITIONER
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
## 2025-02-05 ENCOUNTER — TELEMEDICINE (OUTPATIENT)
Dept: SURGERY | Facility: CLINIC | Age: 25
End: 2025-02-05
Payer: COMMERCIAL

## 2025-02-05 VITALS — WEIGHT: 202 LBS | BODY MASS INDEX: 35 KG/M2

## 2025-02-05 DIAGNOSIS — Z51.81 ENCOUNTER FOR THERAPEUTIC DRUG MONITORING: Primary | ICD-10-CM

## 2025-02-05 DIAGNOSIS — E88.819 INSULIN RESISTANCE: ICD-10-CM

## 2025-02-05 DIAGNOSIS — K59.00 CONSTIPATION, UNSPECIFIED CONSTIPATION TYPE: ICD-10-CM

## 2025-02-05 DIAGNOSIS — M54.9 CHRONIC BACK PAIN, UNSPECIFIED BACK LOCATION, UNSPECIFIED BACK PAIN LATERALITY: ICD-10-CM

## 2025-02-05 DIAGNOSIS — R63.2 INCREASED APPETITE: ICD-10-CM

## 2025-02-05 DIAGNOSIS — G89.29 CHRONIC BACK PAIN, UNSPECIFIED BACK LOCATION, UNSPECIFIED BACK PAIN LATERALITY: ICD-10-CM

## 2025-02-05 DIAGNOSIS — E66.9 OBESITY (BMI 30-39.9): ICD-10-CM

## 2025-02-05 RX ORDER — METFORMIN HYDROCHLORIDE 500 MG/1
500 TABLET, EXTENDED RELEASE ORAL DAILY
Qty: 30 TABLET | Refills: 3 | Status: SHIPPED | OUTPATIENT
Start: 2025-02-05

## 2025-02-05 RX ORDER — TOPIRAMATE 25 MG/1
25 TABLET, FILM COATED ORAL 2 TIMES DAILY
Qty: 60 TABLET | Refills: 3 | Status: SHIPPED | OUTPATIENT
Start: 2025-02-05
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
## 2025-02-19 ENCOUNTER — TELEPHONE (OUTPATIENT)
Dept: SURGERY | Facility: CLINIC | Age: 25
End: 2025-02-19

## 2025-02-19 ENCOUNTER — HOSPITAL ENCOUNTER (OUTPATIENT)
Age: 25
Discharge: HOME | End: 2025-02-19
Payer: COMMERCIAL

## 2025-02-19 VITALS
SYSTOLIC BLOOD PRESSURE: 112 MMHG | OXYGEN SATURATION: 100 % | DIASTOLIC BLOOD PRESSURE: 60 MMHG | RESPIRATION RATE: 16 BRPM | HEART RATE: 84 BPM | TEMPERATURE: 98 F

## 2025-02-19 DIAGNOSIS — L29.89 PRURITIC ERYTHEMATOUS RASH: ICD-10-CM

## 2025-02-19 DIAGNOSIS — E66.9 OBESITY (BMI 30-39.9): ICD-10-CM

## 2025-02-19 DIAGNOSIS — L70.8 ACNEIFORM RASH: Primary | ICD-10-CM

## 2025-02-19 DIAGNOSIS — G40.909 SEIZURE DISORDER (HCC): ICD-10-CM

## 2025-02-19 LAB
#MXD IC: 0.3 X10ˆ3/UL (ref 0.1–1)
BUN BLD-MCNC: 9 MG/DL (ref 7–18)
CHLORIDE BLD-SCNC: 105 MMOL/L (ref 98–112)
CO2 BLD-SCNC: 21 MMOL/L (ref 21–32)
CREAT BLD-MCNC: 0.6 MG/DL
EGFRCR SERPLBLD CKD-EPI 2021: 128 ML/MIN/1.73M2 (ref 60–?)
GLUCOSE BLD-MCNC: 85 MG/DL (ref 70–99)
HCT VFR BLD AUTO: 37.2 %
HCT VFR BLD CALC: 41 %
HGB BLD-MCNC: 11.9 G/DL
ISTAT IONIZED CALCIUM FOR CHEM 8: 1.22 MMOL/L (ref 1.12–1.32)
LYMPHOCYTES # BLD AUTO: 2 X10ˆ3/UL (ref 1–4)
LYMPHOCYTES NFR BLD AUTO: 39.6 %
MCH RBC QN AUTO: 26.3 PG (ref 26–34)
MCHC RBC AUTO-ENTMCNC: 32 G/DL (ref 31–37)
MCV RBC AUTO: 82.3 FL (ref 80–100)
MIXED CELL %: 6.3 %
NEUTROPHILS # BLD AUTO: 2.8 X10ˆ3/UL (ref 1.5–7.7)
NEUTROPHILS NFR BLD AUTO: 54.1 %
PLATELET # BLD AUTO: 245 X10ˆ3/UL (ref 150–450)
POTASSIUM BLD-SCNC: 3.7 MMOL/L (ref 3.6–5.1)
RBC # BLD AUTO: 4.52 X10ˆ6/UL
SODIUM BLD-SCNC: 141 MMOL/L (ref 136–145)
WBC # BLD AUTO: 5.1 X10ˆ3/UL (ref 4–11)

## 2025-02-19 PROCEDURE — 85025 COMPLETE CBC W/AUTO DIFF WBC: CPT | Performed by: PHYSICIAN ASSISTANT

## 2025-02-19 PROCEDURE — 80047 BASIC METABLC PNL IONIZED CA: CPT | Performed by: PHYSICIAN ASSISTANT

## 2025-02-19 PROCEDURE — 99214 OFFICE O/P EST MOD 30 MIN: CPT | Performed by: PHYSICIAN ASSISTANT

## 2025-02-19 RX ORDER — TRIAMCINOLONE ACETONIDE 1 MG/G
1 CREAM TOPICAL 2 TIMES DAILY
Qty: 30 G | Refills: 0 | Status: SHIPPED | OUTPATIENT
Start: 2025-02-19

## 2025-02-19 RX ORDER — METHYLPREDNISOLONE 4 MG/1
TABLET ORAL
Qty: 1 EACH | Refills: 0 | Status: SHIPPED | OUTPATIENT
Start: 2025-02-19
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
## 2025-02-19 NOTE — ED INITIAL ASSESSMENT (HPI)
Pt c/o rash to chest x9 days, spreading to R axilla yesterday.  No fever.  No new foods, meds or products.  No SOB.   No lip swelling.

## 2025-02-19 NOTE — TELEPHONE ENCOUNTER
Patient was in [facility] due to a reaction they were having to the medication Topiramate.Please contact patient as soon as possible. Please advise thank you.

## 2025-02-19 NOTE — TELEPHONE ENCOUNTER
Spoke to patient. Reports that she developed hives after starting topiramate. Has stopped the medication. S/p urgent care visit. Plans to apply cream and start medrol dose pack. Will keep follow up appointment, continue metformin. All questions answered.

## 2025-02-19 NOTE — DISCHARGE INSTRUCTIONS
Yes, topiramate can cause an acneiform rash. Topiramate is an anticonvulsant medication used to treat seizures, migraines, and other conditions.     Treatment  In some cases, an acneiform rash caused by topiramate can be treated with: Symptomatic treatment, Withdrawing topiramate, and Allowing the lesions to slowly regress.     Serious adverse skin reactions to topiramate are rare.

## 2025-03-26 ENCOUNTER — OFFICE VISIT (OUTPATIENT)
Dept: SURGERY | Facility: CLINIC | Age: 25
End: 2025-03-26
Payer: COMMERCIAL

## 2025-03-26 VITALS
HEIGHT: 63.8 IN | DIASTOLIC BLOOD PRESSURE: 66 MMHG | BODY MASS INDEX: 35.14 KG/M2 | OXYGEN SATURATION: 99 % | WEIGHT: 203.31 LBS | HEART RATE: 86 BPM | SYSTOLIC BLOOD PRESSURE: 100 MMHG

## 2025-03-26 DIAGNOSIS — K59.00 CONSTIPATION, UNSPECIFIED CONSTIPATION TYPE: ICD-10-CM

## 2025-03-26 DIAGNOSIS — E88.819 INSULIN RESISTANCE: ICD-10-CM

## 2025-03-26 DIAGNOSIS — Z51.81 ENCOUNTER FOR THERAPEUTIC DRUG MONITORING: Primary | ICD-10-CM

## 2025-03-26 DIAGNOSIS — R63.2 INCREASED APPETITE: ICD-10-CM

## 2025-03-26 DIAGNOSIS — G89.29 CHRONIC BACK PAIN, UNSPECIFIED BACK LOCATION, UNSPECIFIED BACK PAIN LATERALITY: ICD-10-CM

## 2025-03-26 DIAGNOSIS — M54.9 CHRONIC BACK PAIN, UNSPECIFIED BACK LOCATION, UNSPECIFIED BACK PAIN LATERALITY: ICD-10-CM

## 2025-03-26 DIAGNOSIS — E66.9 OBESITY (BMI 30-39.9): ICD-10-CM

## 2025-03-26 PROCEDURE — 3078F DIAST BP <80 MM HG: CPT | Performed by: NURSE PRACTITIONER

## 2025-03-26 PROCEDURE — 99213 OFFICE O/P EST LOW 20 MIN: CPT | Performed by: NURSE PRACTITIONER

## 2025-03-26 PROCEDURE — 3074F SYST BP LT 130 MM HG: CPT | Performed by: NURSE PRACTITIONER

## 2025-03-26 PROCEDURE — 3008F BODY MASS INDEX DOCD: CPT | Performed by: NURSE PRACTITIONER

## 2025-03-26 RX ORDER — TIRZEPATIDE 2.5 MG/.5ML
2.5 INJECTION, SOLUTION SUBCUTANEOUS WEEKLY
Qty: 2 ML | Refills: 2 | Status: SHIPPED | OUTPATIENT
Start: 2025-03-26
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
## 2025-04-03 DIAGNOSIS — C7A.8 NEUROENDOCRINE CARCINOMA  (CMD): Primary | ICD-10-CM

## 2025-05-15 ENCOUNTER — MED REC SCAN ONLY (OUTPATIENT)
Dept: FAMILY MEDICINE CLINIC | Facility: CLINIC | Age: 25
End: 2025-05-15

## 2025-05-20 ENCOUNTER — HOSPITAL ENCOUNTER (OUTPATIENT)
Age: 25
Discharge: HOME | End: 2025-05-20
Attending: INTERNAL MEDICINE

## 2025-05-20 DIAGNOSIS — C7A.8 NEUROENDOCRINE CARCINOMA  (CMD): ICD-10-CM

## 2025-05-20 PROCEDURE — A9592 HB RX 343: HCPCS | Performed by: INTERNAL MEDICINE

## 2025-05-20 PROCEDURE — 10006150 HB RX 343: Performed by: INTERNAL MEDICINE

## 2025-05-20 PROCEDURE — A9587 GALLIUM GA-68: HCPCS | Performed by: INTERNAL MEDICINE

## 2025-05-20 PROCEDURE — 78815 PET IMAGE W/CT SKULL-THIGH: CPT

## 2025-05-20 RX ADMIN — 68GA-DOTATATE 5.3 MILLICURIE: KIT INTRAVENOUS at 10:03

## 2025-05-28 ENCOUNTER — OFFICE VISIT (OUTPATIENT)
Dept: FAMILY MEDICINE CLINIC | Facility: CLINIC | Age: 25
End: 2025-05-28
Payer: COMMERCIAL

## 2025-05-28 ENCOUNTER — LAB ENCOUNTER (OUTPATIENT)
Dept: LAB | Age: 25
End: 2025-05-28
Payer: COMMERCIAL

## 2025-05-28 VITALS
SYSTOLIC BLOOD PRESSURE: 106 MMHG | WEIGHT: 205 LBS | TEMPERATURE: 97 F | DIASTOLIC BLOOD PRESSURE: 72 MMHG | HEIGHT: 63 IN | OXYGEN SATURATION: 98 % | RESPIRATION RATE: 18 BRPM | HEART RATE: 97 BPM | BODY MASS INDEX: 36.32 KG/M2

## 2025-05-28 DIAGNOSIS — Z01.84 IMMUNITY STATUS TESTING: ICD-10-CM

## 2025-05-28 DIAGNOSIS — Z00.00 ROUTINE PHYSICAL EXAMINATION: Primary | ICD-10-CM

## 2025-05-28 DIAGNOSIS — Z00.00 ROUTINE PHYSICAL EXAMINATION: ICD-10-CM

## 2025-05-28 DIAGNOSIS — Z02.83 ENCOUNTER FOR DRUG SCREENING: ICD-10-CM

## 2025-05-28 DIAGNOSIS — Z11.1 SCREENING-PULMONARY TB: ICD-10-CM

## 2025-05-28 DIAGNOSIS — M54.42 CHRONIC LEFT-SIDED LOW BACK PAIN WITH LEFT-SIDED SCIATICA: ICD-10-CM

## 2025-05-28 DIAGNOSIS — D3A.8 NEUROENDOCRINE TUMOR (HCC): ICD-10-CM

## 2025-05-28 DIAGNOSIS — G40.309 GENERALIZED EPILEPSY (HCC): ICD-10-CM

## 2025-05-28 DIAGNOSIS — G89.29 CHRONIC LEFT-SIDED LOW BACK PAIN WITH LEFT-SIDED SCIATICA: ICD-10-CM

## 2025-05-28 DIAGNOSIS — K92.1 BLOOD IN STOOL: ICD-10-CM

## 2025-05-28 LAB
ALBUMIN SERPL-MCNC: 4.9 G/DL (ref 3.2–4.8)
ALBUMIN/GLOB SERPL: 1.8 {RATIO} (ref 1–2)
ALP LIVER SERPL-CCNC: 92 U/L (ref 37–98)
ALT SERPL-CCNC: <7 U/L (ref 10–49)
AMPHET UR QL SCN: NEGATIVE
ANION GAP SERPL CALC-SCNC: 7 MMOL/L (ref 0–18)
AST SERPL-CCNC: 12 U/L (ref ?–34)
BARBITURATES UR QL SCN: NEGATIVE
BASOPHILS # BLD AUTO: 0.03 X10(3) UL (ref 0–0.2)
BASOPHILS NFR BLD AUTO: 0.5 %
BENZODIAZ UR QL SCN: NEGATIVE
BILIRUB SERPL-MCNC: 0.6 MG/DL (ref 0.3–1.2)
BUN BLD-MCNC: 7 MG/DL (ref 9–23)
BUN/CREAT SERPL: 10.1 (ref 10–20)
CALCIUM BLD-MCNC: 9 MG/DL (ref 8.7–10.4)
CANNABINOIDS UR QL SCN: NEGATIVE
CHLORIDE SERPL-SCNC: 106 MMOL/L (ref 98–112)
CHOLEST SERPL-MCNC: 121 MG/DL (ref ?–200)
CO2 SERPL-SCNC: 27 MMOL/L (ref 21–32)
COCAINE UR QL: NEGATIVE
CREAT BLD-MCNC: 0.69 MG/DL (ref 0.55–1.02)
CREAT UR-SCNC: 83.6 MG/DL
DEPRECATED RDW RBC AUTO: 45.6 FL (ref 35.1–46.3)
EGFRCR SERPLBLD CKD-EPI 2021: 124 ML/MIN/1.73M2 (ref 60–?)
EOSINOPHIL # BLD AUTO: 0.07 X10(3) UL (ref 0–0.7)
EOSINOPHIL NFR BLD AUTO: 1.1 %
ERYTHROCYTE [DISTWIDTH] IN BLOOD BY AUTOMATED COUNT: 14.9 % (ref 11–15)
EST. AVERAGE GLUCOSE BLD GHB EST-MCNC: 100 MG/DL (ref 68–126)
FASTING PATIENT LIPID ANSWER: NO
FASTING STATUS PATIENT QL REPORTED: NO
FENTANYL UR QL SCN: NEGATIVE
GLOBULIN PLAS-MCNC: 2.8 G/DL (ref 2–3.5)
GLUCOSE BLD-MCNC: 85 MG/DL (ref 70–99)
HBA1C MFR BLD: 5.1 % (ref ?–5.7)
HBV SURFACE AB SER QL: REACTIVE
HBV SURFACE AB SERPL IA-ACNC: 36.29 MIU/ML
HCT VFR BLD AUTO: 36.3 % (ref 35–48)
HDLC SERPL-MCNC: 41 MG/DL (ref 40–59)
HGB BLD-MCNC: 11.6 G/DL (ref 12–16)
IMM GRANULOCYTES # BLD AUTO: 0.01 X10(3) UL (ref 0–1)
IMM GRANULOCYTES NFR BLD: 0.2 %
LDLC SERPL CALC-MCNC: 63 MG/DL (ref ?–100)
LYMPHOCYTES # BLD AUTO: 2.02 X10(3) UL (ref 1–4)
LYMPHOCYTES NFR BLD AUTO: 32.5 %
MCH RBC QN AUTO: 27 PG (ref 26–34)
MCHC RBC AUTO-ENTMCNC: 32 G/DL (ref 31–37)
MCV RBC AUTO: 84.6 FL (ref 80–100)
MDMA UR QL SCN: NEGATIVE
METHADONE UR QL SCN: NEGATIVE
MONOCYTES # BLD AUTO: 0.33 X10(3) UL (ref 0.1–1)
MONOCYTES NFR BLD AUTO: 5.3 %
NEUTROPHILS # BLD AUTO: 3.75 X10 (3) UL (ref 1.5–7.7)
NEUTROPHILS # BLD AUTO: 3.75 X10(3) UL (ref 1.5–7.7)
NEUTROPHILS NFR BLD AUTO: 60.4 %
NONHDLC SERPL-MCNC: 80 MG/DL (ref ?–130)
OPIATES UR QL SCN: NEGATIVE
OSMOLALITY SERPL CALC.SUM OF ELEC: 287 MOSM/KG (ref 275–295)
OXYCODONE UR QL SCN: NEGATIVE
PCP UR QL SCN: NEGATIVE
PLATELET # BLD AUTO: 263 10(3)UL (ref 150–450)
POTASSIUM SERPL-SCNC: 4.1 MMOL/L (ref 3.5–5.1)
PROT SERPL-MCNC: 7.7 G/DL (ref 5.7–8.2)
RBC # BLD AUTO: 4.29 X10(6)UL (ref 3.8–5.3)
RUBV IGG SER QL: POSITIVE
RUBV IGG SER-ACNC: 72 IU/ML (ref 10–?)
SODIUM SERPL-SCNC: 140 MMOL/L (ref 136–145)
TRIGL SERPL-MCNC: 85 MG/DL (ref 30–149)
TSI SER-ACNC: 0.79 UIU/ML (ref 0.55–4.78)
VLDLC SERPL CALC-MCNC: 13 MG/DL (ref 0–30)
WBC # BLD AUTO: 6.2 X10(3) UL (ref 4–11)

## 2025-05-28 PROCEDURE — 86706 HEP B SURFACE ANTIBODY: CPT

## 2025-05-28 PROCEDURE — 83036 HEMOGLOBIN GLYCOSYLATED A1C: CPT

## 2025-05-28 PROCEDURE — 86735 MUMPS ANTIBODY: CPT

## 2025-05-28 PROCEDURE — 86765 RUBEOLA ANTIBODY: CPT

## 2025-05-28 PROCEDURE — 86762 RUBELLA ANTIBODY: CPT

## 2025-05-28 PROCEDURE — 80061 LIPID PANEL: CPT

## 2025-05-28 PROCEDURE — 36415 COLL VENOUS BLD VENIPUNCTURE: CPT

## 2025-05-28 PROCEDURE — 86787 VARICELLA-ZOSTER ANTIBODY: CPT

## 2025-05-28 PROCEDURE — 84443 ASSAY THYROID STIM HORMONE: CPT

## 2025-05-28 PROCEDURE — 80307 DRUG TEST PRSMV CHEM ANLYZR: CPT

## 2025-05-28 PROCEDURE — 80053 COMPREHEN METABOLIC PANEL: CPT

## 2025-05-28 PROCEDURE — 86480 TB TEST CELL IMMUN MEASURE: CPT

## 2025-05-28 PROCEDURE — 85025 COMPLETE CBC W/AUTO DIFF WBC: CPT
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
## 2025-05-30 LAB
M TB IFN-G CD4+ T-CELLS BLD-ACNC: 0.05 IU/ML
M TB TUBERC IFN-G BLD QL: NEGATIVE
M TB TUBERC IGNF/MITOGEN IGNF CONTROL: >10 IU/ML
MEV IGG SER-ACNC: >300 AU/ML (ref 16.5–?)
MUV IGG SER IA-ACNC: >300 AU/ML (ref 11–?)
QFT TB1 AG MINUS NIL: 0 IU/ML
QFT TB2 AG MINUS NIL: -0.01 IU/ML
VZV IGG SER IA-ACNC: 2.2 (ref 1–?)

## 2025-06-03 ENCOUNTER — TELEPHONE (OUTPATIENT)
Dept: GASTROENTEROLOGY | Age: 25
End: 2025-06-03

## 2025-06-03 ENCOUNTER — CLINICAL DOCUMENTATION (OUTPATIENT)
Dept: GASTROENTEROLOGY | Age: 25
End: 2025-06-03

## 2025-06-03 DIAGNOSIS — E66.9 OBESITY (BMI 30-39.9): ICD-10-CM

## 2025-06-03 RX ORDER — TIRZEPATIDE 5 MG/.5ML
5 INJECTION, SOLUTION SUBCUTANEOUS WEEKLY
Qty: 2 ML | Refills: 5 | Status: SHIPPED | OUTPATIENT
Start: 2025-06-03

## 2025-06-03 RX ORDER — TIRZEPATIDE 2.5 MG/.5ML
2.5 INJECTION, SOLUTION SUBCUTANEOUS WEEKLY
Qty: 2 ML | Refills: 0 | Status: SHIPPED | OUTPATIENT
Start: 2025-06-03 | End: 2025-06-03 | Stop reason: ALTCHOICE

## 2025-07-15 ENCOUNTER — TELEMEDICINE (OUTPATIENT)
Dept: FAMILY MEDICINE CLINIC | Facility: CLINIC | Age: 25
End: 2025-07-15
Payer: COMMERCIAL

## 2025-07-15 DIAGNOSIS — D3A.8 NEUROENDOCRINE TUMOR (HCC): ICD-10-CM

## 2025-07-15 DIAGNOSIS — D35.2 PITUITARY MACROADENOMA (HCC): ICD-10-CM

## 2025-07-15 DIAGNOSIS — E65 ABDOMINAL PANNUS: Primary | ICD-10-CM

## 2025-07-15 DIAGNOSIS — N94.10 DYSPAREUNIA IN FEMALE: ICD-10-CM

## 2025-07-15 DIAGNOSIS — M54.42 CHRONIC LEFT-SIDED LOW BACK PAIN WITH LEFT-SIDED SCIATICA: ICD-10-CM

## 2025-07-15 DIAGNOSIS — R55 SYNCOPE AND COLLAPSE: ICD-10-CM

## 2025-07-15 DIAGNOSIS — G40.309 GENERALIZED EPILEPSY (HCC): ICD-10-CM

## 2025-07-15 DIAGNOSIS — G89.29 CHRONIC LEFT-SIDED LOW BACK PAIN WITH LEFT-SIDED SCIATICA: ICD-10-CM

## 2025-07-15 PROCEDURE — 98005 SYNCH AUDIO-VIDEO EST LOW 20: CPT | Performed by: NURSE PRACTITIONER
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
## 2025-07-16 PROBLEM — E65 ABDOMINAL PANNUS: Status: ACTIVE | Noted: 2025-07-16

## 2025-07-16 PROBLEM — O21.9 NAUSEA AND VOMITING DURING PREGNANCY (HCC): Status: RESOLVED | Noted: 2024-02-16 | Resolved: 2025-07-16

## 2025-07-31 ENCOUNTER — HOSPITAL ENCOUNTER (OUTPATIENT)
Age: 25
Discharge: HOME | End: 2025-07-31

## 2025-07-31 ENCOUNTER — APPOINTMENT (OUTPATIENT)
Dept: GENERAL RADIOLOGY | Age: 25
End: 2025-07-31
Attending: PHYSICIAN ASSISTANT

## 2025-07-31 VITALS
DIASTOLIC BLOOD PRESSURE: 54 MMHG | HEART RATE: 71 BPM | SYSTOLIC BLOOD PRESSURE: 102 MMHG | OXYGEN SATURATION: 100 % | RESPIRATION RATE: 17 BRPM | TEMPERATURE: 98 F

## 2025-07-31 DIAGNOSIS — R06.02 SOB (SHORTNESS OF BREATH): Primary | ICD-10-CM

## 2025-07-31 DIAGNOSIS — J40 BRONCHITIS: ICD-10-CM

## 2025-07-31 LAB
#MXD IC: 0.3 X10ˆ3/UL (ref 0.1–1)
B-HCG UR QL: NEGATIVE
BUN BLD-MCNC: 6 MG/DL (ref 7–18)
CHLORIDE BLD-SCNC: 104 MMOL/L (ref 98–112)
CO2 BLD-SCNC: 24 MMOL/L (ref 21–32)
CREAT BLD-MCNC: 0.7 MG/DL (ref 0.55–1.02)
EGFRCR SERPLBLD CKD-EPI 2021: 124 ML/MIN/1.73M2 (ref 60–?)
GLUCOSE BLD-MCNC: 86 MG/DL (ref 70–99)
HCT VFR BLD AUTO: 34.9 % (ref 35–48)
HCT VFR BLD CALC: 37 % (ref 34–50)
HGB BLD-MCNC: 11.2 G/DL (ref 12–16)
ISTAT IONIZED CALCIUM FOR CHEM 8: 1.06 MMOL/L (ref 1.12–1.32)
LYMPHOCYTES # BLD AUTO: 2 X10ˆ3/UL (ref 1–4)
LYMPHOCYTES NFR BLD AUTO: 41.1 %
MCH RBC QN AUTO: 26.9 PG (ref 26–34)
MCHC RBC AUTO-ENTMCNC: 32.1 G/DL (ref 31–37)
MCV RBC AUTO: 83.9 FL (ref 80–100)
MIXED CELL %: 6 %
NEUTROPHILS # BLD AUTO: 2.5 X10ˆ3/UL (ref 1.5–7.7)
NEUTROPHILS NFR BLD AUTO: 52.9 %
PLATELET # BLD AUTO: 207 X10ˆ3/UL (ref 150–450)
POTASSIUM BLD-SCNC: 3.6 MMOL/L (ref 3.6–5.1)
RBC # BLD AUTO: 4.16 X10ˆ6/UL (ref 3.8–5.3)
SODIUM BLD-SCNC: 142 MMOL/L (ref 136–145)
TROPONIN I BLD-MCNC: <0.02 NG/ML (ref ?–0.05)
WBC # BLD AUTO: 4.8 X10ˆ3/UL (ref 4–11)

## 2025-07-31 PROCEDURE — 80047 BASIC METABLC PNL IONIZED CA: CPT | Performed by: PHYSICIAN ASSISTANT

## 2025-07-31 PROCEDURE — 99214 OFFICE O/P EST MOD 30 MIN: CPT | Performed by: PHYSICIAN ASSISTANT

## 2025-07-31 PROCEDURE — 84484 ASSAY OF TROPONIN QUANT: CPT | Performed by: PHYSICIAN ASSISTANT

## 2025-07-31 PROCEDURE — 85025 COMPLETE CBC W/AUTO DIFF WBC: CPT | Performed by: PHYSICIAN ASSISTANT

## 2025-07-31 PROCEDURE — 81025 URINE PREGNANCY TEST: CPT | Performed by: PHYSICIAN ASSISTANT

## 2025-07-31 PROCEDURE — 71046 X-RAY EXAM CHEST 2 VIEWS: CPT | Performed by: PHYSICIAN ASSISTANT

## 2025-07-31 PROCEDURE — 93000 ELECTROCARDIOGRAM COMPLETE: CPT | Performed by: PHYSICIAN ASSISTANT

## 2025-07-31 RX ORDER — PREDNISONE 20 MG/1
40 TABLET ORAL DAILY
Qty: 10 TABLET | Refills: 0 | Status: SHIPPED | OUTPATIENT
Start: 2025-07-31 | End: 2025-08-05

## 2025-07-31 RX ORDER — ALBUTEROL SULFATE 90 UG/1
2 INHALANT RESPIRATORY (INHALATION) EVERY 4 HOURS PRN
Qty: 1 EACH | Refills: 0 | Status: SHIPPED | OUTPATIENT
Start: 2025-07-31 | End: 2025-08-30

## 2025-07-31 RX ORDER — ALBUTEROL SULFATE 0.83 MG/ML
2.5 SOLUTION RESPIRATORY (INHALATION) EVERY 4 HOURS PRN
Qty: 30 EACH | Refills: 0 | Status: SHIPPED | OUTPATIENT
Start: 2025-07-31 | End: 2025-08-30

## 2025-08-01 ENCOUNTER — PATIENT MESSAGE (OUTPATIENT)
Dept: FAMILY MEDICINE CLINIC | Facility: CLINIC | Age: 25
End: 2025-08-01

## 2025-08-01 LAB
ATRIAL RATE: 64 BPM
P AXIS: 31 DEGREES
P-R INTERVAL: 150 MS
Q-T INTERVAL: 382 MS
QRS DURATION: 86 MS
QTC CALCULATION (BEZET): 394 MS
R AXIS: 13 DEGREES
T AXIS: 18 DEGREES
VENTRICULAR RATE: 64 BPM

## 2025-08-07 ENCOUNTER — OFFICE VISIT (OUTPATIENT)
Dept: SURGERY | Facility: CLINIC | Age: 25
End: 2025-08-07

## 2025-08-07 VITALS
HEIGHT: 62 IN | HEART RATE: 90 BPM | SYSTOLIC BLOOD PRESSURE: 110 MMHG | OXYGEN SATURATION: 99 % | WEIGHT: 192 LBS | DIASTOLIC BLOOD PRESSURE: 70 MMHG | BODY MASS INDEX: 35.33 KG/M2

## 2025-08-07 DIAGNOSIS — L30.4 INTERTRIGO: ICD-10-CM

## 2025-08-07 DIAGNOSIS — R63.2 INCREASED APPETITE: ICD-10-CM

## 2025-08-07 DIAGNOSIS — M54.9 CHRONIC BACK PAIN, UNSPECIFIED BACK LOCATION, UNSPECIFIED BACK PAIN LATERALITY: ICD-10-CM

## 2025-08-07 DIAGNOSIS — E66.9 OBESITY (BMI 30-39.9): ICD-10-CM

## 2025-08-07 DIAGNOSIS — E88.819 INSULIN RESISTANCE: ICD-10-CM

## 2025-08-07 DIAGNOSIS — G89.29 CHRONIC BACK PAIN, UNSPECIFIED BACK LOCATION, UNSPECIFIED BACK PAIN LATERALITY: ICD-10-CM

## 2025-08-07 DIAGNOSIS — Z51.81 ENCOUNTER FOR THERAPEUTIC DRUG MONITORING: Primary | ICD-10-CM

## 2025-08-07 PROCEDURE — 3008F BODY MASS INDEX DOCD: CPT | Performed by: NURSE PRACTITIONER

## 2025-08-07 PROCEDURE — 3078F DIAST BP <80 MM HG: CPT | Performed by: NURSE PRACTITIONER

## 2025-08-07 PROCEDURE — 99213 OFFICE O/P EST LOW 20 MIN: CPT | Performed by: NURSE PRACTITIONER

## 2025-08-07 PROCEDURE — 3074F SYST BP LT 130 MM HG: CPT | Performed by: NURSE PRACTITIONER

## 2025-08-07 RX ORDER — TIRZEPATIDE 7.5 MG/.5ML
7.5 INJECTION, SOLUTION SUBCUTANEOUS WEEKLY
Qty: 2 ML | Refills: 2 | Status: SHIPPED | OUTPATIENT
Start: 2025-08-07 | End: 2025-08-08 | Stop reason: ALTCHOICE

## 2025-08-07 RX ORDER — KETOCONAZOLE 20 MG/G
1 CREAM TOPICAL DAILY
Qty: 60 G | Refills: 5 | Status: SHIPPED | OUTPATIENT
Start: 2025-08-07

## 2025-08-08 ENCOUNTER — PATIENT MESSAGE (OUTPATIENT)
Dept: FAMILY MEDICINE CLINIC | Facility: CLINIC | Age: 25
End: 2025-08-08

## 2025-08-08 ENCOUNTER — OFFICE VISIT (OUTPATIENT)
Dept: FAMILY MEDICINE CLINIC | Facility: CLINIC | Age: 25
End: 2025-08-08

## 2025-08-08 VITALS
TEMPERATURE: 97 F | WEIGHT: 188.38 LBS | SYSTOLIC BLOOD PRESSURE: 98 MMHG | HEIGHT: 62 IN | BODY MASS INDEX: 34.67 KG/M2 | DIASTOLIC BLOOD PRESSURE: 66 MMHG | HEART RATE: 94 BPM

## 2025-08-08 DIAGNOSIS — D3A.8 NEUROENDOCRINE TUMOR (HCC): ICD-10-CM

## 2025-08-08 DIAGNOSIS — N96 HISTORY OF MULTIPLE SPONTANEOUS ABORTIONS: Primary | ICD-10-CM

## 2025-08-08 DIAGNOSIS — D35.2 PITUITARY MACROADENOMA (HCC): ICD-10-CM

## 2025-08-08 DIAGNOSIS — D64.9 ANEMIA, UNSPECIFIED TYPE: Primary | ICD-10-CM

## 2025-08-08 DIAGNOSIS — E55.9 VITAMIN D DEFICIENCY: ICD-10-CM

## 2025-08-08 DIAGNOSIS — G40.309 GENERALIZED EPILEPSY (HCC): ICD-10-CM

## 2025-08-08 DIAGNOSIS — M54.16 LUMBAR RADICULOPATHY: ICD-10-CM

## 2025-08-08 DIAGNOSIS — J45.41 MODERATE PERSISTENT ASTHMA WITH ACUTE EXACERBATION (HCC): ICD-10-CM

## 2025-08-08 PROCEDURE — 3074F SYST BP LT 130 MM HG: CPT | Performed by: NURSE PRACTITIONER

## 2025-08-08 PROCEDURE — 3008F BODY MASS INDEX DOCD: CPT | Performed by: NURSE PRACTITIONER

## 2025-08-08 PROCEDURE — 3078F DIAST BP <80 MM HG: CPT | Performed by: NURSE PRACTITIONER

## 2025-08-08 PROCEDURE — 99214 OFFICE O/P EST MOD 30 MIN: CPT | Performed by: NURSE PRACTITIONER

## 2025-08-08 RX ORDER — CHOLECALCIFEROL (VITAMIN D3) 50 MCG
2000 TABLET ORAL AS DIRECTED
Qty: 90 TABLET | Refills: 3 | Status: SHIPPED | OUTPATIENT
Start: 2025-08-08

## 2025-08-08 RX ORDER — MONTELUKAST SODIUM 10 MG/1
10 TABLET ORAL NIGHTLY
Qty: 90 TABLET | Refills: 3 | Status: SHIPPED | OUTPATIENT
Start: 2025-08-08

## 2025-08-08 RX ORDER — FERROUS SULFATE 325(65) MG
325 TABLET ORAL
Qty: 30 TABLET | Refills: 1 | Status: SHIPPED | OUTPATIENT
Start: 2025-08-08

## 2025-08-08 RX ORDER — FLUTICASONE PROPIONATE AND SALMETEROL 250; 50 UG/1; UG/1
1 POWDER RESPIRATORY (INHALATION) 2 TIMES DAILY
Qty: 1 EACH | Refills: 5 | Status: SHIPPED | OUTPATIENT
Start: 2025-08-08

## 2025-08-20 ENCOUNTER — TELEPHONE (OUTPATIENT)
Dept: FAMILY MEDICINE CLINIC | Facility: CLINIC | Age: 25
End: 2025-08-20

## 2025-08-25 ENCOUNTER — TELEPHONE (OUTPATIENT)
Dept: ADMINISTRATIVE | Age: 25
End: 2025-08-25

## (undated) NOTE — LETTER
VACCINE ADMINISTRATION RECORD  PARENT / GUARDIAN APPROVAL  Date: 2023  Vaccine administered to: Javi Pineda     : 11/3/2000    MRN: [id number]    A copy of the appropriate Centers for Disease Control and Prevention Vaccine Information statement has been provided. I have read or have had explained the information about the diseases and the vaccines listed below. There was an opportunity to ask questions and any questions were answered satisfactorily. I believe that I understand the benefits and risks of the vaccine cited and ask that the vaccine(s) listed below be given to me or to the person named above (for whom I am authorized to make this request). VACCINES ADMINISTERED:  Gardasil    I have read and hereby agree to be bound by the terms of this agreement as stated above. My signature is valid until revoked by me in writing.   This document is signed by Javi Pineda, relationship: Self on 2023.:                                                                                                                                         Parent / Lon Newsome Signature                                                Date

## (undated) NOTE — LETTER
9/5/2023          To Whom It May Concern:    Alcides Schulte is currently under my medical care and may return to work at this time. Please excuse Ezequiel Sunshine from 8/30/23-9/5/23  She may return to work on 9/6/23. Activity is restricted as follows: none. If you require additional information please contact our office.         Sincerely,      REJI Venegas          Document generated by:  REJI Venegas

## (undated) NOTE — LETTER
7/10/2023          To Whom It May Concern:    Nicole Paul is currently under my medical care and may not return to work at this time. Please excuse Zayda Harford for 3 days. She may return to work on 7/13/2023. Activity is restricted as follows: none. If you require additional information please contact our office.         Sincerely,      Verito Tierney, APRN          Document generated by:  Verito Tierney, APRN

## (undated) NOTE — LETTER
11/3/2023          To Whom It May Concern:    Lindsay Zayas is currently under my medical care and may not return to work at this time. Please excuse Lisa Malaika for 5 days. She may return to work on 11/6/2023. Activity is restricted as follows: none. If you require additional information please contact our office.         Sincerely,      REJI Bradford          Document generated by:  REJI Bradford

## (undated) NOTE — LETTER
8/28/2024          To Whom It May Concern:    Kaylie Vance is currently under my medical care and may not return to work at this time.    Please excuse Kaylie for 2 weeks.  She will be re-evaluated in 2 weeks to determine potential return to work date along with any restrictions.    If you require additional information please contact our office.        Sincerely,      REJI Holland          Document generated by:  REJI Holland

## (undated) NOTE — LETTER
9/13/2024          To Whom It May Concern:    Kaylie Vance is currently under my medical care and may not return to work at this time.    She will follow up in 2 weeks for re-evaluation and possible return to work date.     If you require additional information please contact our office.        Sincerely,      REJI Holland          Document generated by:  REJI Holland

## (undated) NOTE — LETTER
7/18/2023          To Whom It May Concern:    Jaqui Núñez is currently under my medical care and may not return to work at this time. Please excuse Louis Villar for 3 days. She may return to work on 7/13/23. Activity is restricted as follows: none, may resume all duties and responsibilities. If you require additional information please contact our office. Sincerely,    REJI Plaza          Document generated by:   Wander Harrell RN

## (undated) NOTE — LETTER
[facility] [address], [facility]   Date:   12/29/2023     Name:   Juan Pablo Smith    YOB: 2000   MRN:   [id number]       WHERE IS YOUR PAIN NOW? Maddi the areas on your body where you feel the described sensations. Use the appropriate symbol. Susan Shoemaker the areas of radiation. Include all affected areas. Just to complete the picture, please draw in the face. ACHE:  ^ ^ ^   NUMBNESS:  0000   PINS & NEEDLES:  = = = =                              ^ ^ ^                       0000              = = = =                                    ^ ^ ^                       0000            = = = =      BURNING:  XXXX   STABBING: ////                  XXXX                ////                         XXXX          ////     Please maddi the line below indicating your degree of pain right now  with 0 being no pain 10 being the worst pain possible.                                          0             1             2              3             4              5              6              7             8             9             10         Patient Signature:

## (undated) NOTE — LETTER
12/18/2024              Kaylie Vance        [address] , [address] [facility] [address]       To Whom It May Concern:      Kaylie Vance is currently under my medical are for significant medical issues. These medical issues require the assessment and treatment of these issues by specialists, some of which are out of state.  Due to these appointments, she may need to miss work.       Sincerely,      REJI Holland          Document electronically generated by:  REJI Holland

## (undated) NOTE — LETTER
11/3/2023          To Whom It May Concern:    Jaqui Núñez is currently under my medical care. Please excuse Louis Villar for 2 days. She may return to full duty at work on 11/6/2023. Activity is restricted as follows: none. If you require additional information please contact our office.         Sincerely,      REJI Plaza          Document generated by:  REJI Plaza

## (undated) NOTE — LETTER
12/18/2024              Kaylie Vance        [address] , [address] [facility] [address]         To Whom It May Concern:      Kaylie Vance is currently under my medical are for significant medical issues. These medical issues require the assessment and treatment of these issues by specialists, some of which are out of state.  Due to these appointments, she may need to miss work.       Sincerely,      REJI Holland                Document electronically generated by:  REJI Holland

## (undated) NOTE — LETTER
[facility] [address], [facility]   Date:   11/29/2023     Name:   Jeronimo Mata    YOB: 2000   MRN:   [id number]       WHERE IS YOUR PAIN NOW? Maddi the areas on your body where you feel the described sensations. Use the appropriate symbol. Kalani Billie the areas of radiation. Include all affected areas. Just to complete the picture, please draw in the face. ACHE:  ^ ^ ^   NUMBNESS:  0000   PINS & NEEDLES:  = = = =                              ^ ^ ^                       0000              = = = =                                    ^ ^ ^                       0000            = = = =      BURNING:  XXXX   STABBING: ////                  XXXX                ////                         XXXX          ////     Please maddi the line below indicating your degree of pain right now  with 0 being no pain 10 being the worst pain possible.                                          0             1             2              3             4              5              6              7             8             9             10         Patient Signature: